# Patient Record
Sex: MALE | Race: BLACK OR AFRICAN AMERICAN | NOT HISPANIC OR LATINO | Employment: OTHER | URBAN - METROPOLITAN AREA
[De-identification: names, ages, dates, MRNs, and addresses within clinical notes are randomized per-mention and may not be internally consistent; named-entity substitution may affect disease eponyms.]

---

## 2024-11-25 ENCOUNTER — HOSPITAL ENCOUNTER (INPATIENT)
Facility: HOSPITAL | Age: 74
End: 2024-11-25
Attending: EMERGENCY MEDICINE | Admitting: INTERNAL MEDICINE
Payer: MEDICARE

## 2024-11-25 ENCOUNTER — CLINICAL SUPPORT (OUTPATIENT)
Dept: EMERGENCY MEDICINE | Facility: HOSPITAL | Age: 74
End: 2024-11-25
Payer: MEDICARE

## 2024-11-25 ENCOUNTER — APPOINTMENT (OUTPATIENT)
Dept: RADIOLOGY | Facility: HOSPITAL | Age: 74
End: 2024-11-25
Payer: MEDICARE

## 2024-11-25 DIAGNOSIS — M62.838 MUSCLE SPASM: ICD-10-CM

## 2024-11-25 DIAGNOSIS — L03.115 CELLULITIS OF RIGHT LOWER EXTREMITY: ICD-10-CM

## 2024-11-25 DIAGNOSIS — Z86.73 HISTORY OF CVA (CEREBROVASCULAR ACCIDENT): ICD-10-CM

## 2024-11-25 DIAGNOSIS — M86.9 OSTEOMYELITIS OF RIGHT FOOT, UNSPECIFIED TYPE (MULTI): Primary | ICD-10-CM

## 2024-11-25 DIAGNOSIS — I73.9 PERIPHERAL VASCULAR DISEASE, UNSPECIFIED (CMS-HCC): ICD-10-CM

## 2024-11-25 DIAGNOSIS — E11.69 TYPE 2 DIABETES MELLITUS WITH OTHER SPECIFIED COMPLICATION, UNSPECIFIED WHETHER LONG TERM INSULIN USE (MULTI): ICD-10-CM

## 2024-11-25 DIAGNOSIS — F41.9 ANXIETY: ICD-10-CM

## 2024-11-25 DIAGNOSIS — Z01.818 ENCOUNTER FOR OTHER PREPROCEDURAL EXAMINATION: ICD-10-CM

## 2024-11-25 DIAGNOSIS — R07.0 THROAT PAIN: ICD-10-CM

## 2024-11-25 LAB
ALBUMIN SERPL BCP-MCNC: 3.2 G/DL (ref 3.4–5)
ALP SERPL-CCNC: 74 U/L (ref 33–136)
ALT SERPL W P-5'-P-CCNC: 59 U/L (ref 10–52)
ANION GAP SERPL CALC-SCNC: 16 MMOL/L (ref 10–20)
APPEARANCE UR: CLEAR
APTT PPP: 33 SECONDS (ref 27–38)
AST SERPL W P-5'-P-CCNC: 37 U/L (ref 9–39)
ATRIAL RATE: 293 BPM
BASOPHILS # BLD AUTO: 0.03 X10*3/UL (ref 0–0.1)
BASOPHILS NFR BLD AUTO: 0.2 %
BILIRUB DIRECT SERPL-MCNC: 0.5 MG/DL (ref 0–0.3)
BILIRUB SERPL-MCNC: 1.3 MG/DL (ref 0–1.2)
BILIRUB UR STRIP.AUTO-MCNC: NEGATIVE MG/DL
BUN SERPL-MCNC: 17 MG/DL (ref 6–23)
CALCIUM SERPL-MCNC: 8.9 MG/DL (ref 8.6–10.6)
CHLORIDE SERPL-SCNC: 102 MMOL/L (ref 98–107)
CO2 SERPL-SCNC: 21 MMOL/L (ref 21–32)
COLOR UR: YELLOW
CREAT SERPL-MCNC: 0.8 MG/DL (ref 0.5–1.3)
CRP SERPL-MCNC: 16.25 MG/DL
EGFRCR SERPLBLD CKD-EPI 2021: >90 ML/MIN/1.73M*2
EOSINOPHIL # BLD AUTO: 0.06 X10*3/UL (ref 0–0.4)
EOSINOPHIL NFR BLD AUTO: 0.5 %
ERYTHROCYTE [DISTWIDTH] IN BLOOD BY AUTOMATED COUNT: 15.8 % (ref 11.5–14.5)
ERYTHROCYTE [SEDIMENTATION RATE] IN BLOOD BY WESTERGREN METHOD: 96 MM/H (ref 0–20)
FERRITIN SERPL-MCNC: 1087 NG/ML (ref 20–300)
GLUCOSE BLD MANUAL STRIP-MCNC: 224 MG/DL (ref 74–99)
GLUCOSE SERPL-MCNC: 210 MG/DL (ref 74–99)
GLUCOSE UR STRIP.AUTO-MCNC: NORMAL MG/DL
HCT VFR BLD AUTO: 27 % (ref 41–52)
HGB BLD-MCNC: 9 G/DL (ref 13.5–17.5)
HGB RETIC QN: 29 PG (ref 28–38)
HOLD SPECIMEN: NORMAL
HOLD SPECIMEN: NORMAL
IMM GRANULOCYTES # BLD AUTO: 0.08 X10*3/UL (ref 0–0.5)
IMM GRANULOCYTES NFR BLD AUTO: 0.6 % (ref 0–0.9)
IMMATURE RETIC FRACTION: 24.3 %
INR PPP: 2.1 (ref 0.9–1.1)
IRON SATN MFR SERPL: 13 % (ref 25–45)
IRON SERPL-MCNC: 25 UG/DL (ref 35–150)
KETONES UR STRIP.AUTO-MCNC: NEGATIVE MG/DL
LACTATE SERPL-SCNC: 1.2 MMOL/L (ref 0.4–2)
LACTATE SERPL-SCNC: 2.1 MMOL/L (ref 0.4–2)
LEUKOCYTE ESTERASE UR QL STRIP.AUTO: NEGATIVE
LYMPHOCYTES # BLD AUTO: 1.57 X10*3/UL (ref 0.8–3)
LYMPHOCYTES NFR BLD AUTO: 12.7 %
MCH RBC QN AUTO: 27.4 PG (ref 26–34)
MCHC RBC AUTO-ENTMCNC: 33.3 G/DL (ref 32–36)
MCV RBC AUTO: 82 FL (ref 80–100)
MONOCYTES # BLD AUTO: 1 X10*3/UL (ref 0.05–0.8)
MONOCYTES NFR BLD AUTO: 8.1 %
NEUTROPHILS # BLD AUTO: 9.65 X10*3/UL (ref 1.6–5.5)
NEUTROPHILS NFR BLD AUTO: 77.9 %
NITRITE UR QL STRIP.AUTO: NEGATIVE
NRBC BLD-RTO: 0 /100 WBCS (ref 0–0)
P AXIS: 84 DEGREES
P OFFSET: 169 MS
P ONSET: 130 MS
PH UR STRIP.AUTO: 7.5 [PH]
PHOSPHATE SERPL-MCNC: 2.5 MG/DL (ref 2.5–4.9)
PLATELET # BLD AUTO: 377 X10*3/UL (ref 150–450)
POTASSIUM SERPL-SCNC: 4.2 MMOL/L (ref 3.5–5.3)
PROT SERPL-MCNC: 7.5 G/DL (ref 6.4–8.2)
PROT UR STRIP.AUTO-MCNC: ABNORMAL MG/DL
PROTHROMBIN TIME: 24.2 SECONDS (ref 9.8–12.8)
Q ONSET: 219 MS
QRS COUNT: 14 BEATS
QRS DURATION: 78 MS
QT INTERVAL: 368 MS
QTC CALCULATION(BAZETT): 445 MS
QTC FREDERICIA: 418 MS
R AXIS: 89 DEGREES
RBC # BLD AUTO: 3.28 X10*6/UL (ref 4.5–5.9)
RBC # UR STRIP.AUTO: NEGATIVE /UL
RBC #/AREA URNS AUTO: ABNORMAL /HPF
RETICS #: 0.09 X10*6/UL (ref 0.02–0.11)
RETICS/RBC NFR AUTO: 2.7 % (ref 0.5–2)
SODIUM SERPL-SCNC: 135 MMOL/L (ref 136–145)
SP GR UR STRIP.AUTO: 1.02
T AXIS: 35 DEGREES
T OFFSET: 403 MS
TIBC SERPL-MCNC: 190 UG/DL (ref 240–445)
UIBC SERPL-MCNC: 165 UG/DL (ref 110–370)
UROBILINOGEN UR STRIP.AUTO-MCNC: ABNORMAL MG/DL
VENTRICULAR RATE: 88 BPM
WBC # BLD AUTO: 12.4 X10*3/UL (ref 4.4–11.3)
WBC #/AREA URNS AUTO: ABNORMAL /HPF

## 2024-11-25 PROCEDURE — 93010 ELECTROCARDIOGRAM REPORT: CPT | Performed by: EMERGENCY MEDICINE

## 2024-11-25 PROCEDURE — 81001 URINALYSIS AUTO W/SCOPE: CPT

## 2024-11-25 PROCEDURE — 2500000004 HC RX 250 GENERAL PHARMACY W/ HCPCS (ALT 636 FOR OP/ED)

## 2024-11-25 PROCEDURE — 82248 BILIRUBIN DIRECT: CPT

## 2024-11-25 PROCEDURE — 2500000001 HC RX 250 WO HCPCS SELF ADMINISTERED DRUGS (ALT 637 FOR MEDICARE OP)

## 2024-11-25 PROCEDURE — 2550000001 HC RX 255 CONTRASTS: Performed by: EMERGENCY MEDICINE

## 2024-11-25 PROCEDURE — 85045 AUTOMATED RETICULOCYTE COUNT: CPT

## 2024-11-25 PROCEDURE — 85730 THROMBOPLASTIN TIME PARTIAL: CPT

## 2024-11-25 PROCEDURE — 96367 TX/PROPH/DG ADDL SEQ IV INF: CPT

## 2024-11-25 PROCEDURE — 99291 CRITICAL CARE FIRST HOUR: CPT | Performed by: EMERGENCY MEDICINE

## 2024-11-25 PROCEDURE — 99285 EMERGENCY DEPT VISIT HI MDM: CPT | Mod: 25

## 2024-11-25 PROCEDURE — 83036 HEMOGLOBIN GLYCOSYLATED A1C: CPT

## 2024-11-25 PROCEDURE — 82947 ASSAY GLUCOSE BLOOD QUANT: CPT

## 2024-11-25 PROCEDURE — 73630 X-RAY EXAM OF FOOT: CPT | Mod: RIGHT SIDE | Performed by: RADIOLOGY

## 2024-11-25 PROCEDURE — 82728 ASSAY OF FERRITIN: CPT

## 2024-11-25 PROCEDURE — 73720 MRI LWR EXTREMITY W/O&W/DYE: CPT | Mod: RT

## 2024-11-25 PROCEDURE — 2500000002 HC RX 250 W HCPCS SELF ADMINISTERED DRUGS (ALT 637 FOR MEDICARE OP, ALT 636 FOR OP/ED)

## 2024-11-25 PROCEDURE — 93005 ELECTROCARDIOGRAM TRACING: CPT

## 2024-11-25 PROCEDURE — 99291 CRITICAL CARE FIRST HOUR: CPT | Mod: 25 | Performed by: EMERGENCY MEDICINE

## 2024-11-25 PROCEDURE — 83550 IRON BINDING TEST: CPT

## 2024-11-25 PROCEDURE — 80053 COMPREHEN METABOLIC PANEL: CPT

## 2024-11-25 PROCEDURE — A9575 INJ GADOTERATE MEGLUMI 0.1ML: HCPCS | Performed by: EMERGENCY MEDICINE

## 2024-11-25 PROCEDURE — 83605 ASSAY OF LACTIC ACID: CPT

## 2024-11-25 PROCEDURE — 73630 X-RAY EXAM OF FOOT: CPT | Mod: LT

## 2024-11-25 PROCEDURE — 84100 ASSAY OF PHOSPHORUS: CPT

## 2024-11-25 PROCEDURE — 96366 THER/PROPH/DIAG IV INF ADDON: CPT

## 2024-11-25 PROCEDURE — 73720 MRI LWR EXTREMITY W/O&W/DYE: CPT | Mod: RIGHT SIDE | Performed by: RADIOLOGY

## 2024-11-25 PROCEDURE — 96365 THER/PROPH/DIAG IV INF INIT: CPT

## 2024-11-25 PROCEDURE — 99223 1ST HOSP IP/OBS HIGH 75: CPT

## 2024-11-25 PROCEDURE — 85610 PROTHROMBIN TIME: CPT

## 2024-11-25 PROCEDURE — 73630 X-RAY EXAM OF FOOT: CPT | Mod: RT

## 2024-11-25 PROCEDURE — 87040 BLOOD CULTURE FOR BACTERIA: CPT

## 2024-11-25 PROCEDURE — 36415 COLL VENOUS BLD VENIPUNCTURE: CPT

## 2024-11-25 PROCEDURE — 85652 RBC SED RATE AUTOMATED: CPT

## 2024-11-25 PROCEDURE — 1210000001 HC SEMI-PRIVATE ROOM DAILY

## 2024-11-25 PROCEDURE — 86140 C-REACTIVE PROTEIN: CPT

## 2024-11-25 PROCEDURE — 85025 COMPLETE CBC W/AUTO DIFF WBC: CPT

## 2024-11-25 RX ORDER — POLYETHYLENE GLYCOL 3350 17 G/17G
17 POWDER, FOR SOLUTION ORAL DAILY PRN
Status: DISCONTINUED | OUTPATIENT
Start: 2024-11-25 | End: 2024-11-26

## 2024-11-25 RX ORDER — GABAPENTIN 300 MG/1
300 CAPSULE ORAL 2 TIMES DAILY
Status: DISCONTINUED | OUTPATIENT
Start: 2024-11-25 | End: 2024-11-25

## 2024-11-25 RX ORDER — VANCOMYCIN HYDROCHLORIDE 1 G/200ML
1000 INJECTION, SOLUTION INTRAVENOUS EVERY 12 HOURS
Status: DISCONTINUED | OUTPATIENT
Start: 2024-11-26 | End: 2024-11-29

## 2024-11-25 RX ORDER — GABAPENTIN 300 MG/1
300 CAPSULE ORAL 3 TIMES DAILY
Status: DISCONTINUED | OUTPATIENT
Start: 2024-11-25 | End: 2024-12-07 | Stop reason: HOSPADM

## 2024-11-25 RX ORDER — ACETAMINOPHEN 325 MG/1
975 TABLET ORAL EVERY 8 HOURS PRN
Status: DISCONTINUED | OUTPATIENT
Start: 2024-11-25 | End: 2024-11-26

## 2024-11-25 RX ORDER — CYANOCOBALAMIN (VITAMIN B-12) 500 MCG
400 TABLET ORAL DAILY
Status: DISCONTINUED | OUTPATIENT
Start: 2024-11-26 | End: 2024-12-07 | Stop reason: HOSPADM

## 2024-11-25 RX ORDER — ALUMINUM HYDROXIDE, MAGNESIUM HYDROXIDE, AND SIMETHICONE 1200; 120; 1200 MG/30ML; MG/30ML; MG/30ML
30 SUSPENSION ORAL EVERY 6 HOURS PRN
Status: DISCONTINUED | OUTPATIENT
Start: 2024-11-25 | End: 2024-12-07 | Stop reason: HOSPADM

## 2024-11-25 RX ORDER — VANCOMYCIN HYDROCHLORIDE 1 G/20ML
INJECTION, POWDER, LYOPHILIZED, FOR SOLUTION INTRAVENOUS DAILY PRN
Status: DISCONTINUED | OUTPATIENT
Start: 2024-11-25 | End: 2024-11-30

## 2024-11-25 RX ORDER — ACETAMINOPHEN 325 MG/1
650 TABLET ORAL ONCE
Status: COMPLETED | OUTPATIENT
Start: 2024-11-25 | End: 2024-11-25

## 2024-11-25 RX ORDER — TAMSULOSIN HYDROCHLORIDE 0.4 MG/1
0.4 CAPSULE ORAL NIGHTLY
Status: DISCONTINUED | OUTPATIENT
Start: 2024-11-25 | End: 2024-12-07 | Stop reason: HOSPADM

## 2024-11-25 RX ORDER — OXYCODONE HYDROCHLORIDE 5 MG/1
5 TABLET ORAL ONCE
Status: COMPLETED | OUTPATIENT
Start: 2024-11-25 | End: 2024-11-25

## 2024-11-25 RX ORDER — B-COMPLEX WITH VITAMIN C
1 TABLET ORAL DAILY
Status: DISCONTINUED | OUTPATIENT
Start: 2024-11-26 | End: 2024-12-07 | Stop reason: HOSPADM

## 2024-11-25 RX ORDER — GADOTERATE MEGLUMINE 376.9 MG/ML
18 INJECTION INTRAVENOUS
Status: COMPLETED | OUTPATIENT
Start: 2024-11-25 | End: 2024-11-25

## 2024-11-25 RX ORDER — VANCOMYCIN HYDROCHLORIDE 1 G/200ML
2 INJECTION, SOLUTION INTRAVENOUS ONCE
Status: COMPLETED | OUTPATIENT
Start: 2024-11-25 | End: 2024-11-25

## 2024-11-25 ASSESSMENT — ENCOUNTER SYMPTOMS
RESPIRATORY NEGATIVE: 1
ALLERGIC/IMMUNOLOGIC NEGATIVE: 1
NECK PAIN: 1
EYES NEGATIVE: 1
GASTROINTESTINAL NEGATIVE: 1
FATIGUE: 1
CHILLS: 1
CARDIOVASCULAR NEGATIVE: 1
WOUND: 1
NEUROLOGICAL NEGATIVE: 1
HEMATOLOGIC/LYMPHATIC NEGATIVE: 1
PSYCHIATRIC NEGATIVE: 1
RHINORRHEA: 1

## 2024-11-25 ASSESSMENT — COLUMBIA-SUICIDE SEVERITY RATING SCALE - C-SSRS
2. HAVE YOU ACTUALLY HAD ANY THOUGHTS OF KILLING YOURSELF?: NO
1. IN THE PAST MONTH, HAVE YOU WISHED YOU WERE DEAD OR WISHED YOU COULD GO TO SLEEP AND NOT WAKE UP?: NO
6. HAVE YOU EVER DONE ANYTHING, STARTED TO DO ANYTHING, OR PREPARED TO DO ANYTHING TO END YOUR LIFE?: NO

## 2024-11-25 ASSESSMENT — PAIN - FUNCTIONAL ASSESSMENT
PAIN_FUNCTIONAL_ASSESSMENT: 0-10
PAIN_FUNCTIONAL_ASSESSMENT: 0-10

## 2024-11-25 ASSESSMENT — PAIN SCALES - GENERAL
PAINLEVEL_OUTOF10: 8
PAINLEVEL_OUTOF10: 2

## 2024-11-25 ASSESSMENT — PAIN DESCRIPTION - DESCRIPTORS: DESCRIPTORS: SHOOTING;THROBBING

## 2024-11-25 ASSESSMENT — ACTIVITIES OF DAILY LIVING (ADL): LACK_OF_TRANSPORTATION: NO

## 2024-11-25 ASSESSMENT — PAIN DESCRIPTION - PAIN TYPE: TYPE: ACUTE PAIN

## 2024-11-25 ASSESSMENT — PAIN DESCRIPTION - LOCATION: LOCATION: FOOT

## 2024-11-25 ASSESSMENT — PAIN DESCRIPTION - PROGRESSION: CLINICAL_PROGRESSION: GRADUALLY IMPROVING

## 2024-11-25 ASSESSMENT — PAIN DESCRIPTION - ORIENTATION: ORIENTATION: RIGHT

## 2024-11-25 NOTE — SIGNIFICANT EVENT
We went to evaluate the patient with attending Dr. Rodriguez; however, the patient was not available in the room as he had gone for an MRI.  We reviewed the available vitals, labs, imaging, and notes.  The plan is to fully evaluate the patient tomorrow morning.  Of note, we spoke with Dr. Posadas's CNP at the [facility], who recommended a BKA for the patient.

## 2024-11-25 NOTE — ED PROVIDER NOTES
History of Present Illness     History provided by: Patient  Limitations to History: None  External Records Reviewed with Brief Summary: Discharge Summary from 7/22/24 which showed patient was admitted for a repeat incision and drainage of his right foot due to worsening of his chronic osteomyelitis. He was initially supposed to be nonweightbearing, but was unable to comply. He had additionally had a CT that noted soft tissue extending to the bone concern for acute osteomyelitis. Infectious disease was consulted and patient was given a PICC line in order to receive six weeks of IV vancomycin. He was also taken to the OR on 7/18/24 four left foot second digit proximal interphalangeal joint arthroplasty with debridement.    HPI:  Dustin Ace is a 74 y.o. male presenting with right foot wound and concern for infection. Patient states that he has had a chronic ulcer on the bottom of his right foot. He states that at one point the wound had seem to be healing, but then continued to get worse and he noted pus draining from the wound prompting him to come to the ED today. He has been doing wound care at home but states he has had difficulty maintaining the dressings and is not sure if he is doing it correctly. He ambulates at home with a walker and a walking boot. He also notes that he is felt feverish, chills for about a week. When discussing with patient his previous care at an outside hospital, he notes that he was concerned that he has not been able to adequately care for his wounds at home. He came here today for further assessment as he is concerned that the infection is getting worse. He states that he needs more assistance with wound care than he had previously gotten at outside hospital.    PMH: emphysema, BPH, CVA hx, PAD, Afib (on Eliquis), HTN, DM, chronic osteomyelitis    Physical Exam   Triage vitals:  T (!) 38 °C (100.4 °F)  HR (!) 101  /71  RR 18  O2 98 % None (Room air)    General: Awake, alert,  in no acute distress  Eyes: Gaze conjugate. EOMI. No scleral icterus or injection.   HENT: Normo-cephalic, atraumatic. No stridor.   CV: Regular rate, regular rhythm. L DP pulse 2+, R DP pulse dopplerable.   Resp: Breathing non-labored, speaking in full sentences.  Clear to auscultation bilaterally. No wheezing, rales, rhonchi.   GI: Soft, non-distended, non-tender. No rebound or guarding.   MSK/Extremities:   R foot:   R Lateral ankle:   L foot:   Skin: Warm. Appropriate color.   Neuro: Alert and oriented x3. Face symmetric. Speech is fluent.  Gross strength and sensation intact in b/l UE and Les.  Psych: Appropriate mood and affect      Medical Decision Making & ED Course   Medical Decision Makin y.o. male on physical exam, patient is noted to be febrile and tachycardic. His left foot has a ulcer on the dorsal aspect of his foot and amputation of the left first toe was noted. This wound appears dry, no purulent drainage. The right foot has a ulcer extending across the entire width of the endorsing of the foot. The ulcer extends deep to the foot with visible tissue, purulent drainage and surrounding skin surrounding the ulcer has green color. Concern for acute on chronic osteomyelitis and cellulitis possibly leading to sepsis. Patient is SIRS positive. Will start on fluids and empiric antibiotics. Will obtain labs and consult podiatry for further recommendations. Patient is currently denying need for pain medication. Also obtain x-rays to assess initial degree of known chronic osteomyelitis.  Discussed imaging with podiatry, and they recommended obtaining an MRI and will see the patient.      ED Course:  ED Course as of 24 2332   Mon 2024   1323 XR foot right 3+ views  IMPRESSION:  Findings highly concerning for osteomyelitis involving the right  midfoot at its plantar aspect. Associated severe neuropathic arthropathy present.  [AC]   1323 XR foot left 3+ views  IMPRESSION:  Postsurgical changes  in the left without  evidence of osteomyelitis radiographically   [AC]   1347 Lactate(!): 2.1 [AC]   1352 CBC and Auto Differential(!)  Patient's most recent hemoglobin in 8/2024 was 13.1. Today patients hemoglobin is 9.   WBC is 12.4, concerning for infectious vs inflammatory response. [AC]      ED Course User Index  [AC] Reid Luke DO         Diagnoses as of 11/26/24 2332   Osteomyelitis of right foot, unspecified type (Multi)   Cellulitis of right lower extremity       EKG Independent Interpretation:  EKG obtained at 11:36 independently interpreted by me. It demonstrates rate of 88. Irregularly irregular rhythm. No axis deviation. QRS 78, . No ST elevations noted. T-wave inversion in AVR. No previous EKG to compare. Patient does have a history of atrial fibrillation, anticoagulated on Eliquis.    Chronic conditions affecting the patient's care: As documented above in MDM      Care Considerations: As documented above in MDM      Disposition   Patient was signed out to Dr. Robledo at 15:00 pending completion of their work-up.  Please see the next provider's transition of care note for the remainder of the patient's care.     Procedures   Procedures        Reid Luke DO  Emergency Medicine PGY1     Reid Luke DO  Resident  11/26/24 8052       Reid Luke DO  Resident  11/27/24 [id number]

## 2024-11-25 NOTE — PROGRESS NOTES
Emergency Department Transition of Care Note       Signout   I received Dustin Ace in signout from Dr. Marly DO.  Please see the ED Provider Note for all HPI, PE and MDM up to the time of signout at 1500.  This is in addition to the primary record.    In brief Dustin Ace is an 74 y.o. male presenting for assessment of chronic wounds to his right foot which he feels hs is unable to care for on his own at home. Patient has been seen at [facility] regarding his chronic osteomyelitis for virtually all of his care in the past, but became frustrated with the lack of provided wound care as of recently. Patient evaluated and was noted to meet SIRS criteria with a leukocytosis, X-rays concerning for possible osteomyelitis. Patient received Vanc/Zosyn and MRI ordered for further evaluation. Podiatry was consulted and will come down to evaluate the patient while in the ED.     At the time of signout we were awaiting:  MRI right foot   Final disposition     ED Course & Medical Decision Making   Medical Decision Making:  Under my care, I evaluted the pateient at bedside.  Patient denied any need for pain control at this time.    Patient went to MRI and tolerated this without difficulty.  MRI was read as concern for stimulators of the right lower extremity as well as cellulitis.  I did speak with the admission coordinator who accepted the patient for admission.  Patient updated on the plan for admission and was agreeable.    ED Course:  ED Course as of 11/25/24 1954 Mon Nov 25, 2024   1323 XR foot right 3+ views  IMPRESSION:  Findings highly concerning for osteomyelitis involving the right  midfoot at its plantar aspect. Associated severe neuropathic arthropathy present.  [AC]   1323 XR foot left 3+ views  IMPRESSION:  Postsurgical changes in the left without  evidence of osteomyelitis radiographically   [AC]   1347 Lactate(!): 2.1 [AC]   1352 CBC and Auto Differential(!)  Patient's most recent hemoglobin in  8/2024 was 13.1. Today patients hemoglobin is 9.   WBC is 12.4, concerning for infectious vs inflammatory response. [AC]      ED Course User Index  [AC] Reid Luke DO         Diagnoses as of 11/25/24 1954   Osteomyelitis of right foot, unspecified type (Multi)   Cellulitis of right lower extremity       Disposition   As a result of their workup, the patient will require admission to the hospital.  The patient was informed of his diagnosis.  The patient was given the opportunity to ask questions and I answered them. The patient agreed to be admitted to the hospital.    Procedures   Procedures    Patient seen and discussed with ED attending physician.    Mitali Bojorquez DO  Emergency Medicine

## 2024-11-25 NOTE — ED TRIAGE NOTES
Right foot pain shooting 8/10. Patient has diabetic wound on bottom of right foot. Reports 2 weeks ago wound has reopened after being healed. Wound encompassing bottom of the foot. Patient reports pus draining from wound with redness. Patient having difficulty and pain with ambulating back to triage. Patient has neuropathy to bilateral feet. Patient complaining of feeling very weak and fatigued. Patient is A&Ox4.

## 2024-11-25 NOTE — PROGRESS NOTES
Dustin Ace is a 74 y.o. male on day 0 of admission.       11/25/24 3788   Discharge Planning   Living Arrangements Alone   Support Systems Children   Assistance Needed walker, cane, boot   Type of Residence Private residence   Number of Stairs to Enter Residence   (3rd floor, uses elevator)   Number of Stairs Within Residence 0   Do you have animals or pets at home? No   Does the patient need discharge transport arranged? Yes   RoundTrip coordination needed? Yes   Has discharge transport been arranged? No   Financial Resource Strain   How hard is it for you to pay for the very basics like food, housing, medical care, and heating? Not very   Housing Stability   In the last 12 months, was there a time when you were not able to pay the mortgage or rent on time? N   At any time in the past 12 months, were you homeless or living in a shelter (including now)? N   Transportation Needs   In the past 12 months, has lack of transportation kept you from medical appointments or from getting medications? yes   In the past 12 months, has lack of transportation kept you from meetings, work, or from getting things needed for daily living? No   Stroke Family Assessment   Stroke Family Assessment Needed No   Intensity of Service   Intensity of Service 0-30 min     TCC met with patient. Patient states he lives alone. His children help him but they can't always help due to work. Patient uses a walker and cane, there is a boot at the bedside, has a shower chair at home. Patient states he has a home health aide through [facility], but they don't always show up. He states he also needs an aide with a car as he sometimes needs his medications picked up. He states when the aide cannot get them, he is usually able to call someone else to pick them up. The aide usually helps him with ADLs and tidying the home. He states he can do this by himself too. Patient states he previously had home health services with [facility]. PT/OT and RN  visits. The nurse was handling his wounds. He states the wounds got better and he was told by his care team they were healed enough to manage on his own. He was told to use an antibiotic soap on them. Patient had trouble caring for the wounds as he cannot see them and he doesn't understand fully what he is supposed to do, so the ulcers got worse without proper care.   Patient states he is open to having home services again.     TIFFANIE/NOMI to follow for discharge planning pending MD workup and dispo.

## 2024-11-26 ENCOUNTER — APPOINTMENT (OUTPATIENT)
Dept: VASCULAR MEDICINE | Facility: HOSPITAL | Age: 74
End: 2024-11-26
Payer: MEDICARE

## 2024-11-26 LAB
ALBUMIN SERPL BCP-MCNC: 2.7 G/DL (ref 3.4–5)
ANION GAP SERPL CALC-SCNC: 12 MMOL/L (ref 10–20)
APTT PPP: 33 SECONDS (ref 27–38)
BASOPHILS # BLD AUTO: 0.05 X10*3/UL (ref 0–0.1)
BASOPHILS NFR BLD AUTO: 0.6 %
BUN SERPL-MCNC: 10 MG/DL (ref 6–23)
CALCIUM SERPL-MCNC: 7.8 MG/DL (ref 8.6–10.6)
CHLORIDE SERPL-SCNC: 102 MMOL/L (ref 98–107)
CHOLEST SERPL-MCNC: 62 MG/DL (ref 0–199)
CHOLESTEROL/HDL RATIO: 4.5
CO2 SERPL-SCNC: 24 MMOL/L (ref 21–32)
CREAT SERPL-MCNC: 0.67 MG/DL (ref 0.5–1.3)
EGFRCR SERPLBLD CKD-EPI 2021: >90 ML/MIN/1.73M*2
EOSINOPHIL # BLD AUTO: 0.17 X10*3/UL (ref 0–0.4)
EOSINOPHIL NFR BLD AUTO: 2 %
ERYTHROCYTE [DISTWIDTH] IN BLOOD BY AUTOMATED COUNT: 15.4 % (ref 11.5–14.5)
EST. AVERAGE GLUCOSE BLD GHB EST-MCNC: 114 MG/DL
GLUCOSE BLD MANUAL STRIP-MCNC: 89 MG/DL (ref 74–99)
GLUCOSE BLD MANUAL STRIP-MCNC: 95 MG/DL (ref 74–99)
GLUCOSE SERPL-MCNC: 106 MG/DL (ref 74–99)
HBA1C MFR BLD: 5.6 %
HCT VFR BLD AUTO: 23.3 % (ref 41–52)
HDLC SERPL-MCNC: 13.9 MG/DL
HGB BLD-MCNC: 8 G/DL (ref 13.5–17.5)
HOLD SPECIMEN: NORMAL
IMM GRANULOCYTES # BLD AUTO: 0.12 X10*3/UL (ref 0–0.5)
IMM GRANULOCYTES NFR BLD AUTO: 1.4 % (ref 0–0.9)
LDLC SERPL CALC-MCNC: 34 MG/DL
LYMPHOCYTES # BLD AUTO: 2.22 X10*3/UL (ref 0.8–3)
LYMPHOCYTES NFR BLD AUTO: 25.8 %
MAGNESIUM SERPL-MCNC: 1.92 MG/DL (ref 1.6–2.4)
MCH RBC QN AUTO: 27.6 PG (ref 26–34)
MCHC RBC AUTO-ENTMCNC: 34.3 G/DL (ref 32–36)
MCV RBC AUTO: 80 FL (ref 80–100)
MONOCYTES # BLD AUTO: 0.79 X10*3/UL (ref 0.05–0.8)
MONOCYTES NFR BLD AUTO: 9.2 %
NEUTROPHILS # BLD AUTO: 5.24 X10*3/UL (ref 1.6–5.5)
NEUTROPHILS NFR BLD AUTO: 61 %
NON HDL CHOLESTEROL: 48 MG/DL (ref 0–149)
NRBC BLD-RTO: 0 /100 WBCS (ref 0–0)
PHOSPHATE SERPL-MCNC: 3.2 MG/DL (ref 2.5–4.9)
PLATELET # BLD AUTO: 341 X10*3/UL (ref 150–450)
POTASSIUM SERPL-SCNC: 3.8 MMOL/L (ref 3.5–5.3)
RBC # BLD AUTO: 2.9 X10*6/UL (ref 4.5–5.9)
SODIUM SERPL-SCNC: 134 MMOL/L (ref 136–145)
TRIGL SERPL-MCNC: 69 MG/DL (ref 0–149)
VLDL: 14 MG/DL (ref 0–40)
WBC # BLD AUTO: 8.6 X10*3/UL (ref 4.4–11.3)

## 2024-11-26 PROCEDURE — 93925 LOWER EXTREMITY STUDY: CPT

## 2024-11-26 PROCEDURE — 99233 SBSQ HOSP IP/OBS HIGH 50: CPT

## 2024-11-26 PROCEDURE — 2500000002 HC RX 250 W HCPCS SELF ADMINISTERED DRUGS (ALT 637 FOR MEDICARE OP, ALT 636 FOR OP/ED)

## 2024-11-26 PROCEDURE — 99221 1ST HOSP IP/OBS SF/LOW 40: CPT | Performed by: STUDENT IN AN ORGANIZED HEALTH CARE EDUCATION/TRAINING PROGRAM

## 2024-11-26 PROCEDURE — 2500000004 HC RX 250 GENERAL PHARMACY W/ HCPCS (ALT 636 FOR OP/ED)

## 2024-11-26 PROCEDURE — 93922 UPR/L XTREMITY ART 2 LEVELS: CPT | Performed by: SURGERY

## 2024-11-26 PROCEDURE — 82947 ASSAY GLUCOSE BLOOD QUANT: CPT

## 2024-11-26 PROCEDURE — 2500000001 HC RX 250 WO HCPCS SELF ADMINISTERED DRUGS (ALT 637 FOR MEDICARE OP)

## 2024-11-26 PROCEDURE — 80069 RENAL FUNCTION PANEL: CPT

## 2024-11-26 PROCEDURE — 93922 UPR/L XTREMITY ART 2 LEVELS: CPT

## 2024-11-26 PROCEDURE — 1210000001 HC SEMI-PRIVATE ROOM DAILY

## 2024-11-26 PROCEDURE — 85025 COMPLETE CBC W/AUTO DIFF WBC: CPT

## 2024-11-26 PROCEDURE — 93925 LOWER EXTREMITY STUDY: CPT | Performed by: SURGERY

## 2024-11-26 PROCEDURE — 80061 LIPID PANEL: CPT

## 2024-11-26 PROCEDURE — 36415 COLL VENOUS BLD VENIPUNCTURE: CPT

## 2024-11-26 PROCEDURE — 85730 THROMBOPLASTIN TIME PARTIAL: CPT

## 2024-11-26 PROCEDURE — 83735 ASSAY OF MAGNESIUM: CPT

## 2024-11-26 PROCEDURE — 87077 CULTURE AEROBIC IDENTIFY: CPT

## 2024-11-26 RX ORDER — ACETAMINOPHEN 325 MG/1
975 TABLET ORAL EVERY 8 HOURS
Status: DISCONTINUED | OUTPATIENT
Start: 2024-11-26 | End: 2024-12-07 | Stop reason: HOSPADM

## 2024-11-26 RX ORDER — POLYETHYLENE GLYCOL 3350 17 G/17G
17 POWDER, FOR SOLUTION ORAL DAILY
Status: DISCONTINUED | OUTPATIENT
Start: 2024-11-26 | End: 2024-12-07 | Stop reason: HOSPADM

## 2024-11-26 RX ORDER — OXYCODONE HYDROCHLORIDE 5 MG/1
5 TABLET ORAL EVERY 4 HOURS PRN
Status: DISCONTINUED | OUTPATIENT
Start: 2024-11-26 | End: 2024-12-07 | Stop reason: HOSPADM

## 2024-11-26 RX ORDER — ATORVASTATIN CALCIUM 80 MG/1
80 TABLET, FILM COATED ORAL NIGHTLY
Status: DISCONTINUED | OUTPATIENT
Start: 2024-11-26 | End: 2024-12-07 | Stop reason: HOSPADM

## 2024-11-26 RX ORDER — MAGNESIUM SULFATE HEPTAHYDRATE 40 MG/ML
2 INJECTION, SOLUTION INTRAVENOUS ONCE
Status: COMPLETED | OUTPATIENT
Start: 2024-11-26 | End: 2024-11-26

## 2024-11-26 RX ORDER — HEPARIN SODIUM 10000 [USP'U]/100ML
0-4000 INJECTION, SOLUTION INTRAVENOUS CONTINUOUS
Status: DISPENSED | OUTPATIENT
Start: 2024-11-26 | End: 2024-11-29

## 2024-11-26 RX ORDER — MAGNESIUM SULFATE HEPTAHYDRATE 40 MG/ML
INJECTION, SOLUTION INTRAVENOUS
Status: COMPLETED
Start: 2024-11-26 | End: 2024-11-26

## 2024-11-26 ASSESSMENT — PAIN SCALES - GENERAL
PAINLEVEL_OUTOF10: 0 - NO PAIN
PAINLEVEL_OUTOF10: 0 - NO PAIN

## 2024-11-26 ASSESSMENT — PAIN - FUNCTIONAL ASSESSMENT: PAIN_FUNCTIONAL_ASSESSMENT: 0-10

## 2024-11-26 NOTE — H&P (VIEW-ONLY)
PODIATRY CONSULT NOTE    SERVICE DATE: 11/26/2024   SERVICE TIME:  08:43 AM    REASON FOR CONSULT: Chronic R foot osteo, with worsening of wound   REQUESTING PHYSICIAN: Reid Luke DO   PRIMARY CARE PHYSICIAN: No Assigned PCP Generic Provider, MD    Subjective   HPI:  Mr. Ace is a 74 y.o. male with past medical history of emphysema, BPH, chronic hepatitis C (treated), CVA, PAD, Afib, HTN, DM, Charcot deformity, and chronic right foot osteomyelitis presenting a with right foot wound. The patient reports that his issue began over two years ago, with recurrent hospitals visits for wound management. His left great toe was amputated in July. On 11/11/24, he noticed a sore on the right side of his foot, which was treated, and he was discharged with wound care instruction. He presented to the ED yesterday due to an increase in pus and drainage from the wound, which started approximately one week ago. He reports limited mobility primarily walking short distances within the home, and uses cane for ambulation. Patient also reports fevers and chills over the past week but denies headache, nausea, vomiting, chest pain and SOB.     Podiatry was consulted for chronic R foot osteomyelitis, and worsening of wound.    ROS: 10-point review of systems was performed and is otherwise negative except as noted in HPI.  PMH: Reviewed/documented below.  PSH:  Noncontributory except per HPI   FH: Reviewed and noncontributory   SOCIAL:  Reviewed/documented below.  ALLERGIES: Reviewed/documented below.  MEDS: Reviewed/documented below.  VS: Reviewed/documented below.    No past medical history on file.  No past surgical history on file.  No family history on file.      (Not in a hospital admission)       Medications:  Scheduled Meds: acetaminophen, 975 mg, oral, q8h  atorvastatin, 80 mg, oral, Nightly  B complex-vitamin C, 1 tablet, oral, Daily  cholecalciferol, 400 Units, oral, Daily  gabapentin, 300 mg, oral,  TID  piperacillin-tazobactam, 4.5 g, intravenous, q6h  polyethylene glycol, 17 g, oral, Daily  tamsulosin, 0.4 mg, oral, Nightly  vancomycin, 1,000 mg, intravenous, q12h      Continuous Infusions:    PRN Meds: PRN medications: alum-mag hydroxide-simeth, oxyCODONE, vancomycin    Allergies as of 11/25/2024    (No Known Allergies)            Objective   PHYSICAL EXAM:  Physical Exam Performed:  Vitals:    11/26/24 0800   BP: 100/62   Pulse: 69   Resp: 16   Temp:    SpO2: 96%     Body mass index is 25 kg/m².    Patient is AOx3 and in no acute distress. Patient is alert and cooperative.       Vascular: Non-palpable DP/PT pulses B/L. Mild pitting edema noted B/L. Hair growth absent B/L. CFT<5 to B/L hallux. Temperature is warm to cool from tibial tuberosity to distal digits B/L.     Musculoskeletal: Gross active and passive ROM intact to age and activity level. Moves all extremities spontaneously. Charcot deformity noted to right foot. Left hallux amputation noted.     Neurological: Absent light touch sensation B/L. Pain stimuli absent B/L.     Dermatologic: Nails 1-5 are within normal limits for thickness and length B/L. Skin appears diffusely xerotic B/L. Web spaces 1-4 B/L are clean, dry and intact.     Wound: right plantar foot       Mixed wound base of fibro-granular tissue.   Mild serosanguinous drainage   Minimal jona-wound maceration.   Mild jona-wound erythema.   No evidence of ascending cellulitis or lymphangitis.  No palpable fluctuance. No malodor. No increased warmth.   Significant probe to bone or deep structures within the wound base.   Minimal undermining.     Wound: right lateral leg       Wound base mostly granular  No serosanguinous drainage with fibrotic slough.   No jona-wound maceration.   Minimal jona-wound erythema.   No evidence of ascending cellulitis or lymphangitis.  No palpable fluctuance. No malodor. No increased warmth.    No tunneling or tracking.   No undermining. Skin edges irregular but  intact.    Wound: Left foot      Wound base fibrotic tissue.   No serosanguinous drainage with fibrotic slough.   No jona-wound maceration.   No jona-wound erythema.   No evidence of ascending cellulitis or lymphangitis.  No palpable fluctuance. No malodor. No increased warmth.   No tunneling or tracking.   No undermining.     LABS:   Results for orders placed or performed during the hospital encounter of 11/25/24 (from the past 24 hours)   POCT GLUCOSE   Result Value Ref Range    POCT Glucose 224 (H) 74 - 99 mg/dL   ECG 12 lead   Result Value Ref Range    Ventricular Rate 88 BPM    Atrial Rate 293 BPM    QRS Duration 78 ms    QT Interval 368 ms    QTC Calculation(Bazett) 445 ms    P Axis 84 degrees    R Axis 89 degrees    T Axis 35 degrees    QRS Count 14 beats    Q Onset 219 ms    P Onset 130 ms    P Offset 169 ms    T Offset 403 ms    QTC Fredericia 418 ms   CBC and Auto Differential   Result Value Ref Range    WBC 12.4 (H) 4.4 - 11.3 x10*3/uL    nRBC 0.0 0.0 - 0.0 /100 WBCs    RBC 3.28 (L) 4.50 - 5.90 x10*6/uL    Hemoglobin 9.0 (L) 13.5 - 17.5 g/dL    Hematocrit 27.0 (L) 41.0 - 52.0 %    MCV 82 80 - 100 fL    MCH 27.4 26.0 - 34.0 pg    MCHC 33.3 32.0 - 36.0 g/dL    RDW 15.8 (H) 11.5 - 14.5 %    Platelets 377 150 - 450 x10*3/uL    Neutrophils % 77.9 40.0 - 80.0 %    Immature Granulocytes %, Automated 0.6 0.0 - 0.9 %    Lymphocytes % 12.7 13.0 - 44.0 %    Monocytes % 8.1 2.0 - 10.0 %    Eosinophils % 0.5 0.0 - 6.0 %    Basophils % 0.2 0.0 - 2.0 %    Neutrophils Absolute 9.65 (H) 1.60 - 5.50 x10*3/uL    Immature Granulocytes Absolute, Automated 0.08 0.00 - 0.50 x10*3/uL    Lymphocytes Absolute 1.57 0.80 - 3.00 x10*3/uL    Monocytes Absolute 1.00 (H) 0.05 - 0.80 x10*3/uL    Eosinophils Absolute 0.06 0.00 - 0.40 x10*3/uL    Basophils Absolute 0.03 0.00 - 0.10 x10*3/uL   Comprehensive metabolic panel   Result Value Ref Range    Glucose 210 (H) 74 - 99 mg/dL    Sodium 135 (L) 136 - 145 mmol/L    Potassium 4.2 3.5 - 5.3  mmol/L    Chloride 102 98 - 107 mmol/L    Bicarbonate 21 21 - 32 mmol/L    Anion Gap 16 10 - 20 mmol/L    Urea Nitrogen 17 6 - 23 mg/dL    Creatinine 0.80 0.50 - 1.30 mg/dL    eGFR >90 >60 mL/min/1.73m*2    Calcium 8.9 8.6 - 10.6 mg/dL    Albumin 3.2 (L) 3.4 - 5.0 g/dL    Alkaline Phosphatase 74 33 - 136 U/L    Total Protein 7.5 6.4 - 8.2 g/dL    AST 37 9 - 39 U/L    Bilirubin, Total 1.3 (H) 0.0 - 1.2 mg/dL    ALT 59 (H) 10 - 52 U/L   Lactate   Result Value Ref Range    Lactate 2.1 (H) 0.4 - 2.0 mmol/L   Protime-INR   Result Value Ref Range    Protime 24.2 (H) 9.8 - 12.8 seconds    INR 2.1 (H) 0.9 - 1.1   aPTT   Result Value Ref Range    aPTT 33 27 - 38 seconds   C-reactive protein   Result Value Ref Range    C-Reactive Protein 16.25 (H) <1.00 mg/dL   Bilirubin, Direct   Result Value Ref Range    Bilirubin, Direct 0.5 (H) 0.0 - 0.3 mg/dL   Iron and TIBC   Result Value Ref Range    Iron 25 (L) 35 - 150 ug/dL    UIBC 165 110 - 370 ug/dL    TIBC 190 (L) 240 - 445 ug/dL    % Saturation 13 (L) 25 - 45 %   Ferritin   Result Value Ref Range    Ferritin 1,087 (H) 20 - 300 ng/mL   Phosphorus   Result Value Ref Range    Phosphorus 2.5 2.5 - 4.9 mg/dL   Hemoglobin A1c   Result Value Ref Range    Hemoglobin A1C 5.6 See comment %    Estimated Average Glucose 114 Not Established mg/dL   Blood Culture    Specimen: Peripheral Venipuncture; Blood culture   Result Value Ref Range    Blood Culture Loaded on Instrument - Culture in progress    Lavender Top   Result Value Ref Range    Extra Tube Hold for add-ons.    PST Top   Result Value Ref Range    Extra Tube Hold for add-ons.    Sedimentation rate, automated   Result Value Ref Range    Sedimentation Rate 96 (H) 0 - 20 mm/h   Reticulocytes   Result Value Ref Range    Retic % 2.7 (H) 0.5 - 2.0 %    Retic Absolute 0.086 0.017 - 0.110 x10*6/uL    Reticulocyte Hemoglobin 29 28 - 38 pg    Immature Retic fraction 24.3 (H) <=16.0 %   Blood Culture    Specimen: Peripheral Venipuncture; Blood  culture   Result Value Ref Range    Blood Culture Loaded on Instrument - Culture in progress    Lactate   Result Value Ref Range    Lactate 1.2 0.4 - 2.0 mmol/L   Urinalysis with Reflex Culture and Microscopic   Result Value Ref Range    Color, Urine Yellow Light-Yellow, Yellow, Dark-Yellow    Appearance, Urine Clear Clear    Specific Gravity, Urine 1.022 1.005 - 1.035    pH, Urine 7.5 5.0, 5.5, 6.0, 6.5, 7.0, 7.5, 8.0    Protein, Urine 10 (TRACE) NEGATIVE, 10 (TRACE), 20 (TRACE) mg/dL    Glucose, Urine Normal Normal mg/dL    Blood, Urine NEGATIVE NEGATIVE    Ketones, Urine NEGATIVE NEGATIVE mg/dL    Bilirubin, Urine NEGATIVE NEGATIVE    Urobilinogen, Urine 2 (1+) (A) Normal mg/dL    Nitrite, Urine NEGATIVE NEGATIVE    Leukocyte Esterase, Urine NEGATIVE NEGATIVE   Extra Urine Gray Tube   Result Value Ref Range    Extra Tube Hold for add-ons.    Urinalysis Microscopic   Result Value Ref Range    WBC, Urine NONE 1-5, NONE /HPF    RBC, Urine 6-10 (A) NONE, 1-2, 3-5 /HPF   CBC and Auto Differential   Result Value Ref Range    WBC 8.6 4.4 - 11.3 x10*3/uL    nRBC 0.0 0.0 - 0.0 /100 WBCs    RBC 2.90 (L) 4.50 - 5.90 x10*6/uL    Hemoglobin 8.0 (L) 13.5 - 17.5 g/dL    Hematocrit 23.3 (L) 41.0 - 52.0 %    MCV 80 80 - 100 fL    MCH 27.6 26.0 - 34.0 pg    MCHC 34.3 32.0 - 36.0 g/dL    RDW 15.4 (H) 11.5 - 14.5 %    Platelets 341 150 - 450 x10*3/uL    Neutrophils % 61.0 40.0 - 80.0 %    Immature Granulocytes %, Automated 1.4 (H) 0.0 - 0.9 %    Lymphocytes % 25.8 13.0 - 44.0 %    Monocytes % 9.2 2.0 - 10.0 %    Eosinophils % 2.0 0.0 - 6.0 %    Basophils % 0.6 0.0 - 2.0 %    Neutrophils Absolute 5.24 1.60 - 5.50 x10*3/uL    Immature Granulocytes Absolute, Automated 0.12 0.00 - 0.50 x10*3/uL    Lymphocytes Absolute 2.22 0.80 - 3.00 x10*3/uL    Monocytes Absolute 0.79 0.05 - 0.80 x10*3/uL    Eosinophils Absolute 0.17 0.00 - 0.40 x10*3/uL    Basophils Absolute 0.05 0.00 - 0.10 x10*3/uL   Renal function panel   Result Value Ref Range     Glucose 106 (H) 74 - 99 mg/dL    Sodium 134 (L) 136 - 145 mmol/L    Potassium 3.8 3.5 - 5.3 mmol/L    Chloride 102 98 - 107 mmol/L    Bicarbonate 24 21 - 32 mmol/L    Anion Gap 12 10 - 20 mmol/L    Urea Nitrogen 10 6 - 23 mg/dL    Creatinine 0.67 0.50 - 1.30 mg/dL    eGFR >90 >60 mL/min/1.73m*2    Calcium 7.8 (L) 8.6 - 10.6 mg/dL    Phosphorus 3.2 2.5 - 4.9 mg/dL    Albumin 2.7 (L) 3.4 - 5.0 g/dL   Magnesium   Result Value Ref Range    Magnesium 1.92 1.60 - 2.40 mg/dL   Lipid panel   Result Value Ref Range    Cholesterol 62 0 - 199 mg/dL    HDL-Cholesterol 13.9 mg/dL    Cholesterol/HDL Ratio 4.5     LDL Calculated 34 <=99 mg/dL    VLDL 14 0 - 40 mg/dL    Triglycerides 69 0 - 149 mg/dL    Non HDL Cholesterol 48 0 - 149 mg/dL   POCT GLUCOSE   Result Value Ref Range    POCT Glucose 95 74 - 99 mg/dL      Lab Results   Component Value Date    HGBA1C 5.6 11/25/2024      Lab Results   Component Value Date    CRP 16.25 (H) 11/25/2024      Lab Results   Component Value Date    SEDRATE 96 (H) 11/25/2024        Results from last 7 days   Lab Units 11/26/24  0457   WBC AUTO x10*3/uL 8.6   RBC AUTO x10*6/uL 2.90*   HEMOGLOBIN g/dL 8.0*   HEMATOCRIT % 23.3*     Results from last 7 days   Lab Units 11/26/24  0457 11/25/24  1229   SODIUM mmol/L 134* 135*   POTASSIUM mmol/L 3.8 4.2   CHLORIDE mmol/L 102 102   CO2 mmol/L 24 21   BUN mg/dL 10 17   CREATININE mg/dL 0.67 0.80   CALCIUM mg/dL 7.8* 8.9   PHOSPHORUS mg/dL 3.2 2.5   MAGNESIUM mg/dL 1.92  --    BILIRUBIN TOTAL mg/dL  --  1.3*   ALT U/L  --  59*   AST U/L  --  37     Results from last 7 days   Lab Units 11/25/24  1911   COLOR U  Yellow   APPEARANCE U  Clear   PH U  7.5   SPEC GRAV UR  1.022   PROTEIN U mg/dL 10 (TRACE)   BLOOD UR  NEGATIVE   NITRITE U  NEGATIVE   WBC UR /HPF NONE       IMAGING REVIEW:  ECG 12 lead    Result Date: 11/25/2024  Atrial flutter with variable AV block Abnormal ECG No previous ECGs available See ED provider note for full interpretation and clinical  correlation Confirmed by Lo Leong ([id number]) on 11/25/2024 11:23:37 PM    MR foot right w and wo IV contrast    Result Date: 11/25/2024  Interpreted By:  Amaury Rodriguez, STUDY: MRI of the right foot without and with IV contrast;   INDICATION: Signs/Symptoms:History of chronic osteomyelitis with concern of new or worsening infection     COMPARISON: Radiographs from earlier the same day   ACCESSION NUMBER(S): [id number]   ORDERING CLINICIAN: ALEXANDRA MCDERMOTT   TECHNIQUE: Multiplanar multisequence MRI of the right foot was performed without and with IV contrast. 18 mL of Dotarem were administered intravenously   FINDINGS: Soft tissues: There is a large plantar skin and soft tissue defect at the midfoot underlying the cuboid. There is associated suggestive of moderate severe cellulitis in the subcutaneous soft tissues of the midfoot. No discrete fluid collection seen suggest presence of an abscess.   Bones:  There is severe marrow edema with loss of the T1 signal involving the cuboid. There is severe marrow edema with loss of the T1 signal involving the base of the 4th and 5th metatarsals as well. There is osseous fragmentation with mild marrow edema involving the remainder of the midfoot osseous structures articulations to include the base of the 1st, 2nd and 3rd metatarsals and the cuneiform seen as well as the navicular. There is destructive arthropathy in the midfoot with subluxation of the tarsometatarsal joints. There is increased amount of fluid in the talonavicular joint predominantly.   Muscles:  There is severe are muscle edema in the plantar musculature with atrophy. Underlying myositis in the setting of the ulcer is highly differential along with diabetic ischemic myopathy.   Miscellaneous:  There is mild tenosynovitis of the flexor and peroneal tendons, nonspecific.       1. Findings consistent with osteomyelitis involving the cuboid and the base of the 4th and 5th metatarsal underlying a large skin  and soft tissue defect. Associated osteomyelitis in the base of the 1st through 3rd metatarsals and the cuneiforms may also be present although changes of neuropathic arthropathy is considered most likely at these locations 2. Large plantar skin and soft tissue defect the midfoot with associated cellulitis. No evidence of a soft tissue abscess. 3. 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 4. Severe plantar muscular edema with myositis and diabetic ischemic myopathy in the differential.   MACRO: None   Signed by: Amaury Rodriguez 11/25/2024 7:07 PM Dictation workstation:   [id number]    XR foot right 3+ views    Result Date: 11/25/2024  Interpreted By:  Amaury Rodriguez, STUDY: XR FOOT RIGHT 3+ VIEWS; XR FOOT LEFT 3+ VIEWS; ;  11/25/2024 12:51 pm   INDICATION: Signs/Symptoms:foot ulcer.     COMPARISON: None.   ACCESSION NUMBER(S): [id number]; [id number]   ORDERING CLINICIAN: ALEXANDRA MCDERMOTT   FINDINGS: Bilateral feet, three views of each   On the right there is destructive arthropathy in the midfoot articulations with fragmentation and subluxation. There is a large skin and soft tissue defect at the plantar aspect of the midfoot extending to the level of the bone. Underlying osteomyelitis in the midfoot is highly suspected. There is marked soft tissue edema as well.   On the left there is partial amputation of the 1st toe. In addition there is arthroplasty of the 2nd PIP joint. There is no osseous destruction or erosions seen. There is mild degenerative changes in the midfoot as well   No fracture or dislocation seen       Findings highly concerning for osteomyelitis involving the right midfoot at its plantar aspect. Associated severe neuropathic arthropathy present. Postsurgical changes in the left without evidence of osteomyelitis radiographically     MACRO: None    Signed by: Amaury Rodriguez 11/25/2024 1:03 PM Dictation workstation:   [id number]    XR foot left 3+ views    Result Date: 11/25/2024  Interpreted By:  Amaury Rodriguez, STUDY: XR FOOT RIGHT 3+ VIEWS; XR FOOT LEFT 3+ VIEWS; ;  11/25/2024 12:51 pm   INDICATION: Signs/Symptoms:foot ulcer.     COMPARISON: None.   ACCESSION NUMBER(S): [id number]; [id number]   ORDERING CLINICIAN: ALEXANDRA MCDERMOTT   FINDINGS: Bilateral feet, three views of each   On the right there is destructive arthropathy in the midfoot articulations with fragmentation and subluxation. There is a large skin and soft tissue defect at the plantar aspect of the midfoot extending to the level of the bone. Underlying osteomyelitis in the midfoot is highly suspected. There is marked soft tissue edema as well.   On the left there is partial amputation of the 1st toe. In addition there is arthroplasty of the 2nd PIP joint. There is no osseous destruction or erosions seen. There is mild degenerative changes in the midfoot as well   No fracture or dislocation seen       Findings highly concerning for osteomyelitis involving the right midfoot at its plantar aspect. Associated severe neuropathic arthropathy present. Postsurgical changes in the left without evidence of osteomyelitis radiographically     MACRO: None   Signed by: Amaury Rodriguez 11/25/2024 1:03 PM Dictation workstation:   [id number]            Assessment/Plan   ASSESSMENT & PLAN:    # Osteomyelitis right foot  # Non pressure ulcer with fat layer exposed B/L LE  # Charcot deformity right foot  # DM with peripheral neuropathy  # PVD    - Patient was seen and evaluated; all findings were discussed and all questions were answered to patient's satisfaction.    - Charts, labs, vitals and imaging all reviewed.     - Imaging:   X-ray right foot, IMPRESSION:  Findings highly concerning for osteomyelitis involving the right  midfoot at its plantar aspect. Associated severe neuropathic  arthropathy  present. Postsurgical changes in the left without  evidence of osteomyelitis radiographically    MRI   IMPRESSION:  1. Findings consistent with osteomyelitis involving the cuboid and  the base of the 4th and 5th metatarsal underlying a large skin and  soft tissue defect. Associated osteomyelitis in the base of the 1st  through 3rd metatarsals and the cuneiforms may also be present  although changes of neuropathic arthropathy is considered most likely  at these locations  2. Large plantar skin and soft tissue defect the midfoot with  associated cellulitis. No evidence of a soft tissue abscess.  3. 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  4. Severe plantar muscular edema with myositis and diabetic ischemic  myopathy in the differential.    - Labs: WBC 8.6, Glu 106, Na 134    - PVRs: PRELIMINARY CONCLUSIONS:  Right Lower PVR: Evidence of mild arterial occlusive disease in the right lower extremity at rest. Decreased digital perfusion noted. Monophasic flow is noted in the right posterior tibial artery. Multiphasic flow is noted in the right common femoral artery.  Left Lower PVR: No evidence of arterial occlusive disease in the left lower extremity at rest. Multiphasic flow is noted in the left common femoral artery, left posterior tibial artery and left dorsalis pedis artery.     RIGHT Lower PVR                Pressures Ratios  Right Posterior Tibial (Ankle) 96 mmHg   0.89  Right Dorsalis Pedis (Ankle)   91 mmHg   0.84  Right Digit (Great Toe)        0 mmHg    0.00      LEFT Lower PVR                Pressures Ratios  Left Posterior Tibial (Ankle) 117 mmHg  1.08  Left Dorsalis Pedis (Ankle)   107 mmHg  0.99    - Wound culture: Pending  - Blood culture: Pending    Plan:  - Abx: Continue antibiotic per primary/ID  - Patient has osteomyelitis and charcot deformity of the right  foot. His foot is deemed unsalvageable. BKA was discussed with the patient, and he appears to be agreeable to the procedure.   - Recommend vascular consult for BKA  - Recommend betadine dressing WTD for right plantar foot, Mepilex dressing to right lateral LE and Left foot.   - Weight bearing to the heel of the right foot as telerated.   - Podiatry to sign off.   - Case discussed with attending Dr. Michael DPM.        Terrell Blum DPM PGY-1  Podiatric Medicine and Surgery   Epic Secure Chat          SIGNATURE: Terrell Blum DPM PATIENT NAME: Dustin Ace   DATE: November 26, 2024 MRN: [id number]   TIME: 10:43 AM CONTACT: Haiku message

## 2024-11-26 NOTE — CONSULTS
VASCULAR SURGERY CONSULT NOTE    Assessment/Plan   Dustin Ace is 74 y.o. male with history of emphysema, BPH, CVA hx, PAD, Afib (on Eliquis), HTN, DM who presented to the ED 11/25 with nonhealing wound of his right foot, fevers, chronic osteomyelitis. Right foot has been deemed unsalvageable. He is amenable to a R BKA.   Although febrile on arrival, vitals have stabilized, WBC count normalized on vanc/zosyn.     Plan:  Will plan on BKA on Friday  Continue antibiotics  Okay for diet  Consider bridging Eliquis with heparin gtt given stroke risk    D/w attending, Dr. Kody Brown MD  General Surgery PGY-3  Vascular Surgery [id number]    Subjective   HPI:  Dustin Ace is 74 y.o. male with history of BPH, CVA hx, PAD, Afib (on Eliquis), HTN, DM who presented to the ED 11/25 with nonhealing wound of his right foot, fevers, chronic osteomyelitis. He has had the wounds for about 2-3 years and has been following with wound care / had multiple hospitalizations for this. Podiatry has seen him here this visit and deemed his right foot unsalvageable and recommended a BKA which he is amenable to.   He has had fevers and chills for a little over the past day. He denies any cough or cold symptoms, chest pain, shortness of breath, N/V, abdominal pain.     Vascular History:  Denies any history of stents, angiograms, balloon angioplasty, bypasses in extremities or heart    PMH: BPH, CVA hx, PAD, Afib (on Eliquis), HTN, DM     PSH: Left toe amputation, wound debridements, denies any other surgical history    Home Meds:  No current facility-administered medications on file prior to encounter.     No current outpatient medications on file prior to encounter.    Tamsulosin, Gabapentin, Amlodipine, Eliquis    Allergies:  No Known Allergies    ROS: 12 system negative except HPI    Objective   Vitals:  Heart Rate:  [57-69]   Temperature:  [36.7 °C (98 °F)]   Respirations:  [14-17]   BP: (100-125)/(59-79)   Pulse Ox:  [96  %-99 %]     Exam:  Constitutional: No acute distress, resting comfortably  Neuro:  AOx3, grossly intact, some left eye ptosis at baseline  ENMT: moist mucous membranes  Head/neck: atraumatic  CV: no tachycardia  Pulm: non-labored on room air  GI: soft, non-tender, non-distended  Skin: warm and dry  Musculoskeletal: moving all extremities  Extremities: dry skin of both lower extremities with shallow wound on lateral right shin and deep wound with exposed bone of R plantar area  Palpable R femoral and popliteal, monophasic PT, multiphasic AT, L palpable femoral, PT    Labs:  Results from last 7 days   Lab Units 11/26/24  0457 11/25/24  1229   WBC AUTO x10*3/uL 8.6 12.4*   HEMOGLOBIN g/dL 8.0* 9.0*   PLATELETS AUTO x10*3/uL 341 377      Results from last 7 days   Lab Units 11/26/24  0457 11/25/24  1229   SODIUM mmol/L 134* 135*   POTASSIUM mmol/L 3.8 4.2   CHLORIDE mmol/L 102 102   CO2 mmol/L 24 21   BUN mg/dL 10 17   CREATININE mg/dL 0.67 0.80   GLUCOSE mg/dL 106* 210*   MAGNESIUM mg/dL 1.92  --    PHOSPHORUS mg/dL 3.2 2.5      Results from last 7 days   Lab Units 11/25/24  1229   INR  2.1*   PROTIME seconds 24.2*   APTT seconds 33           Imaging:  Reviewed independently by vascular team:  ARMANDO / Pvr  R DP 0.84, PT 0.89, Toe 0 - mild arterial occlusive disease, monophasic R PT flow  L DP 0.99, PT 1.08, multiphasic flow with no evidence of arterial disease

## 2024-11-26 NOTE — CONSULTS
PODIATRY CONSULT NOTE    SERVICE DATE: 11/26/2024   SERVICE TIME:  08:43 AM    REASON FOR CONSULT: Chronic R foot osteo, with worsening of wound   REQUESTING PHYSICIAN: Reid Luke DO   PRIMARY CARE PHYSICIAN: No Assigned PCP Generic Provider, MD    Subjective   HPI:  Mr. Ace is a 74 y.o. male with past medical history of emphysema, BPH, chronic hepatitis C (treated), CVA, PAD, Afib, HTN, DM, Charcot deformity, and chronic right foot osteomyelitis presenting a with right foot wound. The patient reports that his issue began over two years ago, with recurrent hospitals visits for wound management. His left great toe was amputated in July. On 11/11/24, he noticed a sore on the right side of his foot, which was treated, and he was discharged with wound care instruction. He presented to the ED yesterday due to an increase in pus and drainage from the wound, which started approximately one week ago. He reports limited mobility primarily walking short distances within the home, and uses cane for ambulation. Patient also reports fevers and chills over the past week but denies headache, nausea, vomiting, chest pain and SOB.     Podiatry was consulted for chronic R foot osteomyelitis, and worsening of wound.    ROS: 10-point review of systems was performed and is otherwise negative except as noted in HPI.  PMH: Reviewed/documented below.  PSH:  Noncontributory except per HPI   FH: Reviewed and noncontributory   SOCIAL:  Reviewed/documented below.  ALLERGIES: Reviewed/documented below.  MEDS: Reviewed/documented below.  VS: Reviewed/documented below.    No past medical history on file.  No past surgical history on file.  No family history on file.      (Not in a hospital admission)       Medications:  Scheduled Meds: acetaminophen, 975 mg, oral, q8h  atorvastatin, 80 mg, oral, Nightly  B complex-vitamin C, 1 tablet, oral, Daily  cholecalciferol, 400 Units, oral, Daily  gabapentin, 300 mg, oral,  You may take 25-50 mg of Benadryl every 4-6 hours.  If Benadryl is making you too drowsy, you may take Zyrtec or Claritin instead. One tablet in the morning or 1 tablet at morning and night if needed.  Please go to ER immediately for shortness or breath, chest pain, difficulty swallowing, angioedema, or worsening rash.       TID  piperacillin-tazobactam, 4.5 g, intravenous, q6h  polyethylene glycol, 17 g, oral, Daily  tamsulosin, 0.4 mg, oral, Nightly  vancomycin, 1,000 mg, intravenous, q12h      Continuous Infusions:    PRN Meds: PRN medications: alum-mag hydroxide-simeth, oxyCODONE, vancomycin    Allergies as of 11/25/2024    (No Known Allergies)            Objective   PHYSICAL EXAM:  Physical Exam Performed:  Vitals:    11/26/24 0800   BP: 100/62   Pulse: 69   Resp: 16   Temp:    SpO2: 96%     Body mass index is 25 kg/m².    Patient is AOx3 and in no acute distress. Patient is alert and cooperative.       Vascular: Non-palpable DP/PT pulses B/L. Mild pitting edema noted B/L. Hair growth absent B/L. CFT<5 to B/L hallux. Temperature is warm to cool from tibial tuberosity to distal digits B/L.     Musculoskeletal: Gross active and passive ROM intact to age and activity level. Moves all extremities spontaneously. Charcot deformity noted to right foot. Left hallux amputation noted.     Neurological: Absent light touch sensation B/L. Pain stimuli absent B/L.     Dermatologic: Nails 1-5 are within normal limits for thickness and length B/L. Skin appears diffusely xerotic B/L. Web spaces 1-4 B/L are clean, dry and intact.     Wound: right plantar foot       Mixed wound base of fibro-granular tissue.   Mild serosanguinous drainage   Minimal jona-wound maceration.   Mild jona-wound erythema.   No evidence of ascending cellulitis or lymphangitis.  No palpable fluctuance. No malodor. No increased warmth.   Significant probe to bone or deep structures within the wound base.   Minimal undermining.     Wound: right lateral leg       Wound base mostly granular  No serosanguinous drainage with fibrotic slough.   No jona-wound maceration.   Minimal jona-wound erythema.   No evidence of ascending cellulitis or lymphangitis.  No palpable fluctuance. No malodor. No increased warmth.    No tunneling or tracking.   No undermining. Skin edges irregular but  intact.    Wound: Left foot      Wound base fibrotic tissue.   No serosanguinous drainage with fibrotic slough.   No jona-wound maceration.   No jona-wound erythema.   No evidence of ascending cellulitis or lymphangitis.  No palpable fluctuance. No malodor. No increased warmth.   No tunneling or tracking.   No undermining.     LABS:   Results for orders placed or performed during the hospital encounter of 11/25/24 (from the past 24 hours)   POCT GLUCOSE   Result Value Ref Range    POCT Glucose 224 (H) 74 - 99 mg/dL   ECG 12 lead   Result Value Ref Range    Ventricular Rate 88 BPM    Atrial Rate 293 BPM    QRS Duration 78 ms    QT Interval 368 ms    QTC Calculation(Bazett) 445 ms    P Axis 84 degrees    R Axis 89 degrees    T Axis 35 degrees    QRS Count 14 beats    Q Onset 219 ms    P Onset 130 ms    P Offset 169 ms    T Offset 403 ms    QTC Fredericia 418 ms   CBC and Auto Differential   Result Value Ref Range    WBC 12.4 (H) 4.4 - 11.3 x10*3/uL    nRBC 0.0 0.0 - 0.0 /100 WBCs    RBC 3.28 (L) 4.50 - 5.90 x10*6/uL    Hemoglobin 9.0 (L) 13.5 - 17.5 g/dL    Hematocrit 27.0 (L) 41.0 - 52.0 %    MCV 82 80 - 100 fL    MCH 27.4 26.0 - 34.0 pg    MCHC 33.3 32.0 - 36.0 g/dL    RDW 15.8 (H) 11.5 - 14.5 %    Platelets 377 150 - 450 x10*3/uL    Neutrophils % 77.9 40.0 - 80.0 %    Immature Granulocytes %, Automated 0.6 0.0 - 0.9 %    Lymphocytes % 12.7 13.0 - 44.0 %    Monocytes % 8.1 2.0 - 10.0 %    Eosinophils % 0.5 0.0 - 6.0 %    Basophils % 0.2 0.0 - 2.0 %    Neutrophils Absolute 9.65 (H) 1.60 - 5.50 x10*3/uL    Immature Granulocytes Absolute, Automated 0.08 0.00 - 0.50 x10*3/uL    Lymphocytes Absolute 1.57 0.80 - 3.00 x10*3/uL    Monocytes Absolute 1.00 (H) 0.05 - 0.80 x10*3/uL    Eosinophils Absolute 0.06 0.00 - 0.40 x10*3/uL    Basophils Absolute 0.03 0.00 - 0.10 x10*3/uL   Comprehensive metabolic panel   Result Value Ref Range    Glucose 210 (H) 74 - 99 mg/dL    Sodium 135 (L) 136 - 145 mmol/L    Potassium 4.2 3.5 - 5.3  mmol/L    Chloride 102 98 - 107 mmol/L    Bicarbonate 21 21 - 32 mmol/L    Anion Gap 16 10 - 20 mmol/L    Urea Nitrogen 17 6 - 23 mg/dL    Creatinine 0.80 0.50 - 1.30 mg/dL    eGFR >90 >60 mL/min/1.73m*2    Calcium 8.9 8.6 - 10.6 mg/dL    Albumin 3.2 (L) 3.4 - 5.0 g/dL    Alkaline Phosphatase 74 33 - 136 U/L    Total Protein 7.5 6.4 - 8.2 g/dL    AST 37 9 - 39 U/L    Bilirubin, Total 1.3 (H) 0.0 - 1.2 mg/dL    ALT 59 (H) 10 - 52 U/L   Lactate   Result Value Ref Range    Lactate 2.1 (H) 0.4 - 2.0 mmol/L   Protime-INR   Result Value Ref Range    Protime 24.2 (H) 9.8 - 12.8 seconds    INR 2.1 (H) 0.9 - 1.1   aPTT   Result Value Ref Range    aPTT 33 27 - 38 seconds   C-reactive protein   Result Value Ref Range    C-Reactive Protein 16.25 (H) <1.00 mg/dL   Bilirubin, Direct   Result Value Ref Range    Bilirubin, Direct 0.5 (H) 0.0 - 0.3 mg/dL   Iron and TIBC   Result Value Ref Range    Iron 25 (L) 35 - 150 ug/dL    UIBC 165 110 - 370 ug/dL    TIBC 190 (L) 240 - 445 ug/dL    % Saturation 13 (L) 25 - 45 %   Ferritin   Result Value Ref Range    Ferritin 1,087 (H) 20 - 300 ng/mL   Phosphorus   Result Value Ref Range    Phosphorus 2.5 2.5 - 4.9 mg/dL   Hemoglobin A1c   Result Value Ref Range    Hemoglobin A1C 5.6 See comment %    Estimated Average Glucose 114 Not Established mg/dL   Blood Culture    Specimen: Peripheral Venipuncture; Blood culture   Result Value Ref Range    Blood Culture Loaded on Instrument - Culture in progress    Lavender Top   Result Value Ref Range    Extra Tube Hold for add-ons.    PST Top   Result Value Ref Range    Extra Tube Hold for add-ons.    Sedimentation rate, automated   Result Value Ref Range    Sedimentation Rate 96 (H) 0 - 20 mm/h   Reticulocytes   Result Value Ref Range    Retic % 2.7 (H) 0.5 - 2.0 %    Retic Absolute 0.086 0.017 - 0.110 x10*6/uL    Reticulocyte Hemoglobin 29 28 - 38 pg    Immature Retic fraction 24.3 (H) <=16.0 %   Blood Culture    Specimen: Peripheral Venipuncture; Blood  culture   Result Value Ref Range    Blood Culture Loaded on Instrument - Culture in progress    Lactate   Result Value Ref Range    Lactate 1.2 0.4 - 2.0 mmol/L   Urinalysis with Reflex Culture and Microscopic   Result Value Ref Range    Color, Urine Yellow Light-Yellow, Yellow, Dark-Yellow    Appearance, Urine Clear Clear    Specific Gravity, Urine 1.022 1.005 - 1.035    pH, Urine 7.5 5.0, 5.5, 6.0, 6.5, 7.0, 7.5, 8.0    Protein, Urine 10 (TRACE) NEGATIVE, 10 (TRACE), 20 (TRACE) mg/dL    Glucose, Urine Normal Normal mg/dL    Blood, Urine NEGATIVE NEGATIVE    Ketones, Urine NEGATIVE NEGATIVE mg/dL    Bilirubin, Urine NEGATIVE NEGATIVE    Urobilinogen, Urine 2 (1+) (A) Normal mg/dL    Nitrite, Urine NEGATIVE NEGATIVE    Leukocyte Esterase, Urine NEGATIVE NEGATIVE   Extra Urine Gray Tube   Result Value Ref Range    Extra Tube Hold for add-ons.    Urinalysis Microscopic   Result Value Ref Range    WBC, Urine NONE 1-5, NONE /HPF    RBC, Urine 6-10 (A) NONE, 1-2, 3-5 /HPF   CBC and Auto Differential   Result Value Ref Range    WBC 8.6 4.4 - 11.3 x10*3/uL    nRBC 0.0 0.0 - 0.0 /100 WBCs    RBC 2.90 (L) 4.50 - 5.90 x10*6/uL    Hemoglobin 8.0 (L) 13.5 - 17.5 g/dL    Hematocrit 23.3 (L) 41.0 - 52.0 %    MCV 80 80 - 100 fL    MCH 27.6 26.0 - 34.0 pg    MCHC 34.3 32.0 - 36.0 g/dL    RDW 15.4 (H) 11.5 - 14.5 %    Platelets 341 150 - 450 x10*3/uL    Neutrophils % 61.0 40.0 - 80.0 %    Immature Granulocytes %, Automated 1.4 (H) 0.0 - 0.9 %    Lymphocytes % 25.8 13.0 - 44.0 %    Monocytes % 9.2 2.0 - 10.0 %    Eosinophils % 2.0 0.0 - 6.0 %    Basophils % 0.6 0.0 - 2.0 %    Neutrophils Absolute 5.24 1.60 - 5.50 x10*3/uL    Immature Granulocytes Absolute, Automated 0.12 0.00 - 0.50 x10*3/uL    Lymphocytes Absolute 2.22 0.80 - 3.00 x10*3/uL    Monocytes Absolute 0.79 0.05 - 0.80 x10*3/uL    Eosinophils Absolute 0.17 0.00 - 0.40 x10*3/uL    Basophils Absolute 0.05 0.00 - 0.10 x10*3/uL   Renal function panel   Result Value Ref Range     Glucose 106 (H) 74 - 99 mg/dL    Sodium 134 (L) 136 - 145 mmol/L    Potassium 3.8 3.5 - 5.3 mmol/L    Chloride 102 98 - 107 mmol/L    Bicarbonate 24 21 - 32 mmol/L    Anion Gap 12 10 - 20 mmol/L    Urea Nitrogen 10 6 - 23 mg/dL    Creatinine 0.67 0.50 - 1.30 mg/dL    eGFR >90 >60 mL/min/1.73m*2    Calcium 7.8 (L) 8.6 - 10.6 mg/dL    Phosphorus 3.2 2.5 - 4.9 mg/dL    Albumin 2.7 (L) 3.4 - 5.0 g/dL   Magnesium   Result Value Ref Range    Magnesium 1.92 1.60 - 2.40 mg/dL   Lipid panel   Result Value Ref Range    Cholesterol 62 0 - 199 mg/dL    HDL-Cholesterol 13.9 mg/dL    Cholesterol/HDL Ratio 4.5     LDL Calculated 34 <=99 mg/dL    VLDL 14 0 - 40 mg/dL    Triglycerides 69 0 - 149 mg/dL    Non HDL Cholesterol 48 0 - 149 mg/dL   POCT GLUCOSE   Result Value Ref Range    POCT Glucose 95 74 - 99 mg/dL      Lab Results   Component Value Date    HGBA1C 5.6 11/25/2024      Lab Results   Component Value Date    CRP 16.25 (H) 11/25/2024      Lab Results   Component Value Date    SEDRATE 96 (H) 11/25/2024        Results from last 7 days   Lab Units 11/26/24  0457   WBC AUTO x10*3/uL 8.6   RBC AUTO x10*6/uL 2.90*   HEMOGLOBIN g/dL 8.0*   HEMATOCRIT % 23.3*     Results from last 7 days   Lab Units 11/26/24  0457 11/25/24  1229   SODIUM mmol/L 134* 135*   POTASSIUM mmol/L 3.8 4.2   CHLORIDE mmol/L 102 102   CO2 mmol/L 24 21   BUN mg/dL 10 17   CREATININE mg/dL 0.67 0.80   CALCIUM mg/dL 7.8* 8.9   PHOSPHORUS mg/dL 3.2 2.5   MAGNESIUM mg/dL 1.92  --    BILIRUBIN TOTAL mg/dL  --  1.3*   ALT U/L  --  59*   AST U/L  --  37     Results from last 7 days   Lab Units 11/25/24  1911   COLOR U  Yellow   APPEARANCE U  Clear   PH U  7.5   SPEC GRAV UR  1.022   PROTEIN U mg/dL 10 (TRACE)   BLOOD UR  NEGATIVE   NITRITE U  NEGATIVE   WBC UR /HPF NONE       IMAGING REVIEW:  ECG 12 lead    Result Date: 11/25/2024  Atrial flutter with variable AV block Abnormal ECG No previous ECGs available See ED provider note for full interpretation and clinical  correlation Confirmed by Lo Leong ([id number]) on 11/25/2024 11:23:37 PM    MR foot right w and wo IV contrast    Result Date: 11/25/2024  Interpreted By:  Amaury Rodriguez, STUDY: MRI of the right foot without and with IV contrast;   INDICATION: Signs/Symptoms:History of chronic osteomyelitis with concern of new or worsening infection     COMPARISON: Radiographs from earlier the same day   ACCESSION NUMBER(S): [id number]   ORDERING CLINICIAN: ALEXANDRA MCDERMOTT   TECHNIQUE: Multiplanar multisequence MRI of the right foot was performed without and with IV contrast. 18 mL of Dotarem were administered intravenously   FINDINGS: Soft tissues: There is a large plantar skin and soft tissue defect at the midfoot underlying the cuboid. There is associated suggestive of moderate severe cellulitis in the subcutaneous soft tissues of the midfoot. No discrete fluid collection seen suggest presence of an abscess.   Bones:  There is severe marrow edema with loss of the T1 signal involving the cuboid. There is severe marrow edema with loss of the T1 signal involving the base of the 4th and 5th metatarsals as well. There is osseous fragmentation with mild marrow edema involving the remainder of the midfoot osseous structures articulations to include the base of the 1st, 2nd and 3rd metatarsals and the cuneiform seen as well as the navicular. There is destructive arthropathy in the midfoot with subluxation of the tarsometatarsal joints. There is increased amount of fluid in the talonavicular joint predominantly.   Muscles:  There is severe are muscle edema in the plantar musculature with atrophy. Underlying myositis in the setting of the ulcer is highly differential along with diabetic ischemic myopathy.   Miscellaneous:  There is mild tenosynovitis of the flexor and peroneal tendons, nonspecific.       1. Findings consistent with osteomyelitis involving the cuboid and the base of the 4th and 5th metatarsal underlying a large skin  and soft tissue defect. Associated osteomyelitis in the base of the 1st through 3rd metatarsals and the cuneiforms may also be present although changes of neuropathic arthropathy is considered most likely at these locations 2. Large plantar skin and soft tissue defect the midfoot with associated cellulitis. No evidence of a soft tissue abscess. 3. 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 4. Severe plantar muscular edema with myositis and diabetic ischemic myopathy in the differential.   MACRO: None   Signed by: Amaury Rodriguez 11/25/2024 7:07 PM Dictation workstation:   [id number]    XR foot right 3+ views    Result Date: 11/25/2024  Interpreted By:  Amaury Rodriguez, STUDY: XR FOOT RIGHT 3+ VIEWS; XR FOOT LEFT 3+ VIEWS; ;  11/25/2024 12:51 pm   INDICATION: Signs/Symptoms:foot ulcer.     COMPARISON: None.   ACCESSION NUMBER(S): [id number]; [id number]   ORDERING CLINICIAN: ALEXANDRA MCDERMOTT   FINDINGS: Bilateral feet, three views of each   On the right there is destructive arthropathy in the midfoot articulations with fragmentation and subluxation. There is a large skin and soft tissue defect at the plantar aspect of the midfoot extending to the level of the bone. Underlying osteomyelitis in the midfoot is highly suspected. There is marked soft tissue edema as well.   On the left there is partial amputation of the 1st toe. In addition there is arthroplasty of the 2nd PIP joint. There is no osseous destruction or erosions seen. There is mild degenerative changes in the midfoot as well   No fracture or dislocation seen       Findings highly concerning for osteomyelitis involving the right midfoot at its plantar aspect. Associated severe neuropathic arthropathy present. Postsurgical changes in the left without evidence of osteomyelitis radiographically     MACRO: None    Signed by: Amaury Rodriguez 11/25/2024 1:03 PM Dictation workstation:   [id number]    XR foot left 3+ views    Result Date: 11/25/2024  Interpreted By:  Amaury Rodriguez, STUDY: XR FOOT RIGHT 3+ VIEWS; XR FOOT LEFT 3+ VIEWS; ;  11/25/2024 12:51 pm   INDICATION: Signs/Symptoms:foot ulcer.     COMPARISON: None.   ACCESSION NUMBER(S): [id number]; [id number]   ORDERING CLINICIAN: ALEXANDRA MCDERMOTT   FINDINGS: Bilateral feet, three views of each   On the right there is destructive arthropathy in the midfoot articulations with fragmentation and subluxation. There is a large skin and soft tissue defect at the plantar aspect of the midfoot extending to the level of the bone. Underlying osteomyelitis in the midfoot is highly suspected. There is marked soft tissue edema as well.   On the left there is partial amputation of the 1st toe. In addition there is arthroplasty of the 2nd PIP joint. There is no osseous destruction or erosions seen. There is mild degenerative changes in the midfoot as well   No fracture or dislocation seen       Findings highly concerning for osteomyelitis involving the right midfoot at its plantar aspect. Associated severe neuropathic arthropathy present. Postsurgical changes in the left without evidence of osteomyelitis radiographically     MACRO: None   Signed by: Amaury Rodriguez 11/25/2024 1:03 PM Dictation workstation:   [id number]            Assessment/Plan   ASSESSMENT & PLAN:    # Osteomyelitis right foot  # Non pressure ulcer with fat layer exposed B/L LE  # Charcot deformity right foot  # DM with peripheral neuropathy  # PVD    - Patient was seen and evaluated; all findings were discussed and all questions were answered to patient's satisfaction.    - Charts, labs, vitals and imaging all reviewed.     - Imaging:   X-ray right foot, IMPRESSION:  Findings highly concerning for osteomyelitis involving the right  midfoot at its plantar aspect. Associated severe neuropathic  arthropathy  present. Postsurgical changes in the left without  evidence of osteomyelitis radiographically    MRI   IMPRESSION:  1. Findings consistent with osteomyelitis involving the cuboid and  the base of the 4th and 5th metatarsal underlying a large skin and  soft tissue defect. Associated osteomyelitis in the base of the 1st  through 3rd metatarsals and the cuneiforms may also be present  although changes of neuropathic arthropathy is considered most likely  at these locations  2. Large plantar skin and soft tissue defect the midfoot with  associated cellulitis. No evidence of a soft tissue abscess.  3. 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  4. Severe plantar muscular edema with myositis and diabetic ischemic  myopathy in the differential.    - Labs: WBC 8.6, Glu 106, Na 134    - PVRs: PRELIMINARY CONCLUSIONS:  Right Lower PVR: Evidence of mild arterial occlusive disease in the right lower extremity at rest. Decreased digital perfusion noted. Monophasic flow is noted in the right posterior tibial artery. Multiphasic flow is noted in the right common femoral artery.  Left Lower PVR: No evidence of arterial occlusive disease in the left lower extremity at rest. Multiphasic flow is noted in the left common femoral artery, left posterior tibial artery and left dorsalis pedis artery.     RIGHT Lower PVR                Pressures Ratios  Right Posterior Tibial (Ankle) 96 mmHg   0.89  Right Dorsalis Pedis (Ankle)   91 mmHg   0.84  Right Digit (Great Toe)        0 mmHg    0.00      LEFT Lower PVR                Pressures Ratios  Left Posterior Tibial (Ankle) 117 mmHg  1.08  Left Dorsalis Pedis (Ankle)   107 mmHg  0.99    - Wound culture: Pending  - Blood culture: Pending    Plan:  - Abx: Continue antibiotic per primary/ID  - Patient has osteomyelitis and charcot deformity of the right  foot. His foot is deemed unsalvageable. BKA was discussed with the patient, and he appears to be agreeable to the procedure.   - Recommend vascular consult for BKA  - Recommend betadine dressing WTD for right plantar foot, Mepilex dressing to right lateral LE and Left foot.   - Weight bearing to the heel of the right foot as telerated.   - Podiatry to sign off.   - Case discussed with attending Dr. Michael DPM.        Terrell Blum DPM PGY-1  Podiatric Medicine and Surgery   Epic Secure Chat          SIGNATURE: Terrell Blum DPM PATIENT NAME: Dustin Ace   DATE: November 26, 2024 MRN: [id number]   TIME: 10:43 AM CONTACT: Haiku message

## 2024-11-26 NOTE — H&P
History Of Present Illness  Dustin Ace is a 74 y.o. male presenting with osteomyelitis of right foot, right ankle wound, and left foot wound. PMHx is significant for diet controlled T2DM, PAD, HLD, HTN. Patient has previously been followed by podiatry at [facility], where he received multiple procedures: right foot wound I/D and removal of nonviable soft tissue and bone (5/24), right foot wound I/D (7/5), and left PIPJ second digit arthroplasty (7/18). Post-procedure courses were uncomplicated and patient was discharged to SNF for wound care, followed by [facility] nurse for dressing change. Patient has been followed by podiatry and has been using walker, cane, and diabetic walking boots for ambulation.    Patient reports he has been dressing his wounds since 11/11 and about a week ago noticed pus being discharged from his right foot. Patient also reports he has been feeling chills and fevers since then. Patient presented himself to the ED for wound care. Vitals showed patient was febrile and tachycardic, and patient was started on empiric vancomycin and zosyn.    In the Emergency Department/Upon arrival to the Floor/Unit,   - Vitals:  Vitals:    11/25/24 2012   BP: 123/62   Pulse: 69   Resp: 16   Temp:    SpO2: 97%     - Labs:  Results for orders placed or performed during the hospital encounter of 11/25/24 (from the past 24 hours)   POCT GLUCOSE   Result Value Ref Range    POCT Glucose 224 (H) 74 - 99 mg/dL   ECG 12 lead   Result Value Ref Range    Ventricular Rate 88 BPM    Atrial Rate 293 BPM    QRS Duration 78 ms    QT Interval 368 ms    QTC Calculation(Bazett) 445 ms    P Axis 84 degrees    R Axis 89 degrees    T Axis 35 degrees    QRS Count 14 beats    Q Onset 219 ms    P Onset 130 ms    P Offset 169 ms    T Offset 403 ms    QTC Fredericia 418 ms   CBC and Auto Differential   Result Value Ref Range    WBC 12.4 (H) 4.4 - 11.3 x10*3/uL    nRBC 0.0 0.0 - 0.0 /100 WBCs    RBC 3.28 (L) 4.50 - 5.90 x10*6/uL     Hemoglobin 9.0 (L) 13.5 - 17.5 g/dL    Hematocrit 27.0 (L) 41.0 - 52.0 %    MCV 82 80 - 100 fL    MCH 27.4 26.0 - 34.0 pg    MCHC 33.3 32.0 - 36.0 g/dL    RDW 15.8 (H) 11.5 - 14.5 %    Platelets 377 150 - 450 x10*3/uL    Neutrophils % 77.9 40.0 - 80.0 %    Immature Granulocytes %, Automated 0.6 0.0 - 0.9 %    Lymphocytes % 12.7 13.0 - 44.0 %    Monocytes % 8.1 2.0 - 10.0 %    Eosinophils % 0.5 0.0 - 6.0 %    Basophils % 0.2 0.0 - 2.0 %    Neutrophils Absolute 9.65 (H) 1.60 - 5.50 x10*3/uL    Immature Granulocytes Absolute, Automated 0.08 0.00 - 0.50 x10*3/uL    Lymphocytes Absolute 1.57 0.80 - 3.00 x10*3/uL    Monocytes Absolute 1.00 (H) 0.05 - 0.80 x10*3/uL    Eosinophils Absolute 0.06 0.00 - 0.40 x10*3/uL    Basophils Absolute 0.03 0.00 - 0.10 x10*3/uL   Comprehensive metabolic panel   Result Value Ref Range    Glucose 210 (H) 74 - 99 mg/dL    Sodium 135 (L) 136 - 145 mmol/L    Potassium 4.2 3.5 - 5.3 mmol/L    Chloride 102 98 - 107 mmol/L    Bicarbonate 21 21 - 32 mmol/L    Anion Gap 16 10 - 20 mmol/L    Urea Nitrogen 17 6 - 23 mg/dL    Creatinine 0.80 0.50 - 1.30 mg/dL    eGFR >90 >60 mL/min/1.73m*2    Calcium 8.9 8.6 - 10.6 mg/dL    Albumin 3.2 (L) 3.4 - 5.0 g/dL    Alkaline Phosphatase 74 33 - 136 U/L    Total Protein 7.5 6.4 - 8.2 g/dL    AST 37 9 - 39 U/L    Bilirubin, Total 1.3 (H) 0.0 - 1.2 mg/dL    ALT 59 (H) 10 - 52 U/L   Lactate   Result Value Ref Range    Lactate 2.1 (H) 0.4 - 2.0 mmol/L   Protime-INR   Result Value Ref Range    Protime 24.2 (H) 9.8 - 12.8 seconds    INR 2.1 (H) 0.9 - 1.1   aPTT   Result Value Ref Range    aPTT 33 27 - 38 seconds   C-reactive protein   Result Value Ref Range    C-Reactive Protein 16.25 (H) <1.00 mg/dL   Bilirubin, Direct   Result Value Ref Range    Bilirubin, Direct 0.5 (H) 0.0 - 0.3 mg/dL   Iron and TIBC   Result Value Ref Range    Iron 25 (L) 35 - 150 ug/dL    UIBC 165 110 - 370 ug/dL    TIBC 190 (L) 240 - 445 ug/dL    % Saturation 13 (L) 25 - 45 %   Ferritin    Result Value Ref Range    Ferritin 1,087 (H) 20 - 300 ng/mL   Phosphorus   Result Value Ref Range    Phosphorus 2.5 2.5 - 4.9 mg/dL   Blood Culture    Specimen: Peripheral Venipuncture; Blood culture   Result Value Ref Range    Blood Culture Loaded on Instrument - Culture in progress    Lavender Top   Result Value Ref Range    Extra Tube Hold for add-ons.    PST Top   Result Value Ref Range    Extra Tube Hold for add-ons.    Sedimentation rate, automated   Result Value Ref Range    Sedimentation Rate 96 (H) 0 - 20 mm/h   Reticulocytes   Result Value Ref Range    Retic % 2.7 (H) 0.5 - 2.0 %    Retic Absolute 0.086 0.017 - 0.110 x10*6/uL    Reticulocyte Hemoglobin 29 28 - 38 pg    Immature Retic fraction 24.3 (H) <=16.0 %   Blood Culture    Specimen: Peripheral Venipuncture; Blood culture   Result Value Ref Range    Blood Culture Loaded on Instrument - Culture in progress    Lactate   Result Value Ref Range    Lactate 1.2 0.4 - 2.0 mmol/L   Urinalysis with Reflex Culture and Microscopic   Result Value Ref Range    Color, Urine Yellow Light-Yellow, Yellow, Dark-Yellow    Appearance, Urine Clear Clear    Specific Gravity, Urine 1.022 1.005 - 1.035    pH, Urine 7.5 5.0, 5.5, 6.0, 6.5, 7.0, 7.5, 8.0    Protein, Urine 10 (TRACE) NEGATIVE, 10 (TRACE), 20 (TRACE) mg/dL    Glucose, Urine Normal Normal mg/dL    Blood, Urine NEGATIVE NEGATIVE    Ketones, Urine NEGATIVE NEGATIVE mg/dL    Bilirubin, Urine NEGATIVE NEGATIVE    Urobilinogen, Urine 2 (1+) (A) Normal mg/dL    Nitrite, Urine NEGATIVE NEGATIVE    Leukocyte Esterase, Urine NEGATIVE NEGATIVE   Urinalysis Microscopic   Result Value Ref Range    WBC, Urine NONE 1-5, NONE /HPF    RBC, Urine 6-10 (A) NONE, 1-2, 3-5 /HPF     - Imaging:  MR foot right w and wo IV contrast (11/25/24):  IMPRESSION:  1. Findings consistent with osteomyelitis involving the cuboid and  the base of the 4th and 5th metatarsal underlying a large skin and  soft tissue defect. Associated osteomyelitis  in the base of the 1st  through 3rd metatarsals and the cuneiforms may also be present  although changes of neuropathic arthropathy is considered most likely  at these locations  2. Large plantar skin and soft tissue defect the midfoot with  associated cellulitis. No evidence of a soft tissue abscess.  3. 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  4. Severe plantar muscular edema with myositis and diabetic ischemic  myopathy in the differential.    XR foot right/left 3 + views (11/25/24):  IMPRESSION:  Findings highly concerning for osteomyelitis involving the right  midfoot at its plantar aspect. Associated severe neuropathic  arthropathy present. Postsurgical changes in the left without  evidence of osteomyelitis radiographically    Past Medical History  BPH, Diet controlled T2DM, HTN, HLD, PAD, Afib, Neuropathy, Osteomyelitis    Surgical History  - Left hallux amputation April 2023  - Right foot wound I/D and debridement 5/24/24  - Right foot wound I/D 7/5/24  - Left PIPJ second digit arthroplasty 7/18/24  - Prosthetic eye placement, left, date unspecified     Social History  - Denies current tobacco use, quit approximately 20 years ago.  - Denies current alcohol use, quit approximately 2-3 years ago.  - Denies any other substance use.    Family History  - Diabetes (mother), HTN (father)     Allergies  Patient has no known allergies.    Review of Systems   Constitutional:  Positive for chills and fatigue.   HENT:  Positive for rhinorrhea.    Eyes: Negative.    Respiratory: Negative.     Cardiovascular: Negative.    Gastrointestinal: Negative.    Endocrine: Positive for polyuria.   Genitourinary:  Positive for urgency.   Musculoskeletal:  Positive for neck pain.   Skin:  Positive for wound (right ankle, right foot, left foot).   Allergic/Immunologic: Negative.     Neurological: Negative.    Hematological: Negative.    Psychiatric/Behavioral: Negative.       Last Recorded Vitals  /62 (BP Location: Left arm, Patient Position: Lying)   Pulse 69   Temp 36.1 °C (97 °F)   Resp 16   Wt 90.7 kg (200 lb)   SpO2 97%     Physical Exam  Constitutional:       General: He is not in acute distress.     Appearance: Normal appearance. He is normal weight. He is not ill-appearing.   Cardiovascular:      Pulses:           Dorsalis pedis pulses are detected w/ Doppler on the right side and detected w/ Doppler on the left side.        Posterior tibial pulses are detected w/ Doppler on the right side and detected w/ Doppler on the left side.   Feet:      Right foot:      Skin integrity: Skin breakdown (pus present) present.      Left foot:      Skin integrity: Ulcer present.      Comments: Dorsalis pedis and post tibialis pulses weaker on right-side.  Neurological:      Mental Status: He is alert.      Sensory: Sensory deficit (below knee bilaterally, absent sensation on foot bilaterally, preserved proprioception) present.       Relevant Results        Imaging  MR foot right w and wo IV contrast    Result Date: 11/25/2024  Interpreted By:  Amaury Rodriguez, STUDY: MRI of the right foot without and with IV contrast;   INDICATION: Signs/Symptoms:History of chronic osteomyelitis with concern of new or worsening infection     COMPARISON: Radiographs from earlier the same day   ACCESSION NUMBER(S): [id number]   ORDERING CLINICIAN: ALEXANDRA MCDERMOTT   TECHNIQUE: Multiplanar multisequence MRI of the right foot was performed without and with IV contrast. 18 mL of Dotarem were administered intravenously   FINDINGS: Soft tissues: There is a large plantar skin and soft tissue defect at the midfoot underlying the cuboid. There is associated suggestive of moderate severe cellulitis in the subcutaneous soft tissues of the midfoot. No discrete fluid collection seen suggest presence of an abscess.    Bones:  There is severe marrow edema with loss of the T1 signal involving the cuboid. There is severe marrow edema with loss of the T1 signal involving the base of the 4th and 5th metatarsals as well. There is osseous fragmentation with mild marrow edema involving the remainder of the midfoot osseous structures articulations to include the base of the 1st, 2nd and 3rd metatarsals and the cuneiform seen as well as the navicular. There is destructive arthropathy in the midfoot with subluxation of the tarsometatarsal joints. There is increased amount of fluid in the talonavicular joint predominantly.   Muscles:  There is severe are muscle edema in the plantar musculature with atrophy. Underlying myositis in the setting of the ulcer is highly differential along with diabetic ischemic myopathy.   Miscellaneous:  There is mild tenosynovitis of the flexor and peroneal tendons, nonspecific.       1. Findings consistent with osteomyelitis involving the cuboid and the base of the 4th and 5th metatarsal underlying a large skin and soft tissue defect. Associated osteomyelitis in the base of the 1st through 3rd metatarsals and the cuneiforms may also be present although changes of neuropathic arthropathy is considered most likely at these locations 2. Large plantar skin and soft tissue defect the midfoot with associated cellulitis. No evidence of a soft tissue abscess. 3. 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 4. Severe plantar muscular edema with myositis and diabetic ischemic myopathy in the differential.   MACRO: None   Signed by: Amaury Rodriguez 11/25/2024 7:07 PM Dictation workstation:   [id number]    XR foot right 3+ views    Result Date: 11/25/2024  Interpreted By:  Amaury Rodriguez, STUDY: XR FOOT RIGHT 3+ VIEWS; XR FOOT LEFT 3+ VIEWS; ;  11/25/2024 12:51 pm   INDICATION:  Signs/Symptoms:foot ulcer.     COMPARISON: None.   ACCESSION NUMBER(S): [id number]; [id number]   ORDERING CLINICIAN: ALEXANDRA MCDERMOTT   FINDINGS: Bilateral feet, three views of each   On the right there is destructive arthropathy in the midfoot articulations with fragmentation and subluxation. There is a large skin and soft tissue defect at the plantar aspect of the midfoot extending to the level of the bone. Underlying osteomyelitis in the midfoot is highly suspected. There is marked soft tissue edema as well.   On the left there is partial amputation of the 1st toe. In addition there is arthroplasty of the 2nd PIP joint. There is no osseous destruction or erosions seen. There is mild degenerative changes in the midfoot as well   No fracture or dislocation seen       Findings highly concerning for osteomyelitis involving the right midfoot at its plantar aspect. Associated severe neuropathic arthropathy present. Postsurgical changes in the left without evidence of osteomyelitis radiographically     MACRO: None   Signed by: Amaury Rodriguez 11/25/2024 1:03 PM Dictation workstation:   [id number]    XR foot left 3+ views    Result Date: 11/25/2024  Interpreted By:  Amaury Rodriguez, STUDY: XR FOOT RIGHT 3+ VIEWS; XR FOOT LEFT 3+ VIEWS; ;  11/25/2024 12:51 pm   INDICATION: Signs/Symptoms:foot ulcer.     COMPARISON: None.   ACCESSION NUMBER(S): [id number]; [id number]   ORDERING CLINICIAN: ALEXANDRA MCDERMOTT   FINDINGS: Bilateral feet, three views of each   On the right there is destructive arthropathy in the midfoot articulations with fragmentation and subluxation. There is a large skin and soft tissue defect at the plantar aspect of the midfoot extending to the level of the bone. Underlying osteomyelitis in the midfoot is highly suspected. There is marked soft tissue edema as well.   On the left there is partial amputation of the 1st toe. In addition there is arthroplasty of the 2nd PIP joint. There is no osseous  destruction or erosions seen. There is mild degenerative changes in the midfoot as well   No fracture or dislocation seen       Findings highly concerning for osteomyelitis involving the right midfoot at its plantar aspect. Associated severe neuropathic arthropathy present. Postsurgical changes in the left without evidence of osteomyelitis radiographically     MACRO: None   Signed by: Amaury Rodriguez 11/25/2024 1:03 PM Dictation workstation:   [id number]      Assessment & Plan  Patient is 73yo male with pertinent PMHx of T2DM, PAD, HLD, HTN, and previous history of osteomyelitis of bilateral foot s/p multiple I/Ds and surgical amputations of left hallux, presenting to the ED with c/o care for his chronic infected wounds.    #Chronic osteomyelitis  #Concern for sepsis  #PAD  - Patient has history of osteomyelitis of bilateral foot and received multiple procedures on the foot.  - Patient meets SIRS criteria for sepsis (febrile, tachycardic, and source of infection i.e. right foot wound).  Plan:  - Continue with vancomycin and Zosyn.  - Follow-up with wound culture for adjustment of his antibiotics course.  - Ordered ABIs/TBIs  - Podiatry will follow-up in AM.    #Anemia of chronic disease  - Patient endorsed subjective fatigue for the past 4 months.  - Patient's RBC was at 9.0 with baseline between 12-13  - Low iron (25), low TIBC (190), and elevated ferritin (1,087) indicate anemia of chronic disease.  Plan:  - Follow up after underlying infection has been controlled.    #HTN  Plan:  - Discontinue amlodipine given possible sepsis.  - Reassess in the morning to restart amlodipine.    #T2DM  - Patient's blood glucose level has been at 200s.  - Possible inadequate control with current diet and stress hyperglycemia.  Plan:  - Continue monitoring with POC glucose.    #Afib  - Patient is taking apixaban for afib.  Plan:  - Discontinue apixaban for possible procedure.    #BPH  Plan:  - Continue with tamsulosin at current  dose.    Disposition: ###  Follow-ups: PCP, ###    F: s/p 500mL of fluid  E: PRN  N: Consistent carb  A: PIV  Bowel regimen: Miralax    DVT ppx: holding home apixaban  GI ppx: None    Code Status: Full code (confirmed on 11/25/2024)  Surrogate Medical Decision-maker: Daughter Neena Finley ([phone])    Notes written by FRANCISCO ADAMS MS3

## 2024-11-26 NOTE — H&P
History Of Present Illness  Dustin Ace is a 74 y.o. male with a history of BPH, centrilobular emphysema, chronic hepatitis C (treated), CVA, diabetes mellitus, hypertension, CVA, PAD, history of Charcot deformity, chronic right foot osteomyelitis, paroxysmal atrial fibrillation presenting for a chronic leg wound.     He states that this all started 2.5 years ago. He has been in and out of the hospital due to his leg wound. He states that at one time, his wound appeared to heal. However, 1-2 weeks after, it opened again and they had to operate. He also had his left great toe amputated in July. He states that on 11/11/24, he had a sore on the right side which was new, and he was sent home with instructions (no home health care nurse came). He did his own dressing changes. He came here for additional wound care. He also noticed green pus coming out of his wound starting approximately 1 week ago. He has only been walking to and from his kitchen, and he is mostly in his recliner or his bed (states that he does change positions). He uses his cane and endorses some weakness. He also uses his boot. Due to neuropathy, he does not feel trauma, ulcers, or pains in his foot.     He endorses swelling in the legs and keeps his legs elevated (mostly the right). States he has some skin breakdown at the ankle. He uses compression stockings or an ACE wrap to decrease the swelling.     He endorses fevers and chills for one week. He denies headache, chest pain, sore throat, cough, diarrhea, constipation, abdominal pain, nausea, or vomiting. Denies dysuria. He also endorses fatigue (for the past 4 months), runny nose (on and off for 3 months), urinary frequency (having to go more often), urinary incontinence (has the urge, but cannot make it to the bathroom on time because of limited movement). Denies urgency. Denies any changes in his appetite.     He has some neck pain which he states started on 11/11/24. He attributes this to being  in his recliner most of the time, though he has been going back to bed.     In the emergency department, he was febrile (100.4), tachycardic (101), and hypertensive (165/71). He was given 500 mL of LR, acetaminophen, piperacillin-tazobactam, and vancomycin.     Infectious Timeline:  Previous Micro History (Pertinent positive growths):    Wound culture (2/6/24): Many MRSA, Few Enterobacter cloacae complex. Many skin edith. Gram stain with many GPCs in pairs, chains, and clusters, moderate GNB, rare PMNs  MRSA: Resistant to erthyromycin, oxacillin, and clindamycin. Susceptible to gentamicin, TMP-SMX, Vancomycin, Daptomycin, linezolid, rifampin, tetracycline, doxycycline  E. cloacae complex: Resistant to ampicillin, ampicillin/sulbactam. Susceptible to cefepime, ertapenem, meropenem, piperacillin-tazobactam, gentamicin, tobramycin, TMP-SMC, ciprofloxacin    Wound culture (2/27/24): Few Enterobacter cloacae complex, Many skin edith; Many GPC, Few GPC, GNB, no PMNs on gram stain.   E. cloacae complex: Resistant to ampicillin, ampicillin/sulbactam. Susceptible to cefepime, ertapenem, meropenem, piperacillin-tazobactam, gentamicin, tobramycin, TMP-SMC, ciprofloxacin    Wound culture (3/2/24) (Foot joint synovial fluid): Few skin edith, rare GPC, rare PMNs  Wound culture (3/8/24) (Specimen from abscess): Rare skin edith, no organisms seen on gram stain, rare PMNs    Tissue culture (3/14/24) (Tissue culture and stain surgical): One colony Corynebacterium striatum   Resistant to penicillin G and erythromycin     Tissue culture (5/24/24): (Soft tissue specimen): Rare Eneterobacter cloacae complex, alcaligenes faecalis, Corynebacterium striatum  E. Cloacae complex: Resistant to ampicillin, ampicillin/sulbactam. Susceptible to cefepime, ertapenem, meropenem, pip-tazo, gentamicin, tobramycin, TMP-SMX, ciprofloxacin  Alcaligenes faecalis: Susceptible to cefepime, meropenem, pip-tazo, gentamicin, tobramycin, TMP-SMX, ciprofloxacin.      Tissue culture (7/5/24) (Bone and joint specimen): Few corynebacterium striatum, rare staph simulans   C. Striatum: Resistant to penicillin G, ceftriaxone, erythromycin. Non-susceptible to clindamycin. Susceptible to vancomycin.   S. Simulans: Resistant to oxacillin, levofloxacin. Susceptible to erythromycin, clindamycin, TMP-SMC, vancomycin, rifampin, tetracycline, doxycycline.   Recommended to have vancomycin for 6 weeks.     Wound culture (7/18/24) (amputated toe swab): Rare Staphylococcus epidermidis, no organisms seen on gram stain, no PMNs  Tissue culture (7/24/24) (Bone and joint specimen): One colony Staph hominis    Brief Proecedural/Treatment History:   Has had debridement of the right foot diabetic ulcer with wound vac placement in 1/2024. Had a staged incision and drainage to the bone cortex. Was treated with vancomycin and ciprofloxacin previously. Underwent repeat I and D to the bone cortex with antibiotic cement removal in May 2024. Was treated with levofloxacin for 4 weeks. Underwent right foot incision and drainage of the bone cortex in July 2024. Has resection of the lesser tarsal with delayed primary closure of the right foot in July 2024.     Left foot: Has had a left hallux amputation. Had a new dorsal 2nd digit PIPJ ulceration that probed deep to the capsule with minimal purulent drainage. Left foot had 2nd digit proximal interphalangeal joint arthoplasty with debridement of all non-viable and necrotic soft tissue and bone. Primarily closed. Had one colony of Staph hominis and rare staph epi.     Vascular history:   PVR (3/2024):   IMPRESSION   ----------   RIGHT SIDE     Resting right ankle brachial index: 1.00 Partially non-compressible arteries, ARMANDO not accurate.   Right toe brachial index: 0.17     Non-compressible vessels, results called by PVR tracings.   Abnormal toe brachial index at rest is evidence of peripheral artery disease.     Right ankle: Mild disease at rest.     LEFT SIDE      Resting left ankle brachial index: 1.10   Left toe brachial index: 0.46     Normal ankle brachial index at rest in the left leg.   Abnormal toe brachial index at rest is evidence of peripheral artery disease.     Left ankle: Normal at rest.     Past Medical History  As per [facility]    Surgical History  Surgical procedures of his right and loeft foot. 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 Hernia repair. S/p thrombosed vein excision for thrombophlebitis and pain.      Social History  No smoking (quit 20 years ago), no drinking (last 3 years ago), no other substances (last 25 years ago).     Family History  No family history on file.     Allergies  Patient has no known allergies.    Medications  Apixaban 5 mg BID (last taken this morning)  Diazepam 5 mg tablet as needed (takes for sleeping better, anxiety)  Gabapentin 300 mg: takes 1 in the AM, 1 in the afternoon, and 2 at night   Miralax  Atorvastatin 80 mg nightly  Amlodipine 5 mg  Vitamin D3- 400 units  Tamsulosin 0.4 mg at bedtime  B-complex with vitamin C  Maalox  States that he takes ginger root. He does not take any other OTC medications or supplements.     Review of Systems  As per [facility]    Last Recorded Vitals  /65   Pulse 68   Temp 36.1 °C (97 °F)   Resp 16   Wt 90.7 kg (200 lb)   SpO2 99%     Physical Exam  Gen: Awake, alert, no acute distress  EYES: Left prosthetic eye. Right eye with pupil reactive to light  ENT: Moist mucous membranes  CV: Normal S1 and S2, no murmurs, rubs, or gallops. Dopplerable DP and PT pulses in the bilateral lower extremities, stronger on the left than the right.   PULM: CTA bilaterally with no increased work of breathing  ABD: Soft, nondistended, nontender  EXTREM: Left foot: left hallux amputation with dry skin. Appears intact. Noted callus under the first MTP. Right side: lateral shin with a 3 cm wound with a pink dermis without purulence. Soft tissue defect in the middle foot. Appears to  have depth of approximately 1 cm. Yellow pus coming out. Edema in the R lower extremity greater than the L.   NEURO: Sensation absent in the bilateral feet and shins. Intact in the thighs.   SKIN: No skin breakdown noted between the toes. Chronic skin changes of the right lower extremity.     Relevant Results  - Labs:   CBC: WBC 12.4, Hgb 9.0, plt 377   BMP: Na 135, K 4.2, Cl 102, HCO3 21, BUN 17, Cr 0.80, glu 210   LFT: Ca 8.9, tprot 7.5, alb 3.2, alkphos 74, AST 37, ALT 59, tbili 1.3   lactate 2.1 -> 1.2   Coagulation: INR 2.1, PT 24.2, aPTT 33   UA: 1.022 SB, 1+ urobilinogen, 6-10 RBCs  BCx x2 drawn    Imaging  X-ray foot right 3+ views (11/25)  XR foot left 3+ views (11/25)  IMPRESSION:  Findings highly concerning for osteomyelitis involving the right midfoot at its plantar aspect. Associated severe neuropathic arthropathy present. Postsurgical changes in the left without evidence of osteomyelitis radiographically    MRI foot right with and without IV contrast (11/25)   IMPRESSION:  1. Findings consistent with osteomyelitis involving the cuboid and the base of the 4th and 5th metatarsal underlying a large skin and soft tissue defect. Associated osteomyelitis in the base of the 1st through 3rd metatarsals and the cuneiforms may also be present although changes of neuropathic arthropathy is considered most likely at these locations  2. Large plantar skin and soft tissue defect the midfoot with associated cellulitis. No evidence of a soft tissue abscess.  3. 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  4. Severe plantar muscular edema with myositis and diabetic ischemic myopathy in the differential.    Assessment & Plan  Mr. Ace is a 74 year old male with a pertinent history of PAD, history of Charcot deformity, chronic right foot osteomyelitis, paroxysmal Afib, CVA,  diabetes mellitus, hypertension who presented for an infected chronic wound. He meets SIRS criteria with a source of infection, though no signs of end-organ damage currently.     #Chronic infected right leg wound s/p multiple I and D's  #Sepsis secondary to infected wound?  #Osteomyelitis  #Peripheral vascular disease  #Charcot deformity and neuropathy  - Please see brief infectious timeline and procedural history above  - CRP: 16.25, ESR: 96  - SIRS criteria with source of infection, though no signs of end-organ damage currently    Cultures:  BCx (11/25): pending  Wound cultures (11/25): ordered    Antibiotics:  Vancomycin (11/25 - present)  Zosyn (11/25 - present)    Plan:  - Follow-up wound cultures and blood cultures  - Continue with vancomycin and Zosyn (adjust antibiotics based on sensitivities)  - Podiatry consulted: will see in the AM -> consider vascular consult in the AM  - May need wound care depending on podiatry recommendations  - Continue with gabapentin  - ABIs/TBIs ordered  - PT/OT  - Given one time dose of oxycodone for pain. Tylenol 975 mg q8h PRN.     #Hyperbilirubinemia  #Elevated ALT  - Noted to have a total bilirubin of 1.3. Direct bilirubin of 0.5.   - ALT elevated to 59  - Unclear etiology. Previously had a history of chronic hepatitis C that was treated.   - R factor of 2.1, suggesting mixed pattern.   - Elevated PT/INR, normal aPTT. On apixaban at home  Plan:  - Continue to monitor  - Can consider RUQ ultrasound and additional work-up if persistent  - Hold home atorvastatin given slight bump in LFTs    #Diabetes mellitus  - Blood sugar in the 200s on BMP, may be secondary to stress hyperglycemia  Plan:  - POC glucose checks, will add sliding scale insulin if needed    #HTN  - Hold amlodipine in the setting of possible sepsis. Reintroduce as appropriate    #Paroxysmal Afib  - Hold apixaban for possible procedure/surgical intervention. No bridging at this time    #Anemia of chronic disease  -  Baseline of approximately 12-13  - Presented at 9.0. Normocytic with elevated RDW  - Iron 25 (low), TIBC (low), UIBC 165, 13% sat, ferritin of 1,087, suggestive of anemia of chronic disease  - Retic absolute: 0.086, retic %: 2.7%. RPI <2, indicates inadequate response  Plan:   - Treat underlying infection    #Insomnia  #Anxiety  - Hold diazepam. Will give anxiolytic if needed    #BPH  - Continue tamsulosin    #MISC  - Continue B-complex with vitamin C  - Continue vitamin D  - Continue Maalox    F: s/p 500 mL of fluid  E: PRN  N: Consistent carb  A: PIV  Bowel regimen: Miralax    DVT ppx: Holding home apixaban  GI ppx: None    Code Status: Full code (confirmed on admission)  Surrogate Medical Decision-maker: Coco Finley (daughter): [phone]; Yanet Oropeza (son): [phone]    Maksim Young MD

## 2024-11-26 NOTE — CONSULTS
Vancomycin Dosing by Pharmacy- INITIAL    Dustin Ace is a 74 y.o. year old male who Pharmacy has been consulted for vancomycin dosing for osteomyelitis/septic arthritis. Based on the patient's indication and renal status this patient will be dosed based on a goal AUC of 400-600.     Renal function is currently stable.    Visit Vitals  /62 (BP Location: Left arm, Patient Position: Lying)   Pulse 69   Temp 36.1 °C (97 °F)   Resp 16        Lab Results   Component Value Date    CREATININE 0.80 2024    CREATININE 0.88 10/25/2021        Patient weight is as follows:   Vitals:    24 1123   Weight: 90.7 kg (200 lb)       Cultures:  No results found for the encounter in last 14 days.        No intake/output data recorded.  I/O during current shift:  No intake/output data recorded.    Temp (24hrs), Av.1 °C (98.7 °F), Min:36.1 °C (97 °F), Max:38 °C (100.4 °F)         Assessment/Plan     Patient will be given a loading dose of 2000 mg.  Will initiate vancomycin maintenance,  1000 mg every 12 hours.    This dosing regimen is predicted by [employer] to result in the following pharmacokinetic parameters:  Loading dose: N/A  Regimen: 1000 mg IV every 12 hours.  Start time: 15:27 on 2024  Exposure target: AUC24 (range)400-600 mg/L.hr   [id number]: 454 mg/L.hr  AUC24,ss: 487 mg/L.hr  Probability of AUC24 > 400: 70 %  Ctrough,ss: 15.6 mg/L  Probability of Ctrough,ss > 20: 29 %    Follow-up level will be ordered on 24 at 1000 unless clinically indicated sooner.  Will continue to monitor renal function daily while on vancomycin and order serum creatinine at least every 48 hours if not already ordered.  Follow for continued vancomycin needs, clinical response, and signs/symptoms of toxicity.       Tevin Russ, PharmD

## 2024-11-26 NOTE — H&P (VIEW-ONLY)
VASCULAR SURGERY CONSULT NOTE    Assessment/Plan   Dustin Ace is 74 y.o. male with history of emphysema, BPH, CVA hx, PAD, Afib (on Eliquis), HTN, DM who presented to the ED 11/25 with nonhealing wound of his right foot, fevers, chronic osteomyelitis. Right foot has been deemed unsalvageable. He is amenable to a R BKA.   Although febrile on arrival, vitals have stabilized, WBC count normalized on vanc/zosyn.     Plan:  Will plan on BKA on Friday  Continue antibiotics  Okay for diet  Consider bridging Eliquis with heparin gtt given stroke risk    D/w attending, Dr. Kody Brown MD  General Surgery PGY-3  Vascular Surgery [id number]    Subjective   HPI:  Dustin Ace is 74 y.o. male with history of BPH, CVA hx, PAD, Afib (on Eliquis), HTN, DM who presented to the ED 11/25 with nonhealing wound of his right foot, fevers, chronic osteomyelitis. He has had the wounds for about 2-3 years and has been following with wound care / had multiple hospitalizations for this. Podiatry has seen him here this visit and deemed his right foot unsalvageable and recommended a BKA which he is amenable to.   He has had fevers and chills for a little over the past day. He denies any cough or cold symptoms, chest pain, shortness of breath, N/V, abdominal pain.     Vascular History:  Denies any history of stents, angiograms, balloon angioplasty, bypasses in extremities or heart    PMH: BPH, CVA hx, PAD, Afib (on Eliquis), HTN, DM     PSH: Left toe amputation, wound debridements, denies any other surgical history    Home Meds:  No current facility-administered medications on file prior to encounter.     No current outpatient medications on file prior to encounter.    Tamsulosin, Gabapentin, Amlodipine, Eliquis    Allergies:  No Known Allergies    ROS: 12 system negative except HPI    Objective   Vitals:  Heart Rate:  [57-69]   Temperature:  [36.7 °C (98 °F)]   Respirations:  [14-17]   BP: (100-125)/(59-79)   Pulse Ox:  [96  %-99 %]     Exam:  Constitutional: No acute distress, resting comfortably  Neuro:  AOx3, grossly intact, some left eye ptosis at baseline  ENMT: moist mucous membranes  Head/neck: atraumatic  CV: no tachycardia  Pulm: non-labored on room air  GI: soft, non-tender, non-distended  Skin: warm and dry  Musculoskeletal: moving all extremities  Extremities: dry skin of both lower extremities with shallow wound on lateral right shin and deep wound with exposed bone of R plantar area  Palpable R femoral and popliteal, monophasic PT, multiphasic AT, L palpable femoral, PT    Labs:  Results from last 7 days   Lab Units 11/26/24  0457 11/25/24  1229   WBC AUTO x10*3/uL 8.6 12.4*   HEMOGLOBIN g/dL 8.0* 9.0*   PLATELETS AUTO x10*3/uL 341 377      Results from last 7 days   Lab Units 11/26/24  0457 11/25/24  1229   SODIUM mmol/L 134* 135*   POTASSIUM mmol/L 3.8 4.2   CHLORIDE mmol/L 102 102   CO2 mmol/L 24 21   BUN mg/dL 10 17   CREATININE mg/dL 0.67 0.80   GLUCOSE mg/dL 106* 210*   MAGNESIUM mg/dL 1.92  --    PHOSPHORUS mg/dL 3.2 2.5      Results from last 7 days   Lab Units 11/25/24  1229   INR  2.1*   PROTIME seconds 24.2*   APTT seconds 33           Imaging:  Reviewed independently by vascular team:  ARMANDO / Pvr  R DP 0.84, PT 0.89, Toe 0 - mild arterial occlusive disease, monophasic R PT flow  L DP 0.99, PT 1.08, multiphasic flow with no evidence of arterial disease

## 2024-11-26 NOTE — PROGRESS NOTES
Dustin Ace is a 74 y.o. male with a history of BPH, centrilobular emphysema, chronic hepatitis C (treated), CVA, diabetes mellitus, hypertension, CVA, PAD, history of Charcot deformity, chronic right foot osteomyelitis, paroxysmal atrial fibrillation presenting for a chronic R foot wound.       Subjective   Patient seen at bedside this morning. Said he had some initial pain but got a dose of pain meds that helped. He denies fevers/chills, shortness of breath, chest pain, n/v, abdominal pain, diarrhea, constipation, dysuria.       Objective     Last Recorded Vitals  /61   Pulse 57   Temp 36.1 °C (97 °F)   Resp 16   Wt 90.7 kg (200 lb)   SpO2 98%   Intake/Output last 3 Shifts:  No intake or output data in the 24 hours ending 11/26/24 0657    Admission Weight  Weight: 90.7 kg (200 lb) (11/25/24 1123)    Daily Weight  11/25/24 : 90.7 kg (200 lb)    Image Results  ECG 12 lead  Atrial flutter with variable AV block  Abnormal ECG  No previous ECGs available  See ED provider note for full interpretation and clinical correlation  Confirmed by Lo Leong ([id number]) on 11/25/2024 11:23:37 PM  MR foot right w and wo IV contrast  Narrative: Interpreted By:  Amaury Rodriguez,   STUDY:  MRI of the right foot without and with IV contrast;      INDICATION:  Signs/Symptoms:History of chronic osteomyelitis with concern of new  or worsening infection          COMPARISON:  Radiographs from earlier the same day      ACCESSION NUMBER(S):  [id number]      ORDERING CLINICIAN:  ALEXANDRA MCDERMOTT      TECHNIQUE:  Multiplanar multisequence MRI of the right foot was performed  without and with IV contrast. 18 mL of Dotarem were administered  intravenously      FINDINGS:  Soft tissues: There is a large plantar skin and soft tissue defect at  the midfoot underlying the cuboid. There is associated suggestive of  moderate severe cellulitis in the subcutaneous soft tissues of the  midfoot. No discrete fluid collection seen suggest  presence of an  abscess.      Bones:  There is severe marrow edema with loss of the T1 signal  involving the cuboid. There is severe marrow edema with loss of the  T1 signal involving the base of the 4th and 5th metatarsals as well.  There is osseous fragmentation with mild marrow edema involving the  remainder of the midfoot osseous structures articulations to include  the base of the 1st, 2nd and 3rd metatarsals and the cuneiform seen  as well as the navicular. There is destructive arthropathy in the  midfoot with subluxation of the tarsometatarsal joints. There is  increased amount of fluid in the talonavicular joint predominantly.      Muscles:  There is severe are muscle edema in the plantar musculature  with atrophy. Underlying myositis in the setting of the ulcer is  highly differential along with diabetic ischemic myopathy.      Miscellaneous:  There is mild tenosynovitis of the flexor and  peroneal tendons, nonspecific.      Impression: 1. Findings consistent with osteomyelitis involving the cuboid and  the base of the 4th and 5th metatarsal underlying a large skin and  soft tissue defect. Associated osteomyelitis in the base of the 1st  through 3rd metatarsals and the cuneiforms may also be present  although changes of neuropathic arthropathy is considered most likely  at these locations  2. Large plantar skin and soft tissue defect the midfoot with  associated cellulitis. No evidence of a soft tissue abscess.  3. 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  4. Severe plantar muscular edema with myositis and diabetic ischemic  myopathy in the differential.      MACRO:  None      Signed by: Amaury Rodriguez 11/25/2024 7:07 PM  Dictation workstation:   [id number]  XR foot right 3+ views, XR foot left 3+ views  Narrative: Interpreted By:  Amaury Rodriguez,    STUDY:  XR FOOT RIGHT 3+ VIEWS; XR FOOT LEFT 3+ VIEWS; ;  11/25/2024 12:51 pm      INDICATION:  Signs/Symptoms:foot ulcer.          COMPARISON:  None.      ACCESSION NUMBER(S):  [id number]; [id number]      ORDERING CLINICIAN:  ALEXANDRA MCDERMOTT      FINDINGS:  Bilateral feet, three views of each      On the right there is destructive arthropathy in the midfoot  articulations with fragmentation and subluxation. There is a large  skin and soft tissue defect at the plantar aspect of the midfoot  extending to the level of the bone. Underlying osteomyelitis in the  midfoot is highly suspected. There is marked soft tissue edema as  well.      On the left there is partial amputation of the 1st toe. In addition  there is arthroplasty of the 2nd PIP joint. There is no osseous  destruction or erosions seen. There is mild degenerative changes in  the midfoot as well      No fracture or dislocation seen      Impression: Findings highly concerning for osteomyelitis involving the right  midfoot at its plantar aspect. Associated severe neuropathic  arthropathy present. Postsurgical changes in the left without  evidence of osteomyelitis radiographically          MACRO:  None      Signed by: Amaury Rodriguez 11/25/2024 1:03 PM  Dictation workstation:   [id number]      Physical Exam  Constitutional:       General: He is not in acute distress.     Appearance: He is not ill-appearing.   HENT:      Head: Normocephalic and atraumatic.      Mouth/Throat:      Mouth: Mucous membranes are moist.   Eyes:      Extraocular Movements: Extraocular movements intact.      Pupils: Pupils are equal, round, and reactive to light.      Comments: Prosthetic left eye   Cardiovascular:      Rate and Rhythm: Normal rate and regular rhythm.      Heart sounds: Normal heart sounds.      Comments: Dopplerable DP and PT pulses in the bilateral lower extremities, stronger on the left than the right.  Pulmonary:      Effort: Pulmonary effort is normal.       Breath sounds: Normal breath sounds.   Abdominal:      General: Abdomen is flat.      Palpations: Abdomen is soft.   Musculoskeletal:      Right lower leg: Edema present.      Left lower leg: No edema.   Feet:      Comments: Left hallux amputation with dry skin. Appears intact. Noted callus under the first MTP. Right side: lateral shin with a 3 cm wound with a pink dermis without purulence. Soft tissue defect in the middle foot. Appears to have depth of approximately 1 cm. Yellow pus coming out. Edema in the R lower extremity greater than the L.     See media tab.  Neurological:      Mental Status: He is alert and oriented to person, place, and time.         Relevant Results               Assessment/Plan      Assessment & Plan  Osteomyelitis of right foot, unspecified type (Multi)    Dustin Ace is a 74 y.o. male with a history of BPH, centrilobular emphysema, chronic hepatitis C (treated), CVA, diabetes mellitus, hypertension, CVA, PAD, history of Charcot deformity, chronic right foot osteomyelitis, paroxysmal atrial fibrillation presenting for a chronic R foot wound. Patient met SIRS criteria on admission. Started on vanc and zosyn and got 500mL bolus, vitals now improved. MRI confirmed R foot OM.     Updates 11/26:  -Patient seen by podiatry, deemed R foot unsalvageable and recommended BKA. Vascular consulted for BKA  -Continue to hold eliquis pending vascular surgery evaluation  -NPO pending vascular surgery evaluation  -Home atorvastatin resumed    #Chronic infected right leg wound s/p multiple I&D's  #SIRS secondary to infected wound (resolved)  #Osteomyelitis  #Peripheral vascular disease  #Charcot deformity and neuropathy  - CRP: 16.25, ESR: 96  - SIRS criteria with source of infection, though no signs of end-organ damage currently, vitals improved with vanc, zosyn, and IVF bolus  - MRI showed OM in R foot  - Patient seen by podiatry, deemed R foot unsalvageable and recommended BKA  - ARMANDO showed mild  disease in RLE, none in LLE    Cultures:  BCx (11/25): pending  Wound cultures (11/25): ordered     Antibiotics:  Vancomycin (11/25 - present)  Zosyn (11/25 - present)     Plan:  - Follow-up wound cultures and blood cultures  - Continue with vancomycin and Zosyn   - Continue with gabapentin  - PT/OT  - Tylenol 975 mg q8h PRN.   - Vascular surgery consulted for possible BKA  - Hold Eliquis and NPO pending vascular surgery evaluation     #Hyperbilirubinemia  #Elevated ALT  - Noted to have a total bilirubin of 1.3. Direct bilirubin of 0.5.   - ALT elevated to 59  - Unclear etiology. Previously had a history of chronic hepatitis C that was treated.   - Elevated PT/INR, normal aPTT. On apixaban at home  Plan:  - Continue to monitor     #Diabetes mellitus  - Blood sugar in the 200s on BMP, may be secondary to stress hyperglycemia  Plan:  - POC glucose checks, will add sliding scale insulin if needed     #HTN  - Hold amlodipine in the setting of possible sepsis. Reintroduce as appropriate     #Paroxysmal Afib  - Hold apixaban for possible procedure/surgical intervention. No bridging at this time    #HLD  -continue home atorvastatin     #Anemia of chronic disease  - Baseline of approximately 12-13  - Presented at 9.0. Normocytic with elevated RDW  - Iron 25 (low), TIBC (low), UIBC 165, 13% sat, ferritin of 1,087, suggestive of anemia of chronic disease  - Retic absolute: 0.086, retic %: 2.7%. RPI <2, indicates inadequate response  Plan:   - Treat underlying infection     #Insomnia  #Anxiety  - Patient reports taking it approximately once per month  - Hold diazepam. Will give anxiolytic if needed     #BPH  - Continue tamsulosin     #MISC  - Continue B-complex with vitamin C  - Continue vitamin D  - Continue Maalox     F: s/p 500 mL of fluid  E: PRN  N: NPO pending vascular surgery eval  A: PIV  Bowel regimen: Miralax     DVT ppx: Holding home apixaban  GI ppx: None    Code Status: Full code (confirmed on  admission)  Surrogate Medical Decision-maker: Coco Finley (daughter): [phone]; Yanet Oropeza (son): [phone]            Chin Daniel MD  PGY-1

## 2024-11-27 ENCOUNTER — APPOINTMENT (OUTPATIENT)
Dept: RADIOLOGY | Facility: HOSPITAL | Age: 74
End: 2024-11-27
Payer: MEDICARE

## 2024-11-27 ENCOUNTER — ANESTHESIA EVENT (OUTPATIENT)
Dept: OPERATING ROOM | Facility: HOSPITAL | Age: 74
End: 2024-11-27
Payer: MEDICARE

## 2024-11-27 ENCOUNTER — APPOINTMENT (OUTPATIENT)
Dept: CARDIOLOGY | Facility: HOSPITAL | Age: 74
End: 2024-11-27
Payer: MEDICARE

## 2024-11-27 PROBLEM — I63.9 CVA (CEREBRAL VASCULAR ACCIDENT) (MULTI): Status: ACTIVE | Noted: 2024-11-27

## 2024-11-27 PROBLEM — I10 HTN (HYPERTENSION): Status: ACTIVE | Noted: 2024-11-27

## 2024-11-27 PROBLEM — I48.0 PAROXYSMAL ATRIAL FIBRILLATION (MULTI): Status: ACTIVE | Noted: 2024-11-27

## 2024-11-27 PROBLEM — G47.33 OSA (OBSTRUCTIVE SLEEP APNEA): Status: ACTIVE | Noted: 2024-11-27

## 2024-11-27 PROBLEM — J43.9 PULMONARY EMPHYSEMA (MULTI): Status: ACTIVE | Noted: 2024-11-27

## 2024-11-27 LAB
ABO GROUP (TYPE) IN BLOOD: NORMAL
ALBUMIN SERPL BCP-MCNC: 2.8 G/DL (ref 3.4–5)
ANION GAP SERPL CALC-SCNC: 14 MMOL/L (ref 10–20)
ANTIBODY SCREEN: NORMAL
AORTIC VALVE PEAK VELOCITY: 1.13 M/S
APTT PPP: >200 SECONDS (ref 27–38)
AV PEAK GRADIENT: 5 MMHG
AVA (PEAK VEL): 2.56 CM2
BASOPHILS # BLD AUTO: 0.04 X10*3/UL (ref 0–0.1)
BASOPHILS NFR BLD AUTO: 0.5 %
BUN SERPL-MCNC: 12 MG/DL (ref 6–23)
CALCIUM SERPL-MCNC: 8.1 MG/DL (ref 8.6–10.6)
CHLORIDE SERPL-SCNC: 104 MMOL/L (ref 98–107)
CO2 SERPL-SCNC: 24 MMOL/L (ref 21–32)
CREAT SERPL-MCNC: 0.9 MG/DL (ref 0.5–1.3)
EGFRCR SERPLBLD CKD-EPI 2021: 90 ML/MIN/1.73M*2
EJECTION FRACTION APICAL 4 CHAMBER: 59.2
EJECTION FRACTION: 57 %
EOSINOPHIL # BLD AUTO: 0.27 X10*3/UL (ref 0–0.4)
EOSINOPHIL NFR BLD AUTO: 3.1 %
ERYTHROCYTE [DISTWIDTH] IN BLOOD BY AUTOMATED COUNT: 15.9 % (ref 11.5–14.5)
GLUCOSE BLD MANUAL STRIP-MCNC: 127 MG/DL (ref 74–99)
GLUCOSE SERPL-MCNC: 120 MG/DL (ref 74–99)
HCT VFR BLD AUTO: 30 % (ref 41–52)
HGB BLD-MCNC: 9.7 G/DL (ref 13.5–17.5)
IMM GRANULOCYTES # BLD AUTO: 0.03 X10*3/UL (ref 0–0.5)
IMM GRANULOCYTES NFR BLD AUTO: 0.3 % (ref 0–0.9)
INR PPP: 5.8 (ref 0.9–1.1)
LEFT ATRIUM VOLUME AREA LENGTH INDEX BSA: 41.5 ML/M2
LEFT VENTRICLE INTERNAL DIMENSION DIASTOLE: 4.2 CM (ref 3.5–6)
LEFT VENTRICULAR OUTFLOW TRACT DIAMETER: 2 CM
LYMPHOCYTES # BLD AUTO: 2.87 X10*3/UL (ref 0.8–3)
LYMPHOCYTES NFR BLD AUTO: 33.1 %
MAGNESIUM SERPL-MCNC: 2.37 MG/DL (ref 1.6–2.4)
MCH RBC QN AUTO: 27.2 PG (ref 26–34)
MCHC RBC AUTO-ENTMCNC: 32.3 G/DL (ref 32–36)
MCV RBC AUTO: 84 FL (ref 80–100)
MITRAL VALVE E/A RATIO: 4.25
MONOCYTES # BLD AUTO: 0.75 X10*3/UL (ref 0.05–0.8)
MONOCYTES NFR BLD AUTO: 8.7 %
NEUTROPHILS # BLD AUTO: 4.7 X10*3/UL (ref 1.6–5.5)
NEUTROPHILS NFR BLD AUTO: 54.3 %
NRBC BLD-RTO: 0 /100 WBCS (ref 0–0)
PHOSPHATE SERPL-MCNC: 3.9 MG/DL (ref 2.5–4.9)
PLATELET # BLD AUTO: 324 X10*3/UL (ref 150–450)
POTASSIUM SERPL-SCNC: 4.4 MMOL/L (ref 3.5–5.3)
PROTHROMBIN TIME: 66.9 SECONDS (ref 9.8–12.8)
RBC # BLD AUTO: 3.57 X10*6/UL (ref 4.5–5.9)
RH FACTOR (ANTIGEN D): NORMAL
RIGHT VENTRICLE FREE WALL PEAK S': 12.7 CM/S
RIGHT VENTRICLE PEAK SYSTOLIC PRESSURE: 28.2 MMHG
SODIUM SERPL-SCNC: 138 MMOL/L (ref 136–145)
TRICUSPID ANNULAR PLANE SYSTOLIC EXCURSION: 2 CM
UFH PPP CHRO-ACNC: 0.1 IU/ML
UFH PPP CHRO-ACNC: 0.2 IU/ML
UFH PPP CHRO-ACNC: 0.3 IU/ML
UFH PPP CHRO-ACNC: >2 IU/ML
VANCOMYCIN SERPL-MCNC: 10.7 UG/ML (ref 5–20)
WBC # BLD AUTO: 8.7 X10*3/UL (ref 4.4–11.3)

## 2024-11-27 PROCEDURE — 93306 TTE W/DOPPLER COMPLETE: CPT

## 2024-11-27 PROCEDURE — 2500000001 HC RX 250 WO HCPCS SELF ADMINISTERED DRUGS (ALT 637 FOR MEDICARE OP)

## 2024-11-27 PROCEDURE — 99222 1ST HOSP IP/OBS MODERATE 55: CPT | Performed by: INTERNAL MEDICINE

## 2024-11-27 PROCEDURE — 2500000004 HC RX 250 GENERAL PHARMACY W/ HCPCS (ALT 636 FOR OP/ED)

## 2024-11-27 PROCEDURE — 85025 COMPLETE CBC W/AUTO DIFF WBC: CPT

## 2024-11-27 PROCEDURE — 83735 ASSAY OF MAGNESIUM: CPT

## 2024-11-27 PROCEDURE — 85610 PROTHROMBIN TIME: CPT | Performed by: INTERNAL MEDICINE

## 2024-11-27 PROCEDURE — 75574 CT ANGIO HRT W/3D IMAGE: CPT | Performed by: RADIOLOGY

## 2024-11-27 PROCEDURE — 86901 BLOOD TYPING SEROLOGIC RH(D): CPT

## 2024-11-27 PROCEDURE — 99233 SBSQ HOSP IP/OBS HIGH 50: CPT

## 2024-11-27 PROCEDURE — 93306 TTE W/DOPPLER COMPLETE: CPT | Performed by: INTERNAL MEDICINE

## 2024-11-27 PROCEDURE — 1200000002 HC GENERAL ROOM WITH TELEMETRY DAILY

## 2024-11-27 PROCEDURE — 75574 CT ANGIO HRT W/3D IMAGE: CPT

## 2024-11-27 PROCEDURE — 2500000002 HC RX 250 W HCPCS SELF ADMINISTERED DRUGS (ALT 637 FOR MEDICARE OP, ALT 636 FOR OP/ED)

## 2024-11-27 PROCEDURE — 36415 COLL VENOUS BLD VENIPUNCTURE: CPT

## 2024-11-27 PROCEDURE — 99221 1ST HOSP IP/OBS SF/LOW 40: CPT | Performed by: STUDENT IN AN ORGANIZED HEALTH CARE EDUCATION/TRAINING PROGRAM

## 2024-11-27 PROCEDURE — 82947 ASSAY GLUCOSE BLOOD QUANT: CPT

## 2024-11-27 PROCEDURE — 85520 HEPARIN ASSAY: CPT

## 2024-11-27 PROCEDURE — 80202 ASSAY OF VANCOMYCIN: CPT

## 2024-11-27 PROCEDURE — 80069 RENAL FUNCTION PANEL: CPT

## 2024-11-27 PROCEDURE — 86900 BLOOD TYPING SEROLOGIC ABO: CPT

## 2024-11-27 PROCEDURE — 2550000001 HC RX 255 CONTRASTS: Performed by: INTERNAL MEDICINE

## 2024-11-27 RX ORDER — MULTIVITAMIN/IRON/FOLIC ACID 18MG-0.4MG
1 TABLET ORAL DAILY
COMMUNITY
End: 2024-12-07 | Stop reason: HOSPADM

## 2024-11-27 RX ORDER — METOPROLOL TARTRATE 1 MG/ML
5 INJECTION, SOLUTION INTRAVENOUS ONCE AS NEEDED
Status: DISCONTINUED | OUTPATIENT
Start: 2024-11-27 | End: 2024-11-28

## 2024-11-27 RX ORDER — METOPROLOL TARTRATE 50 MG/1
100 TABLET ORAL ONCE
Status: COMPLETED | OUTPATIENT
Start: 2024-11-27 | End: 2024-11-27

## 2024-11-27 RX ORDER — AMLODIPINE BESYLATE 5 MG/1
5 TABLET ORAL
COMMUNITY
Start: 2024-10-17 | End: 2025-04-15

## 2024-11-27 RX ORDER — ATORVASTATIN CALCIUM 80 MG/1
1 TABLET, FILM COATED ORAL DAILY
COMMUNITY

## 2024-11-27 RX ORDER — ASPIRIN 81 MG/1
1 TABLET ORAL DAILY
COMMUNITY
End: 2024-11-27 | Stop reason: WASHOUT

## 2024-11-27 RX ORDER — METOPROLOL TARTRATE 1 MG/ML
5 INJECTION, SOLUTION INTRAVENOUS ONCE
Status: COMPLETED | OUTPATIENT
Start: 2024-11-27 | End: 2024-11-27

## 2024-11-27 RX ORDER — LANOLIN ALCOHOL/MO/W.PET/CERES
1000 CREAM (GRAM) TOPICAL DAILY
COMMUNITY

## 2024-11-27 RX ORDER — CALCIUM CARB/VITAMIN D3/VIT K1 500-500-40
400 TABLET,CHEWABLE ORAL
COMMUNITY
Start: 2024-06-12

## 2024-11-27 RX ORDER — METOPROLOL TARTRATE 50 MG/1
100 TABLET ORAL ONCE AS NEEDED
Status: DISCONTINUED | OUTPATIENT
Start: 2024-11-27 | End: 2024-11-28

## 2024-11-27 RX ORDER — TAMSULOSIN HYDROCHLORIDE 0.4 MG/1
0.4 CAPSULE ORAL DAILY
COMMUNITY
Start: 2024-10-17 | End: 2025-11-05

## 2024-11-27 RX ORDER — DIAZEPAM 5 MG/1
1 TABLET ORAL
Status: ON HOLD | COMMUNITY
Start: 2024-09-12 | End: 2024-12-04

## 2024-11-27 RX ORDER — GABAPENTIN 300 MG/1
CAPSULE ORAL
COMMUNITY
Start: 2024-11-12 | End: 2024-12-07 | Stop reason: HOSPADM

## 2024-11-27 SDOH — SOCIAL STABILITY: SOCIAL INSECURITY
WITHIN THE LAST YEAR, HAVE YOU BEEN RAPED OR FORCED TO HAVE ANY KIND OF SEXUAL ACTIVITY BY YOUR PARTNER OR EX-PARTNER?: NO

## 2024-11-27 SDOH — SOCIAL STABILITY: SOCIAL INSECURITY: WITHIN THE LAST YEAR, HAVE YOU BEEN AFRAID OF YOUR PARTNER OR EX-PARTNER?: NO

## 2024-11-27 SDOH — HEALTH STABILITY: MENTAL HEALTH: HOW OFTEN DO YOU HAVE SIX OR MORE DRINKS ON ONE OCCASION?: NEVER

## 2024-11-27 SDOH — SOCIAL STABILITY: SOCIAL INSECURITY
WITHIN THE LAST YEAR, HAVE YOU BEEN KICKED, HIT, SLAPPED, OR OTHERWISE PHYSICALLY HURT BY YOUR PARTNER OR EX-PARTNER?: NO

## 2024-11-27 SDOH — SOCIAL STABILITY: SOCIAL INSECURITY: WITHIN THE LAST YEAR, HAVE YOU BEEN HUMILIATED OR EMOTIONALLY ABUSED IN OTHER WAYS BY YOUR PARTNER OR EX-PARTNER?: NO

## 2024-11-27 SDOH — HEALTH STABILITY: MENTAL HEALTH: HOW OFTEN DO YOU HAVE A DRINK CONTAINING ALCOHOL?: NEVER

## 2024-11-27 SDOH — HEALTH STABILITY: MENTAL HEALTH: HOW MANY DRINKS CONTAINING ALCOHOL DO YOU HAVE ON A TYPICAL DAY WHEN YOU ARE DRINKING?: PATIENT DOES NOT DRINK

## 2024-11-27 SDOH — ECONOMIC STABILITY: FOOD INSECURITY
WITHIN THE PAST 12 MONTHS, YOU WORRIED THAT YOUR FOOD WOULD RUN OUT BEFORE YOU GOT THE MONEY TO BUY MORE.: SOMETIMES TRUE

## 2024-11-27 SDOH — ECONOMIC STABILITY: TRANSPORTATION INSECURITY: IN THE PAST 12 MONTHS, HAS LACK OF TRANSPORTATION KEPT YOU FROM MEDICAL APPOINTMENTS OR FROM GETTING MEDICATIONS?: YES

## 2024-11-27 SDOH — SOCIAL STABILITY: SOCIAL INSECURITY: ARE THERE ANY APPARENT SIGNS OF INJURIES/BEHAVIORS THAT COULD BE RELATED TO ABUSE/NEGLECT?: NO

## 2024-11-27 SDOH — SOCIAL STABILITY: SOCIAL INSECURITY: ARE YOU OR HAVE YOU BEEN THREATENED OR ABUSED PHYSICALLY, EMOTIONALLY, OR SEXUALLY BY ANYONE?: NO

## 2024-11-27 SDOH — ECONOMIC STABILITY: FOOD INSECURITY: WITHIN THE PAST 12 MONTHS, THE FOOD YOU BOUGHT JUST DIDN'T LAST AND YOU DIDN'T HAVE MONEY TO GET MORE.: SOMETIMES TRUE

## 2024-11-27 SDOH — SOCIAL STABILITY: SOCIAL INSECURITY: DO YOU FEEL UNSAFE GOING BACK TO THE PLACE WHERE YOU ARE LIVING?: NO

## 2024-11-27 SDOH — ECONOMIC STABILITY: INCOME INSECURITY: IN THE PAST 12 MONTHS HAS THE ELECTRIC, GAS, OIL, OR WATER COMPANY THREATENED TO SHUT OFF SERVICES IN YOUR HOME?: NO

## 2024-11-27 SDOH — SOCIAL STABILITY: SOCIAL INSECURITY: HAVE YOU HAD THOUGHTS OF HARMING ANYONE ELSE?: NO

## 2024-11-27 SDOH — SOCIAL STABILITY: SOCIAL INSECURITY: DOES ANYONE TRY TO KEEP YOU FROM HAVING/CONTACTING OTHER FRIENDS OR DOING THINGS OUTSIDE YOUR HOME?: NO

## 2024-11-27 SDOH — ECONOMIC STABILITY: HOUSING INSECURITY: IN THE LAST 12 MONTHS, WAS THERE A TIME WHEN YOU WERE NOT ABLE TO PAY THE MORTGAGE OR RENT ON TIME?: NO

## 2024-11-27 SDOH — ECONOMIC STABILITY: HOUSING INSECURITY: IN THE PAST 12 MONTHS, HOW MANY TIMES HAVE YOU MOVED WHERE YOU WERE LIVING?: 0

## 2024-11-27 SDOH — SOCIAL STABILITY: SOCIAL INSECURITY: DO YOU FEEL ANYONE HAS EXPLOITED OR TAKEN ADVANTAGE OF YOU FINANCIALLY OR OF YOUR PERSONAL PROPERTY?: NO

## 2024-11-27 SDOH — SOCIAL STABILITY: SOCIAL INSECURITY: HAS ANYONE EVER THREATENED TO HURT YOUR FAMILY OR YOUR PETS?: NO

## 2024-11-27 SDOH — SOCIAL STABILITY: SOCIAL INSECURITY: WERE YOU ABLE TO COMPLETE ALL THE BEHAVIORAL HEALTH SCREENINGS?: YES

## 2024-11-27 ASSESSMENT — ACTIVITIES OF DAILY LIVING (ADL)
HEARING - RIGHT EAR: FUNCTIONAL
TOILETING: INDEPENDENT
WALKS IN HOME: NEEDS ASSISTANCE
GROOMING: INDEPENDENT
BATHING: INDEPENDENT
JUDGMENT_ADEQUATE_SAFELY_COMPLETE_DAILY_ACTIVITIES: YES
PATIENT'S MEMORY ADEQUATE TO SAFELY COMPLETE DAILY ACTIVITIES?: YES
FEEDING YOURSELF: INDEPENDENT
DRESSING YOURSELF: INDEPENDENT
LACK_OF_TRANSPORTATION: YES
HEARING - LEFT EAR: FUNCTIONAL
ADEQUATE_TO_COMPLETE_ADL: YES

## 2024-11-27 ASSESSMENT — COGNITIVE AND FUNCTIONAL STATUS - GENERAL
PATIENT BASELINE BEDBOUND: NO
PERSONAL GROOMING: A LITTLE
MOVING TO AND FROM BED TO CHAIR: A LITTLE
TOILETING: A LITTLE
MOBILITY SCORE: 20
CLIMB 3 TO 5 STEPS WITH RAILING: A LITTLE
WALKING IN HOSPITAL ROOM: A LITTLE
HELP NEEDED FOR BATHING: A LITTLE
STANDING UP FROM CHAIR USING ARMS: A LITTLE
DAILY ACTIVITIY SCORE: 21

## 2024-11-27 ASSESSMENT — PATIENT HEALTH QUESTIONNAIRE - PHQ9
SUM OF ALL RESPONSES TO PHQ9 QUESTIONS 1 & 2: 2
2. FEELING DOWN, DEPRESSED OR HOPELESS: SEVERAL DAYS
1. LITTLE INTEREST OR PLEASURE IN DOING THINGS: SEVERAL DAYS

## 2024-11-27 ASSESSMENT — PAIN SCALES - GENERAL
PAINLEVEL_OUTOF10: 0 - NO PAIN
PAINLEVEL_OUTOF10: 0 - NO PAIN

## 2024-11-27 ASSESSMENT — LIFESTYLE VARIABLES
SKIP TO QUESTIONS 9-10: 1
AUDIT-C TOTAL SCORE: 0

## 2024-11-27 NOTE — PROGRESS NOTES
Physical Therapy                 Therapy Communication Note    Patient Name: Dustin Ace  MRN: [id number]  Department:   Room: [facility]  Today's Date: 11/27/2024     Discipline: Physical Therapy    Missed Visit Reason: Missed Visit Reason:  (Per chart review, pt to have BKA on Fri; per secure chat with team, ok to defer PT until post op; will reattempt post op as medically appropriate.)    Missed Time: Attempt 0800        11/27/24 at 11:16 AM - Myriam Conn, PT

## 2024-11-27 NOTE — ANESTHESIA PREPROCEDURE EVALUATION
Patient: Dustin Ace    Procedure Information       Date/Time: 11/29/24 0925    Procedure: Leg Amputation Below Knee (Right)    Location: [facility] OR 16 / [facility] OR    Surgeons: Sis Gates MD          Vitals:    11/27/24 1241   BP: 120/70   Pulse: 70   Resp: 18   Temp:    SpO2: 97%             Current Facility-Administered Medications:     acetaminophen (Tylenol) tablet 975 mg, 975 mg, oral, q8h, Vinh Roy MD, 975 mg at 11/27/24 0658    alum-mag hydroxide-simeth (Mylanta) 200-200-20 mg/5 mL oral suspension 30 mL, 30 mL, oral, q6h PRN, Maksim Young MD    atorvastatin (Lipitor) tablet 80 mg, 80 mg, oral, Nightly, Vinh Roy MD, 80 mg at 11/26/24 2251    B complex-vitamin C tablet 1 tablet, 1 tablet, oral, Daily, Maksim Young MD, 1 tablet at 11/27/24 0843    cholecalciferol (Vitamin D-3) tablet 10 mcg, 400 Units, oral, Daily, Maksim Young MD, 10 mcg at 11/27/24 0843    gabapentin (Neurontin) capsule 300 mg, 300 mg, oral, TID, Maksim Young MD, 300 mg at 11/27/24 0843    heparin 25,000 Units in dextrose 5% 250 mL (100 Units/mL) infusion (premix), 0-4,000 Units/hr, intravenous, Continuous, Laura Espinal MD MPH, Last Rate: 10 mL/hr at 11/26/24 2243, 1,000 Units/hr at 11/26/24 2243    metoprolol tartrate (Lopressor) injection 5 mg, 5 mg, intravenous, Once, Chin Daniel MD    metoprolol tartrate (Lopressor) injection 5 mg, 5 mg, intravenous, Once PRN, Chin Daniel MD    metoprolol tartrate (Lopressor) injection 5 mg, 5 mg, intravenous, Once PRN, Chin Daniel MD    metoprolol tartrate (Lopressor) injection 5 mg, 5 mg, intravenous, Once PRN, Chin Daniel MD    metoprolol tartrate (Lopressor) injection 5 mg, 5 mg, intravenous, Once PRN, Chin Daniel MD    metoprolol tartrate (Lopressor) tablet 100 mg, 100 mg, oral, Once PRN, Chin Daniel MD    oxyCODONE (Roxicodone) immediate release tablet 5 mg, 5 mg, oral, q4h PRN, Vinh Roy MD    piperacillin-tazobactam (Zosyn) 4.5  g in dextrose (iso)  mL, 4.5 g, intravenous, q6h, Maksim Young MD, Last Rate: 0 mL/hr at 11/27/24 0614, 4.5 g at 11/27/24 1248    polyethylene glycol (Glycolax, Miralax) packet 17 g, 17 g, oral, Daily, Vinh Roy MD    tamsulosin (Flomax) 24 hr capsule 0.4 mg, 0.4 mg, oral, Nightly, Maksim Young MD, 0.4 mg at 11/26/24 2308    vancomycin (Vancocin) 1,000 mg in dextrose 5%  mL, 1,000 mg, intravenous, q12h, Maksim Young MD, Stopped at 11/27/24 0500    vancomycin (Vancocin) pharmacy to dose - pharmacy monitoring, , miscellaneous, Daily PRN, Maksim Young MD  No current outpatient medications on file.  Prior to Admission medications    Not on File     No Known Allergies  Social History     Tobacco Use    Smoking status: Not on file    Smokeless tobacco: Not on file   Substance Use Topics    Alcohol use: Not on file         Chemistry    Lab Results   Component Value Date/Time     11/27/2024 0656    K 4.4 11/27/2024 0656     11/27/2024 0656    CO2 24 11/27/2024 0656    BUN 12 11/27/2024 0656    CREATININE 0.90 11/27/2024 0656    Lab Results   Component Value Date/Time    CALCIUM 8.1 (L) 11/27/2024 0656    ALKPHOS 74 11/25/2024 1229    AST 37 11/25/2024 1229    ALT 59 (H) 11/25/2024 1229    BILITOT 1.3 (H) 11/25/2024 1229          Lab Results   Component Value Date/Time    WBC 8.7 11/27/2024 0656    HGB 9.7 (L) 11/27/2024 0656    HCT 30.0 (L) 11/27/2024 0656     11/27/2024 0656     Lab Results   Component Value Date/Time    PROTIME 24.2 (H) 11/25/2024 1229    INR 2.1 (H) 11/25/2024 1229     Encounter Date: 11/25/24   ECG 12 lead   Result Value    Ventricular Rate 88    Atrial Rate 293    QRS Duration 78    QT Interval 368    QTC Calculation(Bazett) 445    P Axis 84    R Axis 89    T Axis 35    QRS Count 14    Q Onset 219    P Onset 130    P Offset 169    T Offset 403    QTC Fredericia 418    Narrative    Atrial flutter with variable AV block  Abnormal ECG  No previous ECGs  available  See ED provider note for full interpretation and clinical correlation  Confirmed by Lo Leong ([id number]) on 11/25/2024 11:23:37 PM     No results found for this or any previous visit from the past 1095 days.      Relevant Problems   Cardiac   (+) HTN (hypertension)   (+) Paroxysmal atrial fibrillation (Multi)      Pulmonary   (+) JOSE (obstructive sleep apnea)   (+) Pulmonary emphysema (Multi)      Neuro   (+) CVA (cerebral vascular accident) (Multi)      ID   (+) Osteomyelitis of right foot, unspecified type (Multi)       Clinical information reviewed:    Allergies                NPO Detail:  No data recorded     Physical Exam    Airway  Mallampati: II  TM distance: >3 FB  Neck ROM: full     Cardiovascular - normal exam     Dental - normal exam     Pulmonary - normal exam     Abdominal - normal exam             Anesthesia Plan    History of general anesthesia?: yes  History of complications of general anesthesia?: no    ASA 3     general   (Postop LE blocks for pain control consented.)  intravenous induction   Anesthetic plan and risks discussed with patient.  Use of blood products discussed with patient who consented to blood products.    Plan discussed with resident.

## 2024-11-27 NOTE — PROGRESS NOTES
Pharmacy Medication History Review    Dustin Ace is a 74 y.o. male admitted for Osteomyelitis of right foot, unspecified type (Multi). Pharmacy reviewed the patient's [id number] medications and allergies for accuracy.    The list below reflects the updated PTA list.   Prior to Admission Medications   Prescriptions Last Dose Informant   amLODIPine (Norvasc) 5 mg tablet Past Week Self   Sig: Take 1 tablet (5 mg) by mouth once daily.   apixaban (Eliquis) 5 mg tablet Past Week Self   Sig: Take 1 tablet (5 mg) by mouth twice a day.   atorvastatin (Lipitor) 80 mg tablet Past Week Self   Sig: Take 1 tablet (80 mg) by mouth once daily.   b complex 0.4 mg tablet Past Week Self   Sig: Take 1 tablet by mouth once daily.   cholecalciferol (Vitamin D-3) 400 unit capsule Past Week Self   Sig: Take 1 capsule (10 mcg) by mouth once daily.   cyanocobalamin (Vitamin B-12) 1,000 mcg tablet Past Week Self   Sig: Take 1 tablet (1,000 mcg) by mouth once daily.   diazePAM (Valium) 5 mg tablet Past Week Self   Sig: Take 1 tablet (5 mg) by mouth early in the morning..   gabapentin (Neurontin) 300 mg capsule Past Week Self   Sig: Take once capsule in the morning, one capsule in the afternoon, and two capsules at night.   tamsulosin (Flomax) 0.4 mg 24 hr capsule Past Week Self   Sig: Take 1 capsule (0.4 mg) by mouth once daily.      Facility-Administered Medications: None        The list below reflects the updated allergy list. Please review each documented allergy for additional clarification and justification.  Allergies  Reviewed by Jo Collazo RN on 11/25/2024   No Known Allergies         Patient accepts M2B at discharge. Pharmacy has been updated to [facility].    Sources used to complete the med history include:    [facility]  Pharmacy dispense history  Patient interview Moderate historian  Chart Review  Care Everywhere     Below are additional concerns with the patient's PTA list.  Patient is a moderate  historian, he recalls some medications taking from memory. When prompted with drug names/ indication for remainder pt is able to identify if taking or not.  Pt reports that he is no longer taking Aspirin.         Medications ADDED:  All PTA list  Medications CHANGED:  none  Medications REMOVED:   none    Daniel Cheng PharmD  Transitions of Care Pharmacist  [facility]  Please reach out via Secure Chat for questions, or if no response call [employer] or [employer]

## 2024-11-27 NOTE — PROGRESS NOTES
Dustin Johnson is a 74 y.o. male with a history of BPH, centrilobular emphysema, chronic hepatitis C (treated), CVA, diabetes mellitus, hypertension, PAD, history of Charcot deformity, chronic right foot osteomyelitis, paroxysmal atrial fibrillation presenting for a chronic R foot wound.       Subjective   NAEO. Patient seen at bedside this morning. Not having any pain. Did not sleep well but no other complaints. He denies fevers/chills, shortness of breath, chest pain, n/v, abdominal pain, diarrhea, constipation, dysuria.       Objective     Last Recorded Vitals  /77 (BP Location: Right arm, Patient Position: Lying)   Pulse 69   Temp 37 °C (98.6 °F) (Oral)   Resp 17   Wt 90.7 kg (200 lb)   SpO2 100%   Intake/Output last 3 Shifts:    Intake/Output Summary (Last 24 hours) at 11/27/2024 0653  Last data filed at 11/26/2024 0954  Gross per 24 hour   Intake --   Output 300 ml   Net -300 ml       Admission Weight  Weight: 90.7 kg (200 lb) (11/25/24 1123)    Daily Weight  11/25/24 : 90.7 kg (200 lb)    Image Results  Vascular US lower extremity arterial duplex bilateral with ARMANDO              [place] [address]    Tel [phone] and Fax [phone]       Vascular Lab Report  [facility] US LOWER EXTREMITY ARTERIAL DUPLEX BILATERAL WITH ARMANDO       Patient Name:      DUSTIN JOHNSON         Reading Physician:  [id number] Brinda Villalobos MD  Study Date:        11/26/2024           Ordering Physician: [id number] RODRIGUES  MRN/PID:           [id number]             Technologist:       Rita Berry JUDY  Accession#:        [id number]         Technologist 2:  Date of Birth/Age: 1950 / 74 years Encounter#:         [id number]  Gender:            M  Admission Status:  Emergency            Location Performed: [facility]       Diagnosis/ICD:  Peripheral vascular disease, unspecified-I73.9  CPT Codes:     63861 Peripheral artery Lower arterial Duplex complete       Patient History Left 1st toe amputation . and PVD.       CONCLUSIONS:  Right Lower PVR: Evidence of mild arterial occlusive disease in the right lower extremity at rest. Decreased digital perfusion noted. Monophasic flow is noted in the right posterior tibial artery. Multiphasic flow is noted in the right common femoral artery.  Left Lower PVR: No evidence of arterial occlusive disease in the left lower extremity at rest. Multiphasic flow is noted in the left common femoral artery, left posterior tibial artery and left dorsalis pedis artery.     Additional Findings:  No BP right arm.       Imaging & Doppler Findings:     RIGHT Lower PVR                Pressures Ratios  Right Posterior Tibial (Ankle) 96 mmHg   0.89  Right Dorsalis Pedis (Ankle)   91 mmHg   0.84  Right Digit (Great Toe)        0 mmHg    0.00          LEFT Lower PVR                Pressures Ratios  Left Posterior Tibial (Ankle) 117 mmHg  1.08  Left Dorsalis Pedis (Ankle)   107 mmHg  0.99                           Left  Brachial Pressure 108 mmHg          [id number] Brinda Villalobos MD  Electronically signed by [id number] Brinda Villalobos MD on 11/26/2024 at 6:36:08 PM       ** Final **      Physical Exam  Constitutional:       General: He is not in acute distress.     Appearance: He is not ill-appearing.   HENT:      Head: Normocephalic and atraumatic.      Mouth/Throat:      Mouth: Mucous membranes are moist.   Eyes:      Extraocular Movements: Extraocular movements intact.      Pupils: Pupils are equal, round, and reactive to light.      Comments: Prosthetic left eye   Cardiovascular:      Rate and Rhythm: Normal rate and regular rhythm.      Heart sounds: Normal heart sounds.      Comments: Dopplerable DP and PT pulses in the bilateral lower extremities, stronger on the left than the right.  Pulmonary:      Effort: Pulmonary effort is  normal.      Breath sounds: Normal breath sounds.   Abdominal:      General: Abdomen is flat.      Palpations: Abdomen is soft.   Musculoskeletal:      Right lower leg: Edema present.      Left lower leg: No edema.   Feet:      Comments: Left hallux amputation with dry skin. Appears intact. Noted callus under the first MTP. Right side: lateral shin with a 3 cm wound with a pink dermis without purulence. Soft tissue defect in the middle foot. Appears to have depth of approximately 1 cm. Yellow pus coming out. Edema in the R lower extremity greater than the L.     See media tab.  Neurological:      Mental Status: He is alert and oriented to person, place, and time.         Relevant Results               Assessment/Plan      Assessment & Plan  Osteomyelitis of right foot, unspecified type (Multi)    Dustin Ace is a 74 y.o. male with a history of BPH, centrilobular emphysema, chronic hepatitis C (treated), CVA, diabetes mellitus, hypertension, PAD, history of Charcot deformity, chronic right foot osteomyelitis, paroxysmal atrial fibrillation presenting for a chronic R foot wound. Patient met SIRS criteria on admission. Started on vanc and zosyn and got 500mL bolus, vitals now improved. MRI confirmed R foot OM.     Updates 11/27:  -Vascular surgery planning right BKA on Friday 11/29  -Patient started on heparin drip to bridge to surgery while his home eliquis is being held  -Cardiology consulted for risk stratification per vascular surgery request, TTE and EKG ordered    Pre-Op Risk Assessment:  Labs :   - CBC, RFP, EKG, CXR, Coags, T&S  - Mild leukocytosis upon presentation, now resolved. Anemia of chronic disease with hgb stable between 8 to 10. ALT mildly elevated at 59 and t.bili 1.3 on 11/25. No renal dysfunction. On apixaban at home with coags on 11/25 showing INR 2.1 and PT 24.2.   - Met SIRS criteria upon admission but started on broad spectrum antibiotics and he quickly improved. Vital signs stable while  inpatient. On amlodipine at home, currently held given he is normotensive.  General Risk Assessment:   - History of stroke, PAD, paroxysmal afib, centrilobular emphysema, JOSE  - No history of CAD, CHF  - Patient has lifestyle controlled T2DM, not on Insulin at home, currently not requiring any while inpatient  - Apixaban currently held with low intensity heparin drip pending surgery  Cardiac Risk Assessment:   -Cardiology consulted for risk stratification per vascular surgery    #Chronic infected right leg wound s/p multiple I&D's  #SIRS secondary to infected wound (resolved)  #Osteomyelitis  #Peripheral vascular disease  #Charcot deformity and neuropathy  - CRP: 16.25, ESR: 96  - SIRS criteria with source of infection, though no signs of end-organ damage currently, vitals improved with vanc, zosyn, and IVF bolus  - MRI showed OM in R foot  - Patient seen by podiatry, deemed R foot unsalvageable and recommended BKA  - ARMANDO showed mild disease in RLE, none in LLE    Cultures:  BCx (11/25): NGTD  Wound cultures (11/25): NGTD     Antibiotics:  Vancomycin (11/25 - present)  Zosyn (11/25 - present)     Plan:  - Follow-up wound cultures and blood cultures  - Continue with vancomycin and Zosyn   - Continue with gabapentin  - PT/OT  - Tylenol 975 mg q8h PRN.   - Vascular surgery planning right BKA on Friday 11/29  - Patient started on heparin drip to bridge to surgery while his home eliquis is being held  - Cardiology consulted for risk stratification per vascular surgery request, TTE and EKG ordered     #Hyperbilirubinemia  #Elevated ALT  - Noted to have a total bilirubin of 1.3. Direct bilirubin of 0.5.   - ALT elevated to 59  - Unclear etiology. Previously had a history of chronic hepatitis C that was treated.   - Elevated PT/INR, normal aPTT. On apixaban at home  Plan:  - Continue to monitor     #Diabetes mellitus  - Blood sugar in the 200s on BMP, may be secondary to stress hyperglycemia  Plan:  - POC glucose checks,  will add sliding scale insulin if needed     #HTN  - Hold amlodipine in the setting of possible sepsis. Reintroduce as appropriate     #Paroxysmal Afib  - Hold apixaban for possible procedure/surgical intervention.   -Low intensity therapeutic heparin drip    #HLD  -continue home atorvastatin     #Anemia of chronic disease  - Baseline of approximately 12-13  - Presented at 9.0. Normocytic with elevated RDW  - Iron 25 (low), TIBC (low), UIBC 165, 13% sat, ferritin of 1,087, suggestive of anemia of chronic disease  - Retic absolute: 0.086, retic %: 2.7%. RPI <2, indicates inadequate response  Plan:   - Treat underlying infection     #Insomnia  #Anxiety  - Patient reports taking it approximately once per month  - Hold diazepam. Will give anxiolytic if needed     #BPH  - Continue tamsulosin     #MISC  - Continue B-complex with vitamin C  - Continue vitamin D  - Continue Maalox     F: PRN  E: PRN  N: regular diet  A: PIV  Bowel regimen: Miralax     DVT ppx: Holding home apixaban, heparin drip  GI ppx: None    Code Status: Full code (confirmed on admission)  Surrogate Medical Decision-maker: Coco Finley (daughter): [phone]; Yanet Oropeza (son): [phone]            Chin Daniel MD  PGY-1

## 2024-11-27 NOTE — PROGRESS NOTES
Communication Note    Patient Name: Dustin Ace  MRN: [id number]  Today's Date: 11/27/2024   [facility]    Discipline: Occupational Therapy      Missed Visit Reason:  (Per chart review, no pre-op OT needs with plan for OR for BKA on Friday. Defer OT.)      11/27/24 at 9:16 AM   Hanny Patel OT   Rehab Office: [phone]

## 2024-11-27 NOTE — CONSULTS
Inpatient consult to Cardiology  Consult performed by: Neelima Izaguirre MD  Consult ordered by: Sulaiman Waddell MD        History Of Present Illness:    Dustin Ace is a 74 y.o. male with history of emphysema, BPH, CVA hx, PAD, Afib (on Eliquis), HTN, DM who presented to the ED 11/25 with nonhealing wound of his right foot, fevers, chronic osteomyelitis. Right foot has been deemed unsalvageable. He is amenable to a R BKA. Cardiology was consulted for pre-op risk stratification.    Cardiac hx:  The patient has hx of Afib on eliquis along with HTN, PAD and DM. EKG on this admission shows Atypical Aflutter. The patient denies chest pain, palpitations or lightheadedness but reports shortness of breath with walking for more than 3 mins.     Cardiac investigations:  TTE:   1. The patient is in atrial flutter which may influence the estimate of left ventricular function and transvalvular flows.   2. Left ventricular ejection fraction is normal, calculated by Appiah's biplane at 57%.   3. Left ventricular diastolic filling cannot be determined, due to atrial fibrillation/flutter.   4. There is normal right ventricular global systolic function.   5. The left atrium is mildly dilated.   6. There is moderate aortic valve cusp calcification with reduced excursion (non-coronary cusp).   7. There is no evidence of a patent foramen ovale.     EKG: Atypical flutter with variable block with HR 80s.         Last Recorded Vitals:  Vitals:    11/27/24 0200 11/27/24 0400 11/27/24 0600 11/27/24 0758   BP: 107/66 118/61 124/77 116/71   BP Location: Right arm Right arm Right arm    Patient Position: Lying Lying Lying    Pulse: 69 60 69 67   Resp: 16 16 17 18   Temp:   37 °C (98.6 °F) 36.2 °C (97.2 °F)   TempSrc:   Oral Temporal   SpO2: 99% 99% 100% 100%   Weight:       Height:           Last Labs:  CBC - 11/27/2024:  6:56 AM  8.7 9.7 324    30.0      CMP - 11/27/2024:  6:56 AM  8.1 7.5 37 --- 1.3   3.9 2.8 59 74      PTT - 11/26/2024:   "7:36 PM  2.1   24.2 33     Hemoglobin A1C   Date/Time Value Ref Range Status   11/25/2024 12:29 PM 5.6 See comment % Final   04/19/2024 12:00 AM 5.2 4.0 - 6.0 % Final   03/06/2024 11:12 AM 5.1 4.3 - 5.6 % Final     Comment:     American Diabetes Association guidelines indicate that patients with HgbA1c in the range 5.7-6.4% are at increased risk for development of diabetes, and intervention by lifestyle modification may be beneficial. HgbA1c greater or equal to 6.5% is considered diagnostic of diabetes.     LDL Calculated   Date/Time Value Ref Range Status   11/26/2024 04:57 AM 34 <=99 mg/dL Final     Comment:                                 Near   Borderline      AGE      Desirable  Optimal    High     High     Very High     0-19 Y     0 - 109     ---    110-129   >/= 130     ----    20-24 Y     0 - 119     ---    120-159   >/= 160     ----      >24 Y     0 -  99   100-129  130-159   160-189     >/=190       VLDL   Date/Time Value Ref Range Status   11/26/2024 04:57 AM 14 0 - 40 mg/dL Final      Last I/O:  I/O last 3 completed shifts:  In: - (0 mL/kg)   Out: 300 (3.3 mL/kg) [Urine:300 (0.1 mL/kg/hr)]  Weight: 90.7 kg     Past Cardiology Tests (Last 3 Years):  EKG:  ECG 12 lead 11/25/2024    Echo:  No results found for this or any previous visit from the past 1095 days.    Ejection Fractions:  No results found for: \"EF\"  Cath:  No results found for this or any previous visit from the past 1095 days.    Stress Test:  No results found for this or any previous visit from the past 1095 days.    Cardiac Imaging:  No results found for this or any previous visit from the past 1095 days.      Past Medical History:  He has no past medical history on file.    Past Surgical History:  He has no past surgical history on file.      Social History:  He has no history on file for tobacco use, alcohol use, and drug use.    Family History:  No family history on file.     Allergies:  Patient has no known allergies.    Inpatient " Medications:  Scheduled medications   Medication Dose Route Frequency    acetaminophen  975 mg oral q8h    atorvastatin  80 mg oral Nightly    B complex-vitamin C  1 tablet oral Daily    cholecalciferol  400 Units oral Daily    gabapentin  300 mg oral TID    piperacillin-tazobactam  4.5 g intravenous q6h    polyethylene glycol  17 g oral Daily    tamsulosin  0.4 mg oral Nightly    vancomycin  1,000 mg intravenous q12h     PRN medications   Medication    alum-mag hydroxide-simeth    oxyCODONE    vancomycin     Continuous Medications   Medication Dose Last Rate    heparin  0-4,000 Units/hr 1,000 Units/hr (11/26/24 6513)     Outpatient Medications:  No current outpatient medications    Physical Exam:  General Appearance:    Alert, cooperative, no distress, appears stated age  Lungs:   Respirations unlabored  Heart:    Regular rate and rhythm, S1 and S2 normal, no murmur, rub  or gallop.         Assessment/Plan   Dustin Ace is a 74 y.o. male with history of emphysema, BPH, CVA hx, PAD, Afib (on Eliquis), HTN, DM who presented to the ED 11/25 with nonhealing wound of his right foot, fevers, chronic osteomyelitis. Right foot has been deemed unsalvageable. He is amenable to a R BKA. Cardiology was consulted for pre-op risk stratification.    The revised cardiovascular risk index is =2 and is associated with a >10.1 percent risk of major cardiovascular events. He does not have the ability to perform > 4 MET levels of activity and has shortness of breath on exertion.   -CTA coronary showed 50-70% in multiple coronary arteries with CACS 2100, which placed him as a moderate risk for surgery, no further cardiac optimization is currently indicated.    Peripheral IV 11/26/24 20 G Right Antecubital (Active)   Site Assessment Clean;Dry;Intact 11/26/24 0326   Dressing Type Transparent 11/26/24 0326   Line Status Flushed 11/26/24 0326   Dressing Status Clean;Dry;Occlusive 11/26/24 0326   Number of days: 1       Peripheral IV  11/26/24 20 G Left;Dorsal Hand (Active)   Site Assessment Clean;Dry;Intact 11/26/24 2200   Dressing Type Transparent 11/26/24 2200   Line Status Flushed;Infusing 11/26/24 2200   Dressing Status Clean;Dry;Occlusive 11/26/24 2200   Number of days: 1       Peripheral IV 11/26/24 20 G Left;Proximal;Ventral Forearm (Active)   Site Assessment Dry;Intact;Clean 11/26/24 2200   Dressing Type Transparent 11/26/24 2200   Line Status Flushed 11/26/24 2200   Dressing Status Clean;Dry;Occlusive 11/26/24 2200   Number of days: 1       Code Status:  Full Code    I spent 30 minutes in the professional and overall care of this patient.        Neelima Izaguirre MD  Cardiology Fellow PGY5    Thank you for involving us in this patient care. Recommendations not final until signed by attending.     General Cardiology Consult Pager: [phone] (weekday 7AM-6PM and weekend 7AM-2PM) and other: [phone]  EP Consult Pager: [phone] (weekday 7AM-6PM and weekend 7AM-2PM) and other: [phone]  CICU Fellow Pager: [phone] anytime  EP Device Nurse Pager: [phone] (weekday 7AM-4PM)  Advanced Heart Failure Consult Pager: [phone] anytime

## 2024-11-27 NOTE — NURSING NOTE
22:43-  Heparin drip started @10ml/hr. Pt received zosyn 4.5 ivpb as ordered. Pt denies any c/o pain.  In bed resting no distress noted. Vss.

## 2024-11-27 NOTE — CARE PLAN
The patient's goals for the shift include      The clinical goals for the shift include        Problem: Fall/Injury  Goal: Not fall by end of shift  Outcome: Progressing     Problem: Pain  Goal: Takes deep breaths with improved pain control throughout the shift  Outcome: Progressing

## 2024-11-27 NOTE — PROGRESS NOTES
VASCULAR SURGERY PROGRESS NOTE  Assessment/Plan   Dustin Ace is 74 y.o. male with history of emphysema, BPH, CVA hx, PAD, Afib (on Eliquis), HTN, DM who presented to the ED 11/25 with nonhealing wound of his right foot, fevers, chronic osteomyelitis. Right foot has been deemed unsalvageable. He is amenable to a R BKA.   Although febrile on arrival, vitals have stabilized, WBC count normalized on vanc/zosyn.     Plan:  R BKA on Friday  Continue antibiotics  Please make NPO at midnight the night before surgery  Ensure up to date Type and Screen available  Okay to bridge Eliquis with heparin gtt  Please consult cardiology for risk stratification    D/w attending, Dr. Kody Brown MD  General Surgery PGY-3  Vascular Surgery [id number]    Subjective   No issues. Pain has not worsened in his foot. No fevers or chills overnight. He is happy he ate dinner.     Objective   Vitals:  Heart Rate:  [60-72]   Temperature:  [36.2 °C (97.2 °F)-37.1 °C (98.8 °F)]   Respirations:  [16-18]   BP: (107-129)/(61-79)   Pulse Ox:  [97 %-100 %]     Exam:  Constitutional: No acute distress, resting comfortably  Neuro:  AOx3, grossly intact, some left eye ptosis at baseline  ENMT: moist mucous membranes  Head/neck: atraumatic  CV: no tachycardia  Pulm: non-labored on room air  GI: soft, non-tender, non-distended  Skin: warm and dry  Musculoskeletal: moving all extremities  Extremities: dry skin of both lower extremities with shallow wound on lateral right shin, foot dressing not taken down today, Palpable R femoral and popliteal    Labs:  Results from last 7 days   Lab Units 11/27/24  0656 11/26/24  0457 11/25/24  1229   WBC AUTO x10*3/uL 8.7 8.6 12.4*   HEMOGLOBIN g/dL 9.7* 8.0* 9.0*   PLATELETS AUTO x10*3/uL 324 341 377      Results from last 7 days   Lab Units 11/27/24  0656 11/26/24  0457 11/25/24  1229 11/25/24  1229   SODIUM mmol/L 138 134*  --  135*   POTASSIUM mmol/L 4.4 3.8  --  4.2   CHLORIDE mmol/L 104 102  --  102    CO2 mmol/L 24 24  --  21   BUN mg/dL 12 10  --  17   CREATININE mg/dL 0.90 0.67  --  0.80   GLUCOSE mg/dL 120* 106*  --  210*   MAGNESIUM mg/dL 2.37 1.92   < >  --    PHOSPHORUS mg/dL 3.9 3.2  --  2.5    < > = values in this interval not displayed.      Results from last 7 days   Lab Units 11/26/24  1936 11/25/24  1229   INR   --  2.1*   PROTIME seconds  --  24.2*   APTT seconds 33 33      Results from last 7 days   Lab Units 11/27/24  0311   ANTI XA UNFRACTIONATED IU/mL 0.3

## 2024-11-27 NOTE — PROGRESS NOTES
PODIATRY SERVICE CONSULT PROGRESS NOTE    SERVICE DATE: 11/27/2024   SERVICE TIME:  10:20 AM    Subjective   INTERVAL HPI:   Patient was seen at the bedside in CDU. He reported that he did not miss any appointments at [facility], but was not happy with the care provided there, prompting his decision to switch to . Pain well controlled. Patient denies any constitutional symptoms. No other pedal complaints.       Medications:  Scheduled Meds: acetaminophen, 975 mg, oral, q8h  atorvastatin, 80 mg, oral, Nightly  B complex-vitamin C, 1 tablet, oral, Daily  cholecalciferol, 400 Units, oral, Daily  gabapentin, 300 mg, oral, TID  metoprolol, 5 mg, intravenous, Once  piperacillin-tazobactam, 4.5 g, intravenous, q6h  polyethylene glycol, 17 g, oral, Daily  tamsulosin, 0.4 mg, oral, Nightly  vancomycin, 1,000 mg, intravenous, q12h      Continuous Infusions: heparin, 0-4,000 Units/hr, Last Rate: 1,000 Units/hr (11/26/24 1363)      PRN Meds: PRN medications: alum-mag hydroxide-simeth, metoprolol, metoprolol, metoprolol, metoprolol, metoprolol tartrate, oxyCODONE, vancomycin         Objective   PHYSICAL EXAM:  Physical Exam Performed:  Vitals:    11/27/24 1241   BP: 120/70   Pulse: 70   Resp: 18   Temp:    SpO2: 97%     Body mass index is 25 kg/m².    Patient is AOx3 and in no acute distress. Patient is alert and cooperative.       Vascular: Non-palpable DP/PT pulses B/L. Mild pitting edema noted B/L. Hair growth absent B/L. CFT<5 to B/L hallux. Temperature is warm to cool from tibial tuberosity to distal digits B/L.      Musculoskeletal: Gross active and passive ROM intact to age and activity level. Moves all extremities spontaneously. Charcot deformity noted to right foot. Left hallux amputation noted.      Neurological: Absent light touch sensation B/L. Pain stimuli absent B/L.      Dermatologic: Nails 1-5 are within normal limits for thickness and length B/L. Skin appears diffusely xerotic B/L. Web spaces 1-4 B/L are clean, dry  and intact. Right foot ulcer is stable, dressing left intact.         LABS:   Results for orders placed or performed during the hospital encounter of 11/25/24 (from the past 24 hours)   POCT GLUCOSE   Result Value Ref Range    POCT Glucose 89 74 - 99 mg/dL   aPTT - baseline   Result Value Ref Range    aPTT 33 27 - 38 seconds   Type and Screen   Result Value Ref Range    ABO TYPE B     Rh TYPE POS     ANTIBODY SCREEN NEG    Heparin Assay, UFH   Result Value Ref Range    Heparin Unfractionated 0.3 See Comment Below for Therapeutic Ranges IU/mL   CBC and Auto Differential   Result Value Ref Range    WBC 8.7 4.4 - 11.3 x10*3/uL    nRBC 0.0 0.0 - 0.0 /100 WBCs    RBC 3.57 (L) 4.50 - 5.90 x10*6/uL    Hemoglobin 9.7 (L) 13.5 - 17.5 g/dL    Hematocrit 30.0 (L) 41.0 - 52.0 %    MCV 84 80 - 100 fL    MCH 27.2 26.0 - 34.0 pg    MCHC 32.3 32.0 - 36.0 g/dL    RDW 15.9 (H) 11.5 - 14.5 %    Platelets 324 150 - 450 x10*3/uL    Neutrophils % 54.3 40.0 - 80.0 %    Immature Granulocytes %, Automated 0.3 0.0 - 0.9 %    Lymphocytes % 33.1 13.0 - 44.0 %    Monocytes % 8.7 2.0 - 10.0 %    Eosinophils % 3.1 0.0 - 6.0 %    Basophils % 0.5 0.0 - 2.0 %    Neutrophils Absolute 4.70 1.60 - 5.50 x10*3/uL    Immature Granulocytes Absolute, Automated 0.03 0.00 - 0.50 x10*3/uL    Lymphocytes Absolute 2.87 0.80 - 3.00 x10*3/uL    Monocytes Absolute 0.75 0.05 - 0.80 x10*3/uL    Eosinophils Absolute 0.27 0.00 - 0.40 x10*3/uL    Basophils Absolute 0.04 0.00 - 0.10 x10*3/uL   Renal function panel   Result Value Ref Range    Glucose 120 (H) 74 - 99 mg/dL    Sodium 138 136 - 145 mmol/L    Potassium 4.4 3.5 - 5.3 mmol/L    Chloride 104 98 - 107 mmol/L    Bicarbonate 24 21 - 32 mmol/L    Anion Gap 14 10 - 20 mmol/L    Urea Nitrogen 12 6 - 23 mg/dL    Creatinine 0.90 0.50 - 1.30 mg/dL    eGFR 90 >60 mL/min/1.73m*2    Calcium 8.1 (L) 8.6 - 10.6 mg/dL    Phosphorus 3.9 2.5 - 4.9 mg/dL    Albumin 2.8 (L) 3.4 - 5.0 g/dL   Magnesium   Result Value Ref Range     Magnesium 2.37 1.60 - 2.40 mg/dL   POCT GLUCOSE   Result Value Ref Range    POCT Glucose 127 (H) 74 - 99 mg/dL   Transthoracic Echo (TTE) Complete   Result Value Ref Range    BSA 2.19 m2      Lab Results   Component Value Date    HGBA1C 5.6 11/25/2024      Lab Results   Component Value Date    CRP 16.25 (H) 11/25/2024      Lab Results   Component Value Date    SEDRATE 96 (H) 11/25/2024        Results from last 7 days   Lab Units 11/27/24  0656   WBC AUTO x10*3/uL 8.7   RBC AUTO x10*6/uL 3.57*   HEMOGLOBIN g/dL 9.7*   HEMATOCRIT % 30.0*     Results from last 7 days   Lab Units 11/27/24  0656 11/26/24  0457 11/25/24  1229   SODIUM mmol/L 138   < > 135*   POTASSIUM mmol/L 4.4   < > 4.2   CHLORIDE mmol/L 104   < > 102   CO2 mmol/L 24   < > 21   BUN mg/dL 12   < > 17   CREATININE mg/dL 0.90   < > 0.80   CALCIUM mg/dL 8.1*   < > 8.9   PHOSPHORUS mg/dL 3.9   < > 2.5   MAGNESIUM mg/dL 2.37   < >  --    BILIRUBIN TOTAL mg/dL  --   --  1.3*   ALT U/L  --   --  59*   AST U/L  --   --  37    < > = values in this interval not displayed.     Results from last 7 days   Lab Units 11/25/24  1911   COLOR U  Yellow   APPEARANCE U  Clear   PH U  7.5   SPEC GRAV UR  1.022   PROTEIN U mg/dL 10 (TRACE)   BLOOD UR  NEGATIVE   NITRITE U  NEGATIVE   WBC UR /HPF NONE       IMAGING REVIEW:  Vascular US lower extremity arterial duplex bilateral with ARMANDO    Result Date: 11/26/2024            [place] [address]   Tel [phone] and Fax [phone]  Vascular Lab Report [facility] US LOWER EXTREMITY ARTERIAL DUPLEX BILATERAL WITH ARMANDO  Patient Name:      KRUPA JOHNSON         Reading Physician:  [id number] Brinda Villalobos MD Study Date:        11/26/2024           Ordering Physician: [id number] ELIZABETH RODRIGUES MRN/PID:           [id number]             Technologist:       Rita Berry RVT  Accession#:        [id number]         Technologist 2: Date of Birth/Age: 1950 / 74 years Encounter#:         [id number] Gender:            M Admission Status:  Emergency            Location Performed: [facility]  Diagnosis/ICD: Peripheral vascular disease, unspecified-I73.9 CPT Codes:     57987 Peripheral artery Lower arterial Duplex complete  Patient History Left 1st toe amputation . and PVD.  CONCLUSIONS: Right Lower PVR: Evidence of mild arterial occlusive disease in the right lower extremity at rest. Decreased digital perfusion noted. Monophasic flow is noted in the right posterior tibial artery. Multiphasic flow is noted in the right common femoral artery. Left Lower PVR: No evidence of arterial occlusive disease in the left lower extremity at rest. Multiphasic flow is noted in the left common femoral artery, left posterior tibial artery and left dorsalis pedis artery.  Additional Findings: No BP right arm.  Imaging & Doppler Findings:  RIGHT Lower PVR                Pressures Ratios Right Posterior Tibial (Ankle) 96 mmHg   0.89 Right Dorsalis Pedis (Ankle)   91 mmHg   0.84 Right Digit (Great Toe)        0 mmHg    0.00   LEFT Lower PVR                Pressures Ratios Left Posterior Tibial (Ankle) 117 mmHg  1.08 Left Dorsalis Pedis (Ankle)   107 mmHg  0.99                      Left Brachial Pressure 108 mmHg   [id number] Brinda Villalobos MD Electronically signed by [id number] Brinda Villalobos MD on 11/26/2024 at 6:36:08 PM  ** Final **           Assessment/Plan   ASSESSMENT & PLAN:    # Osteomyelitis right foot  # Non pressure ulcer with fat layer exposed B/L LE  # Charcot deformity right foot  # DM with peripheral neuropathy  # PVD    - Patient was seen and evaluated; all findings were discussed and all questions were answered to patient's satisfaction.  - Charts, labs, vitals and imaging all reviewed.   - Labs: WBC 8.7, Glu 120  - Wound culture: 2+ Few pseudomonas  - Blood culture: No growth at 1  day    Plan:   Abx: Continue antibiotic per primary/ID  - Patient has osteomyelitis and charcot deformity of the right foot. His foot is deemed unsalvageable. BKA was discussed with the patient, and he appears to be agreeable to the procedure.   - Patient is scheduled for R BKA on Friday by vascular team.   - Recommend betadine dressing WTD for right plantar foot, Mepilex dressing to right lateral LE and Left foot.   - Weight bearing to the heel of the right foot as telerated.   - Podiatry to sign off.   - Patient seen and discussed with attending Dr. Michael DPM.        Terrell Blum DPM PGY-1  Podiatric Medicine and Surgery   Epic Secure Chat            SIGNATURE: Terrell Blum DPM PATIENT NAME: Dustin Ace   DATE: November 27, 2024 MRN: [id number]   TIME: 1:04 PM CONTACT: [employer] Message

## 2024-11-28 LAB
ALBUMIN SERPL BCP-MCNC: 3.5 G/DL (ref 3.4–5)
ANION GAP SERPL CALC-SCNC: 13 MMOL/L (ref 10–20)
BASOPHILS # BLD AUTO: 0.04 X10*3/UL (ref 0–0.1)
BASOPHILS NFR BLD AUTO: 0.4 %
BUN SERPL-MCNC: 9 MG/DL (ref 6–23)
CALCIUM SERPL-MCNC: 8.9 MG/DL (ref 8.6–10.6)
CHLORIDE SERPL-SCNC: 103 MMOL/L (ref 98–107)
CO2 SERPL-SCNC: 24 MMOL/L (ref 21–32)
CREAT SERPL-MCNC: 0.79 MG/DL (ref 0.5–1.3)
EGFRCR SERPLBLD CKD-EPI 2021: >90 ML/MIN/1.73M*2
EOSINOPHIL # BLD AUTO: 0.27 X10*3/UL (ref 0–0.4)
EOSINOPHIL NFR BLD AUTO: 2.7 %
ERYTHROCYTE [DISTWIDTH] IN BLOOD BY AUTOMATED COUNT: 16.5 % (ref 11.5–14.5)
GLUCOSE BLD MANUAL STRIP-MCNC: 125 MG/DL (ref 74–99)
GLUCOSE BLD MANUAL STRIP-MCNC: 140 MG/DL (ref 74–99)
GLUCOSE SERPL-MCNC: 117 MG/DL (ref 74–99)
HCT VFR BLD AUTO: 30.3 % (ref 41–52)
HGB BLD-MCNC: 9.6 G/DL (ref 13.5–17.5)
IMM GRANULOCYTES # BLD AUTO: 0.05 X10*3/UL (ref 0–0.5)
IMM GRANULOCYTES NFR BLD AUTO: 0.5 % (ref 0–0.9)
INR PPP: 1.1 (ref 0.9–1.1)
INR PPP: 3.7 (ref 0.9–1.1)
LYMPHOCYTES # BLD AUTO: 2.45 X10*3/UL (ref 0.8–3)
LYMPHOCYTES NFR BLD AUTO: 24.6 %
MAGNESIUM SERPL-MCNC: 2.22 MG/DL (ref 1.6–2.4)
MCH RBC QN AUTO: 27.8 PG (ref 26–34)
MCHC RBC AUTO-ENTMCNC: 31.7 G/DL (ref 32–36)
MCV RBC AUTO: 88 FL (ref 80–100)
MONOCYTES # BLD AUTO: 0.86 X10*3/UL (ref 0.05–0.8)
MONOCYTES NFR BLD AUTO: 8.7 %
NEUTROPHILS # BLD AUTO: 6.27 X10*3/UL (ref 1.6–5.5)
NEUTROPHILS NFR BLD AUTO: 63.1 %
NRBC BLD-RTO: 0 /100 WBCS (ref 0–0)
PHOSPHATE SERPL-MCNC: 3 MG/DL (ref 2.5–4.9)
PLATELET # BLD AUTO: 419 X10*3/UL (ref 150–450)
POTASSIUM SERPL-SCNC: 4.3 MMOL/L (ref 3.5–5.3)
PROTHROMBIN TIME: 12.9 SECONDS (ref 9.8–12.8)
PROTHROMBIN TIME: 41.9 SECONDS (ref 9.8–12.8)
RBC # BLD AUTO: 3.45 X10*6/UL (ref 4.5–5.9)
SODIUM SERPL-SCNC: 136 MMOL/L (ref 136–145)
UFH PPP CHRO-ACNC: 0.1 IU/ML
UFH PPP CHRO-ACNC: 0.1 IU/ML
UFH PPP CHRO-ACNC: 1.1 IU/ML
VANCOMYCIN SERPL-MCNC: 14.1 UG/ML (ref 5–20)
WBC # BLD AUTO: 9.9 X10*3/UL (ref 4.4–11.3)

## 2024-11-28 PROCEDURE — 2500000004 HC RX 250 GENERAL PHARMACY W/ HCPCS (ALT 636 FOR OP/ED)

## 2024-11-28 PROCEDURE — 85025 COMPLETE CBC W/AUTO DIFF WBC: CPT

## 2024-11-28 PROCEDURE — 2500000001 HC RX 250 WO HCPCS SELF ADMINISTERED DRUGS (ALT 637 FOR MEDICARE OP)

## 2024-11-28 PROCEDURE — 36415 COLL VENOUS BLD VENIPUNCTURE: CPT

## 2024-11-28 PROCEDURE — 99233 SBSQ HOSP IP/OBS HIGH 50: CPT | Performed by: INTERNAL MEDICINE

## 2024-11-28 PROCEDURE — 1200000002 HC GENERAL ROOM WITH TELEMETRY DAILY

## 2024-11-28 PROCEDURE — 80202 ASSAY OF VANCOMYCIN: CPT | Performed by: INTERNAL MEDICINE

## 2024-11-28 PROCEDURE — 2500000002 HC RX 250 W HCPCS SELF ADMINISTERED DRUGS (ALT 637 FOR MEDICARE OP, ALT 636 FOR OP/ED)

## 2024-11-28 PROCEDURE — 83735 ASSAY OF MAGNESIUM: CPT

## 2024-11-28 PROCEDURE — 80069 RENAL FUNCTION PANEL: CPT

## 2024-11-28 PROCEDURE — 85520 HEPARIN ASSAY: CPT

## 2024-11-28 PROCEDURE — 85610 PROTHROMBIN TIME: CPT

## 2024-11-28 PROCEDURE — 82947 ASSAY GLUCOSE BLOOD QUANT: CPT

## 2024-11-28 ASSESSMENT — COGNITIVE AND FUNCTIONAL STATUS - GENERAL
MOBILITY SCORE: 22
MOBILITY SCORE: 22
CLIMB 3 TO 5 STEPS WITH RAILING: A LOT
DAILY ACTIVITIY SCORE: 24
CLIMB 3 TO 5 STEPS WITH RAILING: A LOT
DAILY ACTIVITIY SCORE: 24

## 2024-11-28 ASSESSMENT — PAIN SCALES - GENERAL
PAINLEVEL_OUTOF10: 0 - NO PAIN
PAINLEVEL_OUTOF10: 9
PAINLEVEL_OUTOF10: 8
PAINLEVEL_OUTOF10: 8
PAINLEVEL_OUTOF10: 0 - NO PAIN
PAINLEVEL_OUTOF10: 10 - WORST POSSIBLE PAIN

## 2024-11-28 ASSESSMENT — PAIN DESCRIPTION - ORIENTATION: ORIENTATION: RIGHT

## 2024-11-28 ASSESSMENT — PAIN - FUNCTIONAL ASSESSMENT
PAIN_FUNCTIONAL_ASSESSMENT: 0-10

## 2024-11-28 ASSESSMENT — PAIN DESCRIPTION - LOCATION: LOCATION: FOOT

## 2024-11-28 NOTE — PROGRESS NOTES
VASCULAR SURGERY PROGRESS NOTE  Assessment/Plan   Dustin Ace is 74 y.o. male with history of emphysema, BPH, CVA hx, PAD, Afib (on Eliquis), HTN, DM who presented to the ED 11/25 with nonhealing wound of his right foot, fevers, chronic osteomyelitis. Right foot has been deemed unsalvageable. He is amenable to a R BKA.   Although febrile on arrival, vitals have stabilized, WBC count normalized on vanc/zosyn.     CTA coronaries showing multivessel disease -- per conversation with cardiology this morning, heart cath will not be needed. Will proceed with plans for amputation tomorrow in OR.    Plan:  R BKA on Friday  Continue antibiotics  Please make NPO at midnight the night before surgery  Ensure up to date Type and Screen available (11/27 last screen)  Morning labs not sent, please make sure CBC, RFP, and coags have been sent today (INR 5.8 yesterday)  If INR is still elevated, it will need to be treated prior to any amputation  Okay to bridge Eliquis with heparin gtt    D/w attending, Dr. Dunphy Grant B Hubbard, MD  Vascular Surgery Fellow  Service Pager: [phone]    Subjective   No issues. Pain has not worsened in his foot. No fevers or chills overnight. He is happy he ate dinner.     Objective   Vitals:  Heart Rate:  [60-71]   Temp:  [36.6 °C (97.9 °F)]   Resp:  [16-18]   BP: ()/(58-74)   SpO2:  [97 %-100 %]     Exam:  Constitutional: No acute distress, resting comfortably  Neuro:  AOx3, grossly intact, some left eye ptosis at baseline  ENMT: moist mucous membranes  Head/neck: atraumatic  CV: no tachycardia  Pulm: non-labored on room air  GI: soft, non-tender, non-distended  Skin: warm and dry  Musculoskeletal: moving all extremities  Extremities: dry skin of both lower extremities with shallow wound on lateral right shin, foot dressing not taken down today, Palpable R femoral and popliteal    Labs:  Results from last 7 days   Lab Units 11/27/24  0656 11/26/24  0457 11/25/24  1229   WBC AUTO x10*3/uL 8.7  8.6 12.4*   HEMOGLOBIN g/dL 9.7* 8.0* 9.0*   PLATELETS AUTO x10*3/uL 324 341 377      Results from last 7 days   Lab Units 11/27/24  0656 11/26/24  0457 11/25/24  1229 11/25/24  1229   SODIUM mmol/L 138 134*  --  135*   POTASSIUM mmol/L 4.4 3.8  --  4.2   CHLORIDE mmol/L 104 102  --  102   CO2 mmol/L 24 24  --  21   BUN mg/dL 12 10  --  17   CREATININE mg/dL 0.90 0.67  --  0.80   GLUCOSE mg/dL 120* 106*  --  210*   MAGNESIUM mg/dL 2.37 1.92   < >  --    PHOSPHORUS mg/dL 3.9 3.2  --  2.5    < > = values in this interval not displayed.      Results from last 7 days   Lab Units 11/27/24  1253 11/26/24  1936 11/25/24  1229   INR  5.8*  --  2.1*   PROTIME seconds 66.9*  --  24.2*   APTT seconds >200*   < > 33    < > = values in this interval not displayed.      Results from last 7 days   Lab Units 11/28/24  0921 11/27/24  2240 11/27/24  1610   ANTI XA UNFRACTIONATED IU/mL 1.1* 0.2 0.1

## 2024-11-28 NOTE — NURSING NOTE
Pt refusing for staff to draw heparin lab draws. MD aware, spoke to Pt about refusals. Will have lab attempt to draw Pt. Heparin infusing at this time, no s/s of bleeding or hemorrhage.

## 2024-11-28 NOTE — PROGRESS NOTES
Vancomycin Dosing by Pharmacy- FOLLOW UP    Dustin Ace is a 74 y.o. year old male who Pharmacy has been consulted for vancomycin dosing for osteomyelitis/septic arthritis. Based on the patient's indication and renal status this patient is being dosed based on a goal trough/random level of 15-20.     Renal function is currently stable.    Current vancomycin dose: 1000 mg given every 12 hours        Visit Vitals  /63   Pulse 77   Temp 37 °C (98.6 °F)   Resp 18        Lab Results   Component Value Date    CREATININE 0.90 2024    CREATININE 0.67 2024    CREATININE 0.80 2024    CREATININE 0.88 10/25/2021        Patient weight is as follows:   Vitals:    24 1123   Weight: 90.7 kg (200 lb)       Cultures:  Susceptibility data for the encounter in last 14 days.  Collected Specimen Info Organism Aztreonam Cefepime Ceftazidime Ciprofloxacin Levofloxacin Piperacillin/Tazobactam Tobramycin   24 Tissue/Biopsy from Wound/Tissue Pseudomonas aeruginosa  S  S  S  S  S  S  S     Streptococcus gordonii               I/O last 3 completed shifts:  In: 791.5 (8.7 mL/kg) [I.V.:191.5 (2.1 mL/kg); IV Piggyback:600]  Out: - (0 mL/kg)   Weight: 90.7 kg   I/O during current shift:  I/O this shift:  In: 480 [P.O.:480]  Out: 260 [Urine:260]    Temp (24hrs), Av.7 °C (98.1 °F), Min:36.5 °C (97.7 °F), Max:37 °C (98.6 °F)      Assessment/Plan    Within goal AUC range. Continue current vancomycin regimen.    This dosing regimen is predicted by [employer] to result in the following pharmacokinetic parameters:  Regimen: 1000 mg IV every 12 hours.  Start time: 16:52 on 2024  Exposure target: AUC24 (range)400-600 mg/L.hr   [id number]: 445 mg/L.hr  AUC24,ss: 461 mg/L.hr  Probability of AUC24 > 400: 83 %  Ctrough,ss: 14.5 mg/L  Probability of Ctrough,ss > 20: 8 %    The next level will be obtained on  at am labs. May be obtained sooner if clinically indicated.   Will continue to monitor renal function daily  while on vancomycin and order serum creatinine at least every 48 hours if not already ordered.  Follow for continued vancomycin needs, clinical response, and signs/symptoms of toxicity.       Lara Herman RPh

## 2024-11-29 ENCOUNTER — ANESTHESIA (OUTPATIENT)
Dept: OPERATING ROOM | Facility: HOSPITAL | Age: 74
End: 2024-11-29
Payer: MEDICARE

## 2024-11-29 LAB
ALBUMIN SERPL BCP-MCNC: 3.1 G/DL (ref 3.4–5)
ANION GAP SERPL CALC-SCNC: 12 MMOL/L (ref 10–20)
APTT PPP: 31 SECONDS (ref 27–38)
BACTERIA BLD CULT: NORMAL
BACTERIA BLD CULT: NORMAL
BACTERIA SPEC CULT: ABNORMAL
BACTERIA SPEC CULT: ABNORMAL
BASOPHILS # BLD AUTO: 0.03 X10*3/UL (ref 0–0.1)
BASOPHILS NFR BLD AUTO: 0.4 %
BUN SERPL-MCNC: 7 MG/DL (ref 6–23)
CALCIUM SERPL-MCNC: 8.6 MG/DL (ref 8.6–10.6)
CHLORIDE SERPL-SCNC: 104 MMOL/L (ref 98–107)
CO2 SERPL-SCNC: 24 MMOL/L (ref 21–32)
CREAT SERPL-MCNC: 0.76 MG/DL (ref 0.5–1.3)
EGFRCR SERPLBLD CKD-EPI 2021: >90 ML/MIN/1.73M*2
EOSINOPHIL # BLD AUTO: 0.19 X10*3/UL (ref 0–0.4)
EOSINOPHIL NFR BLD AUTO: 2.8 %
ERYTHROCYTE [DISTWIDTH] IN BLOOD BY AUTOMATED COUNT: 16.6 % (ref 11.5–14.5)
GLUCOSE BLD MANUAL STRIP-MCNC: 155 MG/DL (ref 74–99)
GLUCOSE BLD MANUAL STRIP-MCNC: 86 MG/DL (ref 74–99)
GLUCOSE SERPL-MCNC: 88 MG/DL (ref 74–99)
GRAM STN SPEC: ABNORMAL
GRAM STN SPEC: ABNORMAL
HCT VFR BLD AUTO: 28.3 % (ref 41–52)
HGB BLD-MCNC: 8.7 G/DL (ref 13.5–17.5)
IMM GRANULOCYTES # BLD AUTO: 0.04 X10*3/UL (ref 0–0.5)
IMM GRANULOCYTES NFR BLD AUTO: 0.6 % (ref 0–0.9)
INR PPP: 1.3 (ref 0.9–1.1)
LYMPHOCYTES # BLD AUTO: 1.88 X10*3/UL (ref 0.8–3)
LYMPHOCYTES NFR BLD AUTO: 28.1 %
MAGNESIUM SERPL-MCNC: 1.98 MG/DL (ref 1.6–2.4)
MCH RBC QN AUTO: 27.4 PG (ref 26–34)
MCHC RBC AUTO-ENTMCNC: 30.7 G/DL (ref 32–36)
MCV RBC AUTO: 89 FL (ref 80–100)
MONOCYTES # BLD AUTO: 0.64 X10*3/UL (ref 0.05–0.8)
MONOCYTES NFR BLD AUTO: 9.6 %
NEUTROPHILS # BLD AUTO: 3.92 X10*3/UL (ref 1.6–5.5)
NEUTROPHILS NFR BLD AUTO: 58.5 %
NRBC BLD-RTO: 0 /100 WBCS (ref 0–0)
PHOSPHATE SERPL-MCNC: 3.5 MG/DL (ref 2.5–4.9)
PLATELET # BLD AUTO: 380 X10*3/UL (ref 150–450)
POTASSIUM SERPL-SCNC: 4.2 MMOL/L (ref 3.5–5.3)
PROTHROMBIN TIME: 14.1 SECONDS (ref 9.8–12.8)
RBC # BLD AUTO: 3.17 X10*6/UL (ref 4.5–5.9)
SODIUM SERPL-SCNC: 136 MMOL/L (ref 136–145)
UFH PPP CHRO-ACNC: <0.1 IU/ML
VANCOMYCIN SERPL-MCNC: 10.5 UG/ML (ref 5–20)
WBC # BLD AUTO: 6.7 X10*3/UL (ref 4.4–11.3)

## 2024-11-29 PROCEDURE — 2500000004 HC RX 250 GENERAL PHARMACY W/ HCPCS (ALT 636 FOR OP/ED)

## 2024-11-29 PROCEDURE — 3600000003 HC OR TIME - INITIAL BASE CHARGE - PROCEDURE LEVEL THREE: Performed by: STUDENT IN AN ORGANIZED HEALTH CARE EDUCATION/TRAINING PROGRAM

## 2024-11-29 PROCEDURE — 7100000001 HC RECOVERY ROOM TIME - INITIAL BASE CHARGE: Performed by: STUDENT IN AN ORGANIZED HEALTH CARE EDUCATION/TRAINING PROGRAM

## 2024-11-29 PROCEDURE — 2500000001 HC RX 250 WO HCPCS SELF ADMINISTERED DRUGS (ALT 637 FOR MEDICARE OP)

## 2024-11-29 PROCEDURE — 2720000007 HC OR 272 NO HCPCS: Performed by: STUDENT IN AN ORGANIZED HEALTH CARE EDUCATION/TRAINING PROGRAM

## 2024-11-29 PROCEDURE — 27889 ANKLE DISARTICULATION: CPT | Performed by: STUDENT IN AN ORGANIZED HEALTH CARE EDUCATION/TRAINING PROGRAM

## 2024-11-29 PROCEDURE — 2500000004 HC RX 250 GENERAL PHARMACY W/ HCPCS (ALT 636 FOR OP/ED): Performed by: STUDENT IN AN ORGANIZED HEALTH CARE EDUCATION/TRAINING PROGRAM

## 2024-11-29 PROCEDURE — 80069 RENAL FUNCTION PANEL: CPT

## 2024-11-29 PROCEDURE — 85730 THROMBOPLASTIN TIME PARTIAL: CPT

## 2024-11-29 PROCEDURE — 86923 COMPATIBILITY TEST ELECTRIC: CPT

## 2024-11-29 PROCEDURE — 82947 ASSAY GLUCOSE BLOOD QUANT: CPT

## 2024-11-29 PROCEDURE — 7100000002 HC RECOVERY ROOM TIME - EACH INCREMENTAL 1 MINUTE: Performed by: STUDENT IN AN ORGANIZED HEALTH CARE EDUCATION/TRAINING PROGRAM

## 2024-11-29 PROCEDURE — 3600000008 HC OR TIME - EACH INCREMENTAL 1 MINUTE - PROCEDURE LEVEL THREE: Performed by: STUDENT IN AN ORGANIZED HEALTH CARE EDUCATION/TRAINING PROGRAM

## 2024-11-29 PROCEDURE — 86850 RBC ANTIBODY SCREEN: CPT

## 2024-11-29 PROCEDURE — 2500000002 HC RX 250 W HCPCS SELF ADMINISTERED DRUGS (ALT 637 FOR MEDICARE OP, ALT 636 FOR OP/ED)

## 2024-11-29 PROCEDURE — 3700000002 HC GENERAL ANESTHESIA TIME - EACH INCREMENTAL 1 MINUTE: Performed by: STUDENT IN AN ORGANIZED HEALTH CARE EDUCATION/TRAINING PROGRAM

## 2024-11-29 PROCEDURE — 1200000002 HC GENERAL ROOM WITH TELEMETRY DAILY

## 2024-11-29 PROCEDURE — 80202 ASSAY OF VANCOMYCIN: CPT

## 2024-11-29 PROCEDURE — 2500000005 HC RX 250 GENERAL PHARMACY W/O HCPCS: Performed by: STUDENT IN AN ORGANIZED HEALTH CARE EDUCATION/TRAINING PROGRAM

## 2024-11-29 PROCEDURE — 83735 ASSAY OF MAGNESIUM: CPT

## 2024-11-29 PROCEDURE — 2500000005 HC RX 250 GENERAL PHARMACY W/O HCPCS

## 2024-11-29 PROCEDURE — 3700000001 HC GENERAL ANESTHESIA TIME - INITIAL BASE CHARGE: Performed by: STUDENT IN AN ORGANIZED HEALTH CARE EDUCATION/TRAINING PROGRAM

## 2024-11-29 PROCEDURE — 85025 COMPLETE CBC W/AUTO DIFF WBC: CPT

## 2024-11-29 PROCEDURE — 99233 SBSQ HOSP IP/OBS HIGH 50: CPT

## 2024-11-29 PROCEDURE — 0Y6M0Z0 DETACHMENT AT RIGHT FOOT, COMPLETE, OPEN APPROACH: ICD-10-PCS | Performed by: STUDENT IN AN ORGANIZED HEALTH CARE EDUCATION/TRAINING PROGRAM

## 2024-11-29 PROCEDURE — 85520 HEPARIN ASSAY: CPT

## 2024-11-29 PROCEDURE — 36415 COLL VENOUS BLD VENIPUNCTURE: CPT

## 2024-11-29 RX ORDER — ONDANSETRON HYDROCHLORIDE 2 MG/ML
INJECTION, SOLUTION INTRAVENOUS AS NEEDED
Status: DISCONTINUED | OUTPATIENT
Start: 2024-11-29 | End: 2024-11-29

## 2024-11-29 RX ORDER — ALBUTEROL SULFATE 0.83 MG/ML
2.5 SOLUTION RESPIRATORY (INHALATION) ONCE AS NEEDED
Status: DISCONTINUED | OUTPATIENT
Start: 2024-11-29 | End: 2024-11-29 | Stop reason: HOSPADM

## 2024-11-29 RX ORDER — OXYCODONE HYDROCHLORIDE 5 MG/1
5 TABLET ORAL EVERY 4 HOURS PRN
Status: DISCONTINUED | OUTPATIENT
Start: 2024-11-29 | End: 2024-11-29 | Stop reason: HOSPADM

## 2024-11-29 RX ORDER — LIDOCAINE HYDROCHLORIDE 10 MG/ML
0.1 INJECTION, SOLUTION INFILTRATION; PERINEURAL ONCE
Status: DISCONTINUED | OUTPATIENT
Start: 2024-11-29 | End: 2024-11-29 | Stop reason: HOSPADM

## 2024-11-29 RX ORDER — HEPARIN SODIUM 10000 [USP'U]/100ML
0-4000 INJECTION, SOLUTION INTRAVENOUS CONTINUOUS
Status: DISCONTINUED | OUTPATIENT
Start: 2024-11-29 | End: 2024-12-02

## 2024-11-29 RX ORDER — LIDOCAINE HCL/PF 100 MG/5ML
SYRINGE (ML) INTRAVENOUS AS NEEDED
Status: DISCONTINUED | OUTPATIENT
Start: 2024-11-29 | End: 2024-11-29

## 2024-11-29 RX ORDER — HYDROMORPHONE HYDROCHLORIDE 0.2 MG/ML
0.2 INJECTION INTRAMUSCULAR; INTRAVENOUS; SUBCUTANEOUS EVERY 5 MIN PRN
Status: DISCONTINUED | OUTPATIENT
Start: 2024-11-29 | End: 2024-11-29 | Stop reason: HOSPADM

## 2024-11-29 RX ORDER — ROCURONIUM BROMIDE 10 MG/ML
INJECTION, SOLUTION INTRAVENOUS AS NEEDED
Status: DISCONTINUED | OUTPATIENT
Start: 2024-11-29 | End: 2024-11-29

## 2024-11-29 RX ORDER — HYDROMORPHONE HYDROCHLORIDE 1 MG/ML
0.4 INJECTION, SOLUTION INTRAMUSCULAR; INTRAVENOUS; SUBCUTANEOUS
Status: DISCONTINUED | OUTPATIENT
Start: 2024-11-29 | End: 2024-12-07 | Stop reason: HOSPADM

## 2024-11-29 RX ORDER — FENTANYL CITRATE 50 UG/ML
INJECTION, SOLUTION INTRAMUSCULAR; INTRAVENOUS AS NEEDED
Status: DISCONTINUED | OUTPATIENT
Start: 2024-11-29 | End: 2024-11-29

## 2024-11-29 RX ORDER — METHOCARBAMOL 500 MG/1
500 TABLET, FILM COATED ORAL EVERY 8 HOURS SCHEDULED
Status: DISCONTINUED | OUTPATIENT
Start: 2024-11-29 | End: 2024-12-04

## 2024-11-29 RX ORDER — SODIUM CHLORIDE 0.9 G/100ML
IRRIGANT IRRIGATION AS NEEDED
Status: DISCONTINUED | OUTPATIENT
Start: 2024-11-29 | End: 2024-11-29 | Stop reason: HOSPADM

## 2024-11-29 RX ORDER — VANCOMYCIN HYDROCHLORIDE
1250 EVERY 12 HOURS
Status: DISCONTINUED | OUTPATIENT
Start: 2024-11-29 | End: 2024-11-30

## 2024-11-29 RX ORDER — CEFAZOLIN 1 G/1
INJECTION, POWDER, FOR SOLUTION INTRAVENOUS AS NEEDED
Status: DISCONTINUED | OUTPATIENT
Start: 2024-11-29 | End: 2024-11-29

## 2024-11-29 RX ORDER — MIDAZOLAM HYDROCHLORIDE 1 MG/ML
INJECTION INTRAMUSCULAR; INTRAVENOUS AS NEEDED
Status: DISCONTINUED | OUTPATIENT
Start: 2024-11-29 | End: 2024-11-29

## 2024-11-29 RX ORDER — ONDANSETRON HYDROCHLORIDE 2 MG/ML
4 INJECTION, SOLUTION INTRAVENOUS ONCE AS NEEDED
Status: DISCONTINUED | OUTPATIENT
Start: 2024-11-29 | End: 2024-11-29 | Stop reason: HOSPADM

## 2024-11-29 RX ORDER — LABETALOL HYDROCHLORIDE 5 MG/ML
5 INJECTION, SOLUTION INTRAVENOUS ONCE AS NEEDED
Status: DISCONTINUED | OUTPATIENT
Start: 2024-11-29 | End: 2024-11-29 | Stop reason: HOSPADM

## 2024-11-29 RX ORDER — SODIUM CHLORIDE, SODIUM LACTATE, POTASSIUM CHLORIDE, CALCIUM CHLORIDE 600; 310; 30; 20 MG/100ML; MG/100ML; MG/100ML; MG/100ML
100 INJECTION, SOLUTION INTRAVENOUS CONTINUOUS
Status: DISCONTINUED | OUTPATIENT
Start: 2024-11-29 | End: 2024-11-29 | Stop reason: HOSPADM

## 2024-11-29 RX ORDER — PROPOFOL 10 MG/ML
INJECTION, EMULSION INTRAVENOUS AS NEEDED
Status: DISCONTINUED | OUTPATIENT
Start: 2024-11-29 | End: 2024-11-29

## 2024-11-29 RX ORDER — ACETAMINOPHEN 325 MG/1
650 TABLET ORAL EVERY 4 HOURS PRN
Status: DISCONTINUED | OUTPATIENT
Start: 2024-11-29 | End: 2024-11-29 | Stop reason: HOSPADM

## 2024-11-29 RX ORDER — HYDROMORPHONE HYDROCHLORIDE 1 MG/ML
INJECTION, SOLUTION INTRAMUSCULAR; INTRAVENOUS; SUBCUTANEOUS CONTINUOUS PRN
Status: DISCONTINUED | OUTPATIENT
Start: 2024-11-29 | End: 2024-11-29

## 2024-11-29 ASSESSMENT — COGNITIVE AND FUNCTIONAL STATUS - GENERAL
MOBILITY SCORE: 22
CLIMB 3 TO 5 STEPS WITH RAILING: A LOT
WALKING IN HOSPITAL ROOM: A LITTLE
CLIMB 3 TO 5 STEPS WITH RAILING: A LOT
DAILY ACTIVITIY SCORE: 24
DAILY ACTIVITIY SCORE: 24
MOBILITY SCORE: 21

## 2024-11-29 ASSESSMENT — PAIN SCALES - GENERAL
PAINLEVEL_OUTOF10: 8
PAINLEVEL_OUTOF10: 3
PAINLEVEL_OUTOF10: 2
PAINLEVEL_OUTOF10: 8
PAINLEVEL_OUTOF10: 0 - NO PAIN
PAINLEVEL_OUTOF10: 4
PAINLEVEL_OUTOF10: 8
PAIN_LEVEL: 0

## 2024-11-29 ASSESSMENT — PAIN - FUNCTIONAL ASSESSMENT
PAIN_FUNCTIONAL_ASSESSMENT: 0-10
PAIN_FUNCTIONAL_ASSESSMENT: 0-10
PAIN_FUNCTIONAL_ASSESSMENT: UNABLE TO SELF-REPORT
PAIN_FUNCTIONAL_ASSESSMENT: UNABLE TO SELF-REPORT
PAIN_FUNCTIONAL_ASSESSMENT: 0-10
PAIN_FUNCTIONAL_ASSESSMENT: 0-10

## 2024-11-29 ASSESSMENT — COLUMBIA-SUICIDE SEVERITY RATING SCALE - C-SSRS
1. IN THE PAST MONTH, HAVE YOU WISHED YOU WERE DEAD OR WISHED YOU COULD GO TO SLEEP AND NOT WAKE UP?: NO
2. HAVE YOU ACTUALLY HAD ANY THOUGHTS OF KILLING YOURSELF?: NO
6. HAVE YOU EVER DONE ANYTHING, STARTED TO DO ANYTHING, OR PREPARED TO DO ANYTHING TO END YOUR LIFE?: NO

## 2024-11-29 ASSESSMENT — PAIN SCALES - PAIN ASSESSMENT IN ADVANCED DEMENTIA (PAINAD): TOTALSCORE: MEDICATION (SEE MAR)

## 2024-11-29 ASSESSMENT — PAIN DESCRIPTION - DESCRIPTORS: DESCRIPTORS: DISCOMFORT;DULL

## 2024-11-29 NOTE — PROGRESS NOTES
Physical Therapy                 Therapy Communication Note    Patient Name: Dustin Ace  MRN: [id number]  Department: [place] [address]  Room: 60/6010  Today's Date: 11/29/2024     Discipline: Physical Therapy    Missed Visit Reason: Missed Visit Reason:  (@802 per chart review, pt pending OR for BKA this date. Will hold until post-op or further medical POC is known.)    Missed Time: Attempt      11/29/24 at 8:03 AM - Xochilt Robb, PT

## 2024-11-29 NOTE — SIGNIFICANT EVENT
Patient now s/p BKA. Continue medical management for cardiac conditions, resume home meds when appropriate per surgical team and follow up in cardiology clinic for routine follow up.     Cardiology will sign off.

## 2024-11-29 NOTE — CARE PLAN
CTA coronary reviewed and discussed with Dr. Gonsalez.   No further cardiac work-up warranted at this time prior to his planned BKA.   Patient with mild-moderate CAD with no critical disease noted on CTA that would warrant cardiac cath/angiogram. Patient at intermediate risk for cardiac events jona-op for an intermediate risk surgery.

## 2024-11-29 NOTE — CONSULTS
"Nutrition Initial Assessment:   Nutrition Assessment    Reason for Assessment: Admission nursing screening    Patient is a 74 y.o. male presenting with osteomyelitis of R foot. Podiatry determined foot is unsalvageable, BKA planned for today 11/29.    PMHx of BPH, centrilobular emphysema, chronic hepatitis C (treated), CVA, diabetes mellitus, hypertension, PAD, history of Charcot deformity, chronic right foot osteomyelitis, paroxysmal atrial fibrillation       Nutrition History:  Energy Intake: Good > 75 %  Food and Nutrient History: Unable to visit, pt currently in OR for BKA. Pt with 3 documented meals (100% consumption of each meal). Pt would benefit from Prostat AWC iso multiple chronic wounds and new BKA.  Vitamin/Herbal Supplement Use: Vit B-12, B complex, and vit D  Food Allergies/Intolerances:  None  GI Symptoms: None  Oral Problems: None       Anthropometrics:  Height: 190.5 cm (6' 3\")   Weight: 90.7 kg (199 lb 15.3 oz)   BMI (Calculated): 24.99  IBW/kg (Dietitian Calculated): 89.1 kg  Percent of IBW: 101 %       Weight History:   Wt Readings from Last 20 Encounters:   11/29/24 90.7 kg (199 lb 15.3 oz)   11/28/24 90.7 kg (200 lb)   11/27/24 90.7 kg (200 lb)       Weight Change %:  Weight History / % Weight Change: No weight history to assess, pt currently with stable weight during this admission    Nutrition Focused Physical Exam Findings:  Defer, pt in OR at this time  Subcutaneous Fat Loss:      Muscle Wasting:     Edema:  Edema Location: unspecified BLE  Physical Findings:  Skin: Positive (2 diabetic ulcers on R foot, 1 diabetic ulcer on L foot)    Nutrition Significant Labs:  CBC Trend:   Results from last 7 days   Lab Units 11/28/24  1616 11/27/24  0656 11/26/24  0457 11/25/24  1229   WBC AUTO x10*3/uL 9.9 8.7 8.6 12.4*   RBC AUTO x10*6/uL 3.45* 3.57* 2.90* 3.28*   HEMOGLOBIN g/dL 9.6* 9.7* 8.0* 9.0*   HEMATOCRIT % 30.3* 30.0* 23.3* 27.0*   MCV fL 88 84 80 82   PLATELETS AUTO x10*3/uL 419 076 963 628 " "   , A1C:  Lab Results   Component Value Date    HGBA1C 5.6 11/25/2024   , BG POCT trend:   Results from last 7 days   Lab Units 11/28/24  1628 11/28/24  0726 11/27/24  0701 11/26/24  1428 11/26/24  0640   POCT GLUCOSE mg/dL 140* 125* 127* 89 95    , Liver Function Trend:   Results from last 7 days   Lab Units 11/25/24  1229   ALK PHOS U/L 74   AST U/L 37   ALT U/L 59*   BILIRUBIN TOTAL mg/dL 1.3*    , Renal Lab Trend:   Results from last 7 days   Lab Units 11/28/24  1616 11/27/24  0656 11/26/24  0457 11/25/24  1229 11/25/24  1229   POTASSIUM mmol/L 4.3 4.4 3.8  --  4.2   PHOSPHORUS mg/dL 3.0 3.9 3.2  --  2.5   SODIUM mmol/L 136 138 134*  --  135*   MAGNESIUM mg/dL 2.22 2.37 1.92   < >  --    EGFR mL/min/1.73m*2 >90 90 >90  --  >90   BUN mg/dL 9 12 10  --  17   CREATININE mg/dL 0.79 0.90 0.67  --  0.80    < > = values in this interval not displayed.    , Vit D: No results found for: \"VITD25\" , Vit B12: No results found for: \"QWYDXABV24\" , Iron Panel:   Lab Results   Component Value Date    IRON 25 (L) 11/25/2024    TIBC 190 (L) 11/25/2024    FERRITIN 1,087 (H) 11/25/2024    , Folate: No results found for: \"FOLATE\"     Nutrition Specific Medications:  Scheduled medications  [Transfer Hold] acetaminophen, 975 mg, oral, q8h  [Transfer Hold] atorvastatin, 80 mg, oral, Nightly  B complex-vitamin C, 1 tablet, oral, Daily  [Transfer Hold] cholecalciferol, 400 Units, oral, Daily  [Transfer Hold] gabapentin, 300 mg, oral, TID  [Transfer Hold] piperacillin-tazobactam, 4.5 g, intravenous, q6h  [Transfer Hold] polyethylene glycol, 17 g, oral, Daily  [Transfer Hold] tamsulosin, 0.4 mg, oral, Nightly  [Transfer Hold] vancomycin, 1,000 mg, intravenous, q12h      Continuous medications     PRN medications  PRN medications: [Transfer Hold] alum-mag hydroxide-simeth, [Transfer Hold] oxyCODONE, [Transfer Hold] vancomycin      I/O:    ;      Dietary Orders (From admission, onward)       Start     Ordered    11/29/24 0001  NPO Diet; " Effective midnight  Diet effective midnight         11/28/24 0650    11/27/24 1922  May Participate in Room Service  ( ROOM SERVICE MAY PARTICIPATE)  Once        Question:  .  Answer:  Yes    11/27/24 1921                     Estimated Needs:   Total Energy Estimated Needs (kCal):  (2675 kcal)  Method for Estimating Needs: 30 kcal/kg IBW  Total Protein Estimated Needs (g):  (110-130 g)  Method for Estimating Needs: 1.25-1.5 g/kg IBW  Total Fluid Estimated Needs (mL):  (1 ml/kcal)           Nutrition Diagnosis   Malnutrition Diagnosis  Patient has Malnutrition Diagnosis: No    Nutrition Diagnosis  Patient has Nutrition Diagnosis: Yes  Diagnosis Status (1): New  Nutrition Diagnosis 1: Increased nutrient needs  Related to (1): Increased metabolic demand  As Evidenced by (1): multiple chronic diabetic ulcers and new BKA       Nutrition Interventions/Recommendations         Nutrition Prescription:  Individualized Nutrition Prescription Provided for : 2675 kcal, 110-130 g pro, consistent carb 75 gm/meal upon diet advancement  Pt with low iron levels, provide MVI with iron to supplement        Nutrition Interventions:   Interventions: Medical food supplement  Medical Food Supplement: Commercial beverage  Goal: Prostat AWC (100 kcal, 17 g pro) TID         Nutrition Education:   N/a       Nutrition Monitoring and Evaluation   Food/Nutrient Related History Monitoring  Monitoring and Evaluation Plan: Energy intake, Amount of food  Energy Intake: Estimated energy intake  Criteria: Consume >75% EER  Amount of Food: Estimated amout of food, Medical food intake  Criteria: Consume >75% meals and ONS    Body Composition/Growth/Weight History  Monitoring and Evaluation Plan: Weight change  Weight Change: Weight gain, Weight loss  Criteria: Maintain stable weight    Biochemical Data, Medical Tests and Procedures  Monitoring and Evaluation Plan: Electrolyte/renal panel, Glucose/endocrine profile, Nutritional anemia profile, Vitamin  profile  Criteria: WNL    Nutrition Focused Physical Findings  Monitoring and Evaluation Plan: Skin  Criteria: Wound healing         Time Spent (min): 25 minutes

## 2024-11-29 NOTE — PROGRESS NOTES
Transitional Care Coordination Progress Note:  PLAN PER MEDICAL TEAM: OR for BKA today.    PAYOR: [employer]     DISPO: Pending PT/OT postop. Patient previously had home care and would be in agreement to having home care again. ADOD next week.     SUPPORT/CONTACT: [employer], [phone]    Erika Duron RN, TCC

## 2024-11-29 NOTE — CARE PLAN
The patient's goals for the shift include      The clinical goals for the shift include Pt safety will be maintained    Problem: Fall/Injury  Goal: Not fall by end of shift  Outcome: Progressing  Goal: Be free from injury by end of the shift  Outcome: Progressing  Goal: Verbalize understanding of personal risk factors for fall in the hospital  Outcome: Progressing  Goal: Verbalize understanding of risk factor reduction measures to prevent injury from fall in the home  Outcome: Progressing  Goal: Use assistive devices by end of the shift  Outcome: Progressing  Goal: Pace activities to prevent fatigue by end of the shift  Outcome: Progressing     Problem: Pain  Goal: Takes deep breaths with improved pain control throughout the shift  Outcome: Progressing  Goal: Turns in bed with improved pain control throughout the shift  Outcome: Progressing  Goal: Walks with improved pain control throughout the shift  Outcome: Progressing  Goal: Performs ADL's with improved pain control throughout shift  Outcome: Progressing  Goal: Participates in PT with improved pain control throughout the shift  Outcome: Progressing  Goal: Free from opioid side effects throughout the shift  Outcome: Progressing  Goal: Free from acute confusion related to pain meds throughout the shift  Outcome: Progressing     Problem: Skin  Goal: Decreased wound size/increased tissue granulation at next dressing change  Outcome: Progressing  Goal: Participates in plan/prevention/treatment measures  Outcome: Progressing  Flowsheets (Taken 11/28/2024 1958)  Participates in plan/prevention/treatment measures: Elevate heels  Goal: Prevent/manage excess moisture  Outcome: Progressing  Goal: Prevent/minimize sheer/friction injuries  Outcome: Progressing  Goal: Promote/optimize nutrition  Outcome: Progressing  Goal: Promote skin healing  Outcome: Progressing

## 2024-11-29 NOTE — BRIEF OP NOTE
Date: 2024 - 2024  OR Location: [facility] OR    Name: Dustin Ace, : 1950, Age: 74 y.o., MRN: [id number], Sex: male    Diagnosis  Pre-op Diagnosis      * Osteomyelitis of right foot, unspecified type (Multi) [M86.9] Post-op Diagnosis     * Osteomyelitis of right foot, unspecified type (Multi) [M86.9]     Procedures  Leg Amputation Below Knee  [address]696 - AK AMP LEG THRU TIBFIB W/IMMT FITG TQ W/1ST CST      Surgeons      * Sis Gates - Primary    Resident/Fellow/Other Assistant:  Surgeons and Role:     * Piter Zuniga MD - Assisting    Staff:   Circulator: Alexandra Robertson Scrub: Raul  Circulator: Chin Cashub Person: Sean Mcintyre Person: Vandana Robertson Circulator: Sean    Anesthesia Staff: Anesthesiologist: Kamran Atkinson MD; Olga Chun MD  CRNA: PATY Walker-CRNA  Anesthesia Resident: Ami Orona MD    Procedure Summary  Anesthesia: General  ASA: ASA status not filed in the log.  Estimated Blood Loss: 20mL  Intra-op Medications: Administrations occurring from 0925 to 1155 on 24:  * No intraprocedure medications in log *           Anesthesia Record               Intraprocedure I/O Totals       None           Specimen:   ID Type Source Tests Collected by Time   1 : RIGHT FOOT AMPUTATION Tissue FOOT AMPUTATION RIGHT SURGICAL PATHOLOGY EXAM Sis Gates MD 2024 1254                  Findings: right ankle disarticulation    Complications:  None; patient tolerated the procedure well.     Disposition: PACU - hemodynamically stable.  Condition: stable  Specimens Collected:   ID Type Source Tests Collected by Time   1 : RIGHT FOOT AMPUTATION Tissue FOOT AMPUTATION RIGHT SURGICAL PATHOLOGY EXAM Sis Gates MD 2024 1254

## 2024-11-29 NOTE — PROGRESS NOTES
Occupational Therapy                 Therapy Communication Note    Patient Name: Dustin Ace  MRN: [id number]  Department: [facility]  Room: 6010/6010-A  Today's Date: 11/29/2024     Discipline: Occupational Therapy    Missed Visit Reason: Missed Visit Reason: Patient placed on medical hold (PATIENT PENDING OR FOR BKA; WILL DEFER OT UNTIL HE IS POST OP AND MEDICALLY APPROPRIATE.)    Missed Time: Attempt    Comment:

## 2024-11-29 NOTE — ANESTHESIA POSTPROCEDURE EVALUATION
Patient: Dustin Ace    Procedure Summary       Date: 11/29/24 Room / Location: [facility] OR 16 / [facility] OR    Anesthesia Start: 1223 Anesthesia Stop: 1337    Procedure: Leg Amputation Below Knee (Right) Diagnosis:       Osteomyelitis of right foot, unspecified type (Multi)      (Osteomyelitis of right foot, unspecified type (Multi) [M86.9])    Surgeons: Sis Gates MD Responsible Provider: Olga Chun MD    Anesthesia Type: general ASA Status: 3            Anesthesia Type: general    Vitals Value Taken Time   /67 11/29/24 1331   Temp 36.3 11/29/24 1338   Pulse 64 11/29/24 1335   Resp 6 11/29/24 1335   SpO2 100 % 11/29/24 1335   Vitals shown include unfiled device data.    Anesthesia Post Evaluation    Patient location during evaluation: PACU  Patient participation: complete - patient participated  Level of consciousness: awake and alert  Pain score: 0  Pain management: adequate  Airway patency: patent  Cardiovascular status: stable  Respiratory status: spontaneous ventilation and face mask  Hydration status: acceptable  Postoperative Nausea and Vomiting: none        No notable events documented.

## 2024-11-29 NOTE — ANESTHESIA PROCEDURE NOTES
Airway  Date/Time: 11/29/2024 12:36 PM  Urgency: elective    Airway not difficult    Staffing  Performed: CRNA   Authorized by: Kamran Atkinson MD    Performed by: PATY Walker-MARCO  Patient location during procedure: OR    Indications and Patient Condition  Indications for airway management: anesthesia  Spontaneous Ventilation: absent  Sedation level: deep  Preoxygenated: yes  Patient position: sniffing  Mask difficulty assessment: 1 - vent by mask  Planned trial extubation    Final Airway Details  Final airway type: endotracheal airway      Successful airway: ETT  Cuffed: yes   Successful intubation technique: direct laryngoscopy  Facilitating devices/methods: intubating stylet and anterior pressure/BURP  Endotracheal tube insertion site: oral  Blade: Wanda  Blade size: #4  ETT size (mm): 7.5  Cormack-Lehane Classification: grade IIa - partial view of glottis  Placement verified by: capnometry and palpation of cuff   Measured from: lips  ETT to lips (cm): 22  Number of attempts at approach: 1

## 2024-11-29 NOTE — SIGNIFICANT EVENT
Vascular Surgery  Postoperative Check Note    Subjective  Dustin Ace is a 74 y.o. male who is now POD0 s/p R ankle disarticulation. Postoperatively, patient feels well, and denies fevers/chills, n/v, chest pain, shortness of breath. Feels his pain is well-controlled and appropriate for this time. Has no other concerns.    Objective  Vitals:  Visit Vitals  /71   Pulse 64   Temp 36.2 °C (97.2 °F)   Resp 12       Physical Exam:  GEN: No acute distress. Alert, awake and conversive.  RESP: Breathing non-labored, equal chest rise. On RA  CV: Regular rate, normotensive  GI: Abdomen soft, nondistended, nontender.   : Voiding spontaneously.  MSK: No gross deformities. Moves all extremities spontaneously.  NEURO: Alert and oriented x3. No focal deficits.  PSYCH: Appropriate mood and affect.  SKIN: ACE wrap dressings in place over RLE incision, without strikethrough.        Assessment  Dustin Ace is a 74 y.o. male who is now POD0 s/p ankle disarticulation.  Patient is in stable condition, appropriate for postoperative course. The plan is as follows:    - Will continue to optimize pain management, current pain regimen   - Regular diet   - PO fluids     Stevo Diaz MD  PGY-1 General Surgery

## 2024-11-29 NOTE — PROGRESS NOTES
Dustin Ace is a 74 y.o. male with a history of BPH, centrilobular emphysema, chronic hepatitis C (treated), CVA, diabetes mellitus, hypertension, PAD, history of Charcot deformity, chronic right foot osteomyelitis, paroxysmal atrial fibrillation presenting for a chronic R foot wound.       Subjective   NAEO. Patient seen at bedside this morning. Scheduled for OR today for R BKA. He denies fevers/chills, shortness of breath, chest pain, n/v, abdominal pain, diarrhea, constipation, dysuria.       Objective     Last Recorded Vitals  /65 (BP Location: Right arm, Patient Position: Lying)   Pulse 65   Temp 36.3 °C (97.3 °F) (Temporal)   Resp 16   Wt 90.7 kg (200 lb)   SpO2 99%   Intake/Output last 3 Shifts:    Intake/Output Summary (Last 24 hours) at 11/29/2024 0644  Last data filed at 11/28/2024 2349  Gross per 24 hour   Intake 1000 ml   Output 960 ml   Net 40 ml       Admission Weight  Weight: 90.7 kg (200 lb) (11/25/24 1123)    Daily Weight  11/25/24 : 90.7 kg (200 lb)    Image Results  CT angio coronary art with heartflow if score >30%  Narrative: Interpreted By:  Hetal Jorgensen,   STUDY:  CT ANGIO CORONARY ART WITH HEARTFLOW IF SCORE >30%;  11/27/2024 2:57  pm      INDICATION:  Signs/Symptoms:surgical risk stratification.  .  There is need to  define coronary anatomy.          COMPARISON:  None.      ACCESSION NUMBER(S):  [id number]      ORDERING CLINICIAN:  ELIZABETH RODRIGUES      TECHNIQUE:  Using multi-detector CT technology,  axial, sequential imaging with  prospective gating was performed of the chest following the  intravenous administration of 90 ML Omnipaque 350.      The patient was premedicated with 0.8 mg sublingual nitroglycerin for  coronary dilation, respectively.      For optimization of anatomic evaluation, multiplanar reconstruction,  maximum intensity projections, and advanced 3-D off-line  postprocessing were performed on a dedicated stand-alone workstation  under the direct  supervision of the interpreting physician.      FINDINGS:  POTENTIAL STUDY LIMITATIONS:  Study is significantly limited due to  motion artifact..      CORONARY ARTERIES:      Coronary artery calcium score:  LM: 0.2  LAD: 926.7  CX: 748.8  RCA: 505.8      Total: 2181.5      CORONARY ANATOMY:  There is normal origin of the coronary arteries.      LEFT MAIN CORONARY ARTERY:  The left main is normal sized vessel that  bifurcates into the LAD  and circumflex. There is no significant atherosclerotic change or  stenotic disease.      LEFT ANTERIOR DESCENDING ARTERY:  The LAD is a normal size vessel that  wraps around the apex.  It gives rise to  4 acute diagonal branches.  Multiple calcified plaques are identified in the proximal and mid LAD  Dominant area of stenosis is approximately 2.3 cm from the origin of  LAD and causing approximately 70% stenosis.      LEFT CIRCUMFLEX ARTERY:  The LCX is a normal size vessel, which is  non-dominant.  It gives rise to  3 obtuse marginal branches.  Multiple calcified plaques are identified in the proximal and mid LAD  causing 50-70% stenosis.      RAMUS INTERMEDIUS:  Not present      RIGHT CORONARY ARTERY:  The RCA is a normal size vessel, which is  dominant .  It gives rise to a  conus branch,  cameron branch, and  2 acute  marginal branches.  In its distal segment it bifurcates into the PDA  and PV branch. Multiple calcified plaques are identified in the  proximal, mid and distal RCA. Unable to assess the degree of stenosis  due to significant motion.      CARDIAC CHAMBERS:  The cardiac chambers demonstrate normal atrioventricular and  ventriculoarterial concordance, and systemic and pulmonary venous  return.      LEFT ATRIUM:  Dilated (31.3-cm2)      RIGHT ATRIUM:  Normal size (20.3-cm2)      INTERATRIAL SEPTUM:  Intact.          LEFT VENTRICLE:  Normal size (4.3-cm)      RIGHT VENTRICLE:  Normal size (3.8-cm)      AORTIC VALVE:  The aortic valve is  trileaflet in morphology.  There  is  calcification the aortic valve.      MITRAL VALVE:  No thickening/calcification.      THORACIC AORTA:  The visualized thoracic aorta is normal in course, caliber, and  contour. There is no acute aortic pathology, such as dissection,  intramural hematoma, or contained rupture. The aortic arch is not  included on this examination.      PERICARDIUM:  There is no pericardial effusion of thickening.      CHEST:  The chest wall is normal.  No significant lymphadenopathy or mass is seen in limited images of  the mediastinum. Limited imaging through the lungs reveals no gross  abnormalities. No pleural effusion or pneumothorax.  Partially imaged presumed lipoma along the left lateral chest  wall/pleural lining was also seen 10/25/2021. Assessment for  stability in size is not possible as it was partially imaged on the  prior study and in the current study.      UPPER ABDOMEN:  Limited imaging through the upper abdomen reveals no abnormalities of  the visualized organs.      Impression: 1. Study is significantly limited due to motion artifact.  2. Multiple calcified plaques are identified in the proximal and mid  LAD. Dominant focus of stenosis located approximately 2.3 cm from the  origin of LAD and causing approximately 70% stenosis.  3. Multiple calcified plaques are identified in the proximal, mid and  distal RCA. Unable to assess the degree of stenosis due to  significant motion.  4. Multiple calcified plaques are identified in the proximal and mid  LAD causing 50-70% stenosis.  5. Normal coronary artery anatomy with right dominant system.  6. Total coronary artery calcium score 2181.5  7. Dilated left atrium.  8. Partially imaged presumed lipoma along the left lateral chest  wall/pleural lining was also seen 10/25/2021. Assessment for  stability in size is not possible as it was partially imaged on the  prior study and in the current study.          MACRO:  None      Signed by: Hetal Yap 11/27/2024  4:13 PM  Dictation workstation:   [id number]  Transthoracic Echo (TTE) Complete     [facility] (formerly [facility]3], [address] [place] [address]                 Tel [phone] and Fax [phone]    TRANSTHORACIC ECHOCARDIOGRAM REPORT       Patient Name:       KRUPA JOHNSON        Reading Physician:    [id number] Amadou Perez MD  Study Date:         11/27/2024          Ordering Provider:    [id number] RODRIGUES  MRN/PID:            [id number]            Fellow:  Accession#:         [id number]        Nurse:                Savanah Berrios RN  Date of Birth/Age:  1950 / 74      Sonographer:          Carley Martinez RCS                      years  Gender assigned at  M                   Additional Staff:  Birth:  Height:             190.00 cm           Admit Date:  Weight:             90.72 kg            Admission Status:     Inpatient - STAT  BSA / BMI:          2.19 m2 / 25.13     Encounter#:           [id number]                      kg/m2  Blood Pressure:     116/71 mmHg         Department Location:  [facility]                                                                Non Invasive    Study Type:    TRANSTHORACIC ECHO (TTE) COMPLETE  Diagnosis/ICD: Encounter for other preprocedural examination-Z01.818  Indication:    Pre-procedural  CPT Code:      Echo Complete w Full Doppler-11652    Patient History:  Pertinent History: CVA and PVD.    Study Detail: The following Echo studies were performed: 2D, M-Mode, Doppler and                color flow. Technically challenging study due to prominent lung                artifact. Agitated saline used as a contrast agent for intraseptal                flow evaluation.       PHYSICIAN INTERPRETATION:  Left Ventricle: Left ventricular ejection fraction is normal, calculated by Appiah's biplane at 57%. The patient is in atrial flutter which  may influence the estimate of left ventricular function and transvalvular flows. There is mild concentric left ventricular hypertrophy. There are no regional left ventricular wall motion abnormalities. The left ventricular cavity size is normal. Left ventricular diastolic filling cannot be determined, due to atrial fibrillation/flutter.  Left Atrium: The left atrium is mildly dilated. There is no evidence of a patent foramen ovale. A bubble study using agitated saline was performed. Bubble study is negative.  Right Ventricle: The right ventricle is normal in size. There is normal right ventricular global systolic function.  Right Atrium: The right atrium is upper limits of normal in size.  Aortic Valve: The aortic valve is probably trileaflet. There is moderate aortic valve cusp calcification. There is reduced aortic valve non coronary cusp excursion. There is trace aortic valve regurgitation. The peak instantaneous gradient of the aortic valve is 5 mmHg.  Mitral Valve: The mitral valve is normal in structure. There is mild mitral valve regurgitation.  Tricuspid Valve: The tricuspid valve is structurally normal. There is trace to mild tricuspid regurgitation.  Pulmonic Valve: The pulmonic valve is structurally normal. There is trace pulmonic valve regurgitation.  Pericardium: There is no pericardial effusion noted. There is an anterior clear space.  Aorta: The aortic root is normal. The Ao Sinus is 3.50 cm. The Asc Ao is 3.40 cm. There is upper limits of normal dilatation of the ascending aorta. There is no dilatation of the aortic root.  Pulmonary Artery: The tricuspid regurgitant velocity is 2.51 m/s, and with an estimated right atrial pressure of 3 mmHg, the estimated pulmonary artery pressure is normal with the RVSP at 28.2 mmHg.  Systemic Veins: The inferior vena cava appears normal in size.       CONCLUSIONS:   1. The patient is in atrial flutter which may influence the estimate of left ventricular function  and transvalvular flows.   2. Left ventricular ejection fraction is normal, calculated by Appiah's biplane at 57%.   3. Left ventricular diastolic filling cannot be determined, due to atrial fibrillation/flutter.   4. There is normal right ventricular global systolic function.   5. The left atrium is mildly dilated.   6. There is moderate aortic valve cusp calcification with reduced excursion (non-coronary cusp).   7. There is no evidence of a patent foramen ovale.    RECOMMENDATIONS:  Utilizing an FDA cleared automated machine learning algorithm (EchoGo Heart Failure by "[employer]), the analysis of the apical 4-chamber echocardiogram suggests the presence of heart failure with preserved ejection fraction (HFpEF)*. Clinical correlation looking for additional heart failure signs and symptoms is recommended, as a definite diagnosis of heart failure cannot be made by imaging alone.  *Per ACC/AHA/HFSA universal diagnosis of heart failure, HFpEF is defined as 1) signs and symptoms leading to clinical diagnosis of heart failure, 2) an ejection fraction of at least 50%, and 3) evidence of elevated intra-cardiac filling pressures by echocardiography, BNP elevation, or catheterization.     QUANTITATIVE DATA SUMMARY:     2D MEASUREMENTS:          Normal Ranges:  Ao Root d:       3.50 cm  (2.0-3.7cm)  LAs:             4.10 cm  (2.7-4.0cm)  IVSd:            1.50 cm  (0.6-1.1cm)  LVPWd:           1.20 cm  (0.6-1.1cm)  LVIDd:           4.20 cm  (3.9-5.9cm)  LVIDs:           2.90 cm  LV Mass Index:   97 g/m2  LVEDV Index:     37 ml/m2  LV % FS          31.0 %       LA VOLUME:                    Normal Ranges:  LA Vol A4C:        91.6 ml    (22+/-6mL/m2)  LA Vol A2C:        90.1 ml  LA Vol BP:         90.9 ml  LA Vol Index A4C:  41.8ml/m2  LA Vol Index A2C:  41.2 ml/m2  LA Vol Index BP:   41.5 ml/m2  LA Area A4C:       25.0 cm2  LA Area A2C:       24.8 cm2  LA Major Axis A4C: 5.8 cm  LA Major Axis A2C: 5.8 cm  LA Volume Index:    41.5 ml/m2       RA VOLUME BY A/L METHOD:            Normal Ranges:  RA Vol A4C:              47.0 ml    (8.3-19.5ml)  RA Vol Index A4C:        21.5 ml/m2  RA Area A4C:             17.6 cm2  RA Major Axis A4C:       5.6 cm       AORTA MEASUREMENTS:         Normal Ranges:  Ao Sinus, d:        3.50 cm (2.1-3.5cm)  Asc Ao, d:          3.40 cm (2.1-3.4cm)       LV SYSTOLIC FUNCTION BY 2D PLANIMETRY (MOD):                       Normal Ranges:  EF-A4C View:    59 % (>=55%)  EF-A2C View:    56 %  EF-Biplane:     57 %  LV EF Reported: 57 %       LV DIASTOLIC FUNCTION:            Normal Ranges:  MV Peak E:             1.02 m/s   (0.7-1.2 m/s)  MV Peak A:             0.24 m/s   (0.42-0.7 m/s)  E/A Ratio:             4.25       (1.0-2.2)  MV e'                  0.100 m/s  (>8.0)  MV lateral e'          0.12 m/s  MV medial e'           0.07 m/s  E/e' Ratio:            10.23      (<8.0)  PulmV Sys Boyd:         64.70 cm/s  PulmV Bauer Boyd:        22.50 cm/s  PulmV S/D Boyd:         2.90       MITRAL VALVE:          Normal Ranges:  MV DT:        176 msec (150-240msec)       AORTIC VALVE:           Normal Ranges:  AoV Vmax:      1.13 m/s (<=1.7m/s)  AoV Peak P.1 mmHg (<20mmHg)  LVOT Max Boyd:  0.92 m/s (<=1.1m/s)  LVOT VTI:      18.60 cm  LVOT Diameter: 2.00 cm  (1.8-2.4cm)  AoV Area,Vmax: 2.56 cm2 (2.5-4.5cm2)       RIGHT VENTRICLE:  RV Basal 3.70 cm  RV Mid   2.70 cm  RV Major 7.3 cm  TAPSE:   20.3 mm  RV s'    0.13 m/s       TRICUSPID VALVE/RVSP:          Normal Ranges:  Peak TR Velocity:     2.51 m/s  Est. RA Pressure:     3 mmHg  RV Syst Pressure:     28 mmHg  (< 30mmHg)  IVC Diam:             1.70 cm       PULMONIC VALVE:          Normal Ranges:  PV Accel Time:  113 msec (>120ms)  PV Max Boyd:     0.8 m/s  (0.6-0.9m/s)  PV Max P.6 mmHg       Pulmonary Veins:  PulmV Bauer Boyd: 22.50 cm/s  PulmV S/D Boyd:  2.90  PulmV Sys Boyd:  64.70 cm/s       AORTA:  Asc Ao Diam 3.47 cm       [id number] Amadou Perez MD  Electronically  signed on 11/27/2024 at 1:10:26 PM       ** Final **      Physical Exam  Constitutional:       General: He is not in acute distress.     Appearance: He is not ill-appearing.   HENT:      Head: Normocephalic and atraumatic.      Mouth/Throat:      Mouth: Mucous membranes are moist.   Eyes:      Extraocular Movements: Extraocular movements intact.      Pupils: Pupils are equal, round, and reactive to light.      Comments: Prosthetic left eye   Cardiovascular:      Rate and Rhythm: Normal rate and regular rhythm.      Heart sounds: Normal heart sounds.   Pulmonary:      Effort: Pulmonary effort is normal.      Breath sounds: Normal breath sounds.   Abdominal:      General: Abdomen is flat.      Palpations: Abdomen is soft.   Musculoskeletal:      Right lower leg: Edema present.      Left lower leg: No edema.   Feet:      Comments: Left hallux amputation with dry skin. Appears intact. Noted callus under the first MTP. Right side: lateral shin with a 3 cm wound with a pink dermis without purulence. Soft tissue defect in the middle foot. Appears to have depth of approximately 1 cm. Yellow pus coming out. Edema in the R lower extremity greater than the L.     See media tab.  Neurological:      Mental Status: He is alert and oriented to person, place, and time.         Relevant Results               Assessment/Plan      Assessment & Plan  Osteomyelitis of right foot, unspecified type (Multi)    HTN (hypertension)    Paroxysmal atrial fibrillation (Multi)    JOSE (obstructive sleep apnea)    CVA (cerebral vascular accident) (Multi)    Pulmonary emphysema (Multi)    Dustin Ace is a 74 y.o. male with a history of BPH, centrilobular emphysema, chronic hepatitis C (treated), CVA, diabetes mellitus, hypertension, PAD, history of Charcot deformity, chronic right foot osteomyelitis, paroxysmal atrial fibrillation presenting for a chronic R foot wound. Patient met SIRS criteria on admission. Started on vanc and zosyn and got 500mL  bolus, vitals now improved. MRI confirmed R foot OM.     Updates 11/29:  -To OR today for R BKA    Pre-Op Risk Assessment:  Labs :   - CBC, RFP, EKG, CXR, Coags, T&S  - Mild leukocytosis upon presentation, now resolved. Anemia of chronic disease with hgb stable between 8 to 10. ALT mildly elevated at 59 and t.bili 1.3 on 11/25. No renal dysfunction. On apixaban at home with coags on 11/25 showing INR 2.1 and PT 24.2. Most recent INR 1.1 on 11/28.  - Met SIRS criteria upon admission but started on broad spectrum antibiotics and he quickly improved. Vital signs stable while inpatient. On amlodipine at home, currently held given he is normotensive.  General Risk Assessment:   - History of stroke, PAD, paroxysmal afib, centrilobular emphysema, JOSE  - No history of CAD, CHF  - Patient has lifestyle controlled T2DM, not on Insulin at home, currently not requiring any while inpatient  - Apixaban held with low intensity heparin drip pending surgery, will stop morning of surgery  Cardiac Risk Assessment:   -Cardiology consulted for risk stratification per vascular surgery request: TTE showed normal EF. CTA Coronary showed 50-70% in multiple coronary arteries. Moderate risk for surgery. No plan for LHC pre-operatively.    #Chronic infected right leg wound s/p multiple I&D's  #SIRS secondary to infected wound (resolved)  #Osteomyelitis  #Peripheral vascular disease  #Charcot deformity and neuropathy  - CRP: 16.25, ESR: 96  - SIRS criteria with source of infection, though no signs of end-organ damage currently, vitals improved with vanc, zosyn, and IVF bolus  - MRI showed OM in R foot  - Patient seen by podiatry, deemed R foot unsalvageable and recommended BKA  - ARMANDO showed mild disease in RLE, none in LLE    Cultures:  BCx (11/25): NGTD  Wound cultures (11/25): Pseudomonas and Strep gordonii     Antibiotics:  Vancomycin (11/25 - present)  Zosyn (11/25 - present)     Plan:  - Continue with vancomycin and Zosyn   - Continue with  gabapentin  - PT/OT  - Tylenol 975 mg q8h PRN.   - Patient started on heparin drip to bridge to surgery while his home eliquis is being held  - Cardiology consulted for risk stratification per vascular surgery request  -To OR for R BKA with vascular surgery 11/29     #Hyperbilirubinemia  #Elevated ALT  - Noted to have a total bilirubin of 1.3. Direct bilirubin of 0.5.   - ALT elevated to 59  - Unclear etiology. Previously had a history of chronic hepatitis C that was treated.   - Elevated PT/INR, normal aPTT. On apixaban at home  Plan:  - Continue to monitor     #Diabetes mellitus  - Blood sugar in the 200s on BMP, may be secondary to stress hyperglycemia  Plan:  - POC glucose checks, will add sliding scale insulin if needed     #HTN  - Hold amlodipine in the setting of possible sepsis. Reintroduce as appropriate     #Paroxysmal Afib  - Hold apixaban for possible procedure/surgical intervention.   -Low intensity therapeutic heparin drip    #HLD  -continue home atorvastatin     #Anemia of chronic disease  - Baseline of approximately 12-13  - Presented at 9.0. Normocytic with elevated RDW  - Iron 25 (low), TIBC (low), UIBC 165, 13% sat, ferritin of 1,087, suggestive of anemia of chronic disease  - Retic absolute: 0.086, retic %: 2.7%. RPI <2, indicates inadequate response  Plan:   - Treat underlying infection     #Insomnia  #Anxiety  - Patient reports taking it approximately once per month  - Hold diazepam. Will give anxiolytic if needed     #BPH  - Continue tamsulosin     #MISC  - Continue B-complex with vitamin C  - Continue vitamin D  - Continue Maalox     F: PRN  E: PRN  N: regular diet  A: PIV  Bowel regimen: Miralax     DVT ppx: Holding home apixaban, heparin drip stopped for surgery  GI ppx: None    Code Status: Full code (confirmed on admission)  Surrogate Medical Decision-maker: Coco Finley (daughter): [phone]; Yanet Oropeza (son): [phone]            Chin Daniel MD  PGY-1

## 2024-11-29 NOTE — HOSPITAL COURSE
Dustin Ace is a 74 y.o. male with a history of BPH, centrilobular emphysema, chronic hepatitis C (treated), CVA, diabetes mellitus, hypertension, PAD, history of Charcot deformity, chronic right foot osteomyelitis, paroxysmal atrial fibrillation who presented for his chronic R foot wound on 11/25 after 1 week of fever and chills at home and purulent drainage from his foot. Upon arrival to the ED he was febrile, tachycardic, and had a mild leukocytosis so was started on vancomycin and zosyn and IVF with improvement in vital signs. MRI confirmed R foot OM. He was evaluated by podiatry who determined the right foot to be unsalvageable and recommended BKA. Vascular surgery was consulted and took the patient for R BKA on 11/29 and then formalization of the BKA on 12/2. Source control was obtained so antibiotics were stopped on 12/3. His eliquis for afib was held during his hospitalization and he was started on a heparin drip. He developed a mild leukocytosis after the formalization on 12/2, likely reactive in the setting of surgery, which subsequently downtrended. He was discharged to SNF and resumed on his home eliquis.

## 2024-11-29 NOTE — SIGNIFICANT EVENT
Vascular Surgery Post-Operative Plan    S/p right ankle disarticulation        Plan:  2 hours PACU stay for NV checks  Return to floor  Okay to resume heparin drip at 4pm weight based no initial bolus  Recommend a multimodal pain regimen:  Tylenol 975 mg Q8H scheduled  Gabapentin 300 mg Q8H scheduled - titrate up dose if needed  Robaxin 500 mg Q6H scheduled - titrate up dose if needed  PRN oxycodone for breakthrough pain  diet as tolerated after 4 hours  Continue systemic antibiotics   PT tomorrow morning  Wound: right ankle disarticulation:  Starting tomorrow morning BID dressing changes: betadine soaked kerlex, dry kerlex, ACE, knee immobilizer    Piter Zuniga MD  Vascular Surgery Fellow  Team Pager [phone]  11/29/24  1:17 PM

## 2024-11-30 ENCOUNTER — APPOINTMENT (OUTPATIENT)
Dept: RADIOLOGY | Facility: HOSPITAL | Age: 74
End: 2024-11-30
Payer: MEDICARE

## 2024-11-30 LAB
ABO GROUP (TYPE) IN BLOOD: NORMAL
ABO GROUP (TYPE) IN BLOOD: NORMAL
ALBUMIN SERPL BCP-MCNC: 3 G/DL (ref 3.4–5)
ANION GAP SERPL CALC-SCNC: 13 MMOL/L (ref 10–20)
ANTIBODY SCREEN: NORMAL
ANTIBODY SCREEN: NORMAL
BASOPHILS # BLD AUTO: 0.03 X10*3/UL (ref 0–0.1)
BASOPHILS # BLD AUTO: 0.03 X10*3/UL (ref 0–0.1)
BASOPHILS NFR BLD AUTO: 0.3 %
BASOPHILS NFR BLD AUTO: 0.4 %
BUN SERPL-MCNC: 9 MG/DL (ref 6–23)
CALCIUM SERPL-MCNC: 8.6 MG/DL (ref 8.6–10.6)
CHLORIDE SERPL-SCNC: 106 MMOL/L (ref 98–107)
CO2 SERPL-SCNC: 24 MMOL/L (ref 21–32)
CREAT SERPL-MCNC: 0.73 MG/DL (ref 0.5–1.3)
EGFRCR SERPLBLD CKD-EPI 2021: >90 ML/MIN/1.73M*2
EOSINOPHIL # BLD AUTO: 0.14 X10*3/UL (ref 0–0.4)
EOSINOPHIL # BLD AUTO: 0.15 X10*3/UL (ref 0–0.4)
EOSINOPHIL NFR BLD AUTO: 1.4 %
EOSINOPHIL NFR BLD AUTO: 2 %
ERYTHROCYTE [DISTWIDTH] IN BLOOD BY AUTOMATED COUNT: 17 % (ref 11.5–14.5)
ERYTHROCYTE [DISTWIDTH] IN BLOOD BY AUTOMATED COUNT: 17.2 % (ref 11.5–14.5)
GLUCOSE BLD MANUAL STRIP-MCNC: 131 MG/DL (ref 74–99)
GLUCOSE BLD MANUAL STRIP-MCNC: 133 MG/DL (ref 74–99)
GLUCOSE BLD MANUAL STRIP-MCNC: 161 MG/DL (ref 74–99)
GLUCOSE BLD MANUAL STRIP-MCNC: 188 MG/DL (ref 74–99)
GLUCOSE SERPL-MCNC: 120 MG/DL (ref 74–99)
HCT VFR BLD AUTO: 29.2 % (ref 41–52)
HCT VFR BLD AUTO: 29.5 % (ref 41–52)
HGB BLD-MCNC: 9.2 G/DL (ref 13.5–17.5)
HGB BLD-MCNC: 9.4 G/DL (ref 13.5–17.5)
IMM GRANULOCYTES # BLD AUTO: 0.03 X10*3/UL (ref 0–0.5)
IMM GRANULOCYTES # BLD AUTO: 0.06 X10*3/UL (ref 0–0.5)
IMM GRANULOCYTES NFR BLD AUTO: 0.4 % (ref 0–0.9)
IMM GRANULOCYTES NFR BLD AUTO: 0.6 % (ref 0–0.9)
LYMPHOCYTES # BLD AUTO: 2.5 X10*3/UL (ref 0.8–3)
LYMPHOCYTES # BLD AUTO: 2.69 X10*3/UL (ref 0.8–3)
LYMPHOCYTES NFR BLD AUTO: 24.9 %
LYMPHOCYTES NFR BLD AUTO: 36.2 %
MAGNESIUM SERPL-MCNC: 1.97 MG/DL (ref 1.6–2.4)
MCH RBC QN AUTO: 27.6 PG (ref 26–34)
MCH RBC QN AUTO: 28.4 PG (ref 26–34)
MCHC RBC AUTO-ENTMCNC: 31.5 G/DL (ref 32–36)
MCHC RBC AUTO-ENTMCNC: 31.9 G/DL (ref 32–36)
MCV RBC AUTO: 88 FL (ref 80–100)
MCV RBC AUTO: 89 FL (ref 80–100)
MONOCYTES # BLD AUTO: 0.76 X10*3/UL (ref 0.05–0.8)
MONOCYTES # BLD AUTO: 0.95 X10*3/UL (ref 0.05–0.8)
MONOCYTES NFR BLD AUTO: 10.2 %
MONOCYTES NFR BLD AUTO: 9.4 %
NEUTROPHILS # BLD AUTO: 3.78 X10*3/UL (ref 1.6–5.5)
NEUTROPHILS # BLD AUTO: 6.38 X10*3/UL (ref 1.6–5.5)
NEUTROPHILS NFR BLD AUTO: 50.8 %
NEUTROPHILS NFR BLD AUTO: 63.4 %
NRBC BLD-RTO: 0 /100 WBCS (ref 0–0)
NRBC BLD-RTO: 0 /100 WBCS (ref 0–0)
PHOSPHATE SERPL-MCNC: 2.9 MG/DL (ref 2.5–4.9)
PLATELET # BLD AUTO: 398 X10*3/UL (ref 150–450)
PLATELET # BLD AUTO: 400 X10*3/UL (ref 150–450)
POTASSIUM SERPL-SCNC: 3.9 MMOL/L (ref 3.5–5.3)
RBC # BLD AUTO: 3.31 X10*6/UL (ref 4.5–5.9)
RBC # BLD AUTO: 3.33 X10*6/UL (ref 4.5–5.9)
RH FACTOR (ANTIGEN D): NORMAL
RH FACTOR (ANTIGEN D): NORMAL
SODIUM SERPL-SCNC: 139 MMOL/L (ref 136–145)
UFH PPP CHRO-ACNC: 0.1 IU/ML
UFH PPP CHRO-ACNC: 0.2 IU/ML
UFH PPP CHRO-ACNC: 0.2 IU/ML
UFH PPP CHRO-ACNC: <0.1 IU/ML
WBC # BLD AUTO: 10.1 X10*3/UL (ref 4.4–11.3)
WBC # BLD AUTO: 7.4 X10*3/UL (ref 4.4–11.3)

## 2024-11-30 PROCEDURE — 85025 COMPLETE CBC W/AUTO DIFF WBC: CPT

## 2024-11-30 PROCEDURE — 82947 ASSAY GLUCOSE BLOOD QUANT: CPT

## 2024-11-30 PROCEDURE — 2500000001 HC RX 250 WO HCPCS SELF ADMINISTERED DRUGS (ALT 637 FOR MEDICARE OP)

## 2024-11-30 PROCEDURE — 99233 SBSQ HOSP IP/OBS HIGH 50: CPT | Performed by: INTERNAL MEDICINE

## 2024-11-30 PROCEDURE — C1751 CATH, INF, PER/CENT/MIDLINE: HCPCS

## 2024-11-30 PROCEDURE — 36415 COLL VENOUS BLD VENIPUNCTURE: CPT

## 2024-11-30 PROCEDURE — 1100000001 HC PRIVATE ROOM DAILY

## 2024-11-30 PROCEDURE — 86850 RBC ANTIBODY SCREEN: CPT

## 2024-11-30 PROCEDURE — 2500000002 HC RX 250 W HCPCS SELF ADMINISTERED DRUGS (ALT 637 FOR MEDICARE OP, ALT 636 FOR OP/ED)

## 2024-11-30 PROCEDURE — 83735 ASSAY OF MAGNESIUM: CPT

## 2024-11-30 PROCEDURE — 85520 HEPARIN ASSAY: CPT

## 2024-11-30 PROCEDURE — 80069 RENAL FUNCTION PANEL: CPT

## 2024-11-30 PROCEDURE — 2500000004 HC RX 250 GENERAL PHARMACY W/ HCPCS (ALT 636 FOR OP/ED)

## 2024-11-30 PROCEDURE — 2780000003 HC OR 278 NO HCPCS

## 2024-11-30 PROCEDURE — 36410 VNPNXR 3YR/> PHY/QHP DX/THER: CPT

## 2024-11-30 RX ORDER — SODIUM CHLORIDE 0.9 % (FLUSH) 0.9 %
10 SYRINGE (ML) INJECTION AS NEEDED
Status: DISCONTINUED | OUTPATIENT
Start: 2024-11-30 | End: 2024-12-07 | Stop reason: HOSPADM

## 2024-11-30 RX ORDER — VANCOMYCIN HYDROCHLORIDE
1250 EVERY 12 HOURS
Status: DISCONTINUED | OUTPATIENT
Start: 2024-11-30 | End: 2024-12-03

## 2024-11-30 RX ORDER — VANCOMYCIN HYDROCHLORIDE 1 G/20ML
INJECTION, POWDER, LYOPHILIZED, FOR SOLUTION INTRAVENOUS DAILY PRN
Status: DISCONTINUED | OUTPATIENT
Start: 2024-11-30 | End: 2024-12-03

## 2024-11-30 RX ORDER — LIDOCAINE HYDROCHLORIDE 10 MG/ML
5 INJECTION, SOLUTION EPIDURAL; INFILTRATION; INTRACAUDAL; PERINEURAL ONCE
Status: DISCONTINUED | OUTPATIENT
Start: 2024-11-30 | End: 2024-12-02

## 2024-11-30 ASSESSMENT — PAIN DESCRIPTION - ORIENTATION
ORIENTATION: RIGHT

## 2024-11-30 ASSESSMENT — PAIN SCALES - GENERAL
PAINLEVEL_OUTOF10: 9
PAINLEVEL_OUTOF10: 7
PAINLEVEL_OUTOF10: 5 - MODERATE PAIN
PAINLEVEL_OUTOF10: 0 - NO PAIN
PAINLEVEL_OUTOF10: 7

## 2024-11-30 ASSESSMENT — PAIN SCALES - PAIN ASSESSMENT IN ADVANCED DEMENTIA (PAINAD): TOTALSCORE: MEDICATION (SEE MAR)

## 2024-11-30 ASSESSMENT — PAIN DESCRIPTION - LOCATION
LOCATION: FOOT

## 2024-11-30 ASSESSMENT — PAIN - FUNCTIONAL ASSESSMENT: PAIN_FUNCTIONAL_ASSESSMENT: 0-10

## 2024-11-30 NOTE — CARE PLAN
The patient's goals for the shift include      The clinical goals for the shift include Patient will remain safe and without falls or injury during shift      Problem: Fall/Injury  Goal: Be free from injury by end of the shift  Outcome: Progressing     Problem: Pain  Goal: Turns in bed with improved pain control throughout the shift  Outcome: Progressing  Goal: Participates in PT with improved pain control throughout the shift  Outcome: Progressing     Problem: Skin  Goal: Decreased wound size/increased tissue granulation at next dressing change  Outcome: Progressing  Flowsheets (Taken 11/30/2024 1818)  Decreased wound size/increased tissue granulation at next dressing change:   Promote sleep for wound healing   Protective dressings over bony prominences  Goal: Participates in plan/prevention/treatment measures  Outcome: Progressing  Flowsheets (Taken 11/30/2024 1818)  Participates in plan/prevention/treatment measures: Elevate heels  Goal: Prevent/manage excess moisture  Outcome: Progressing  Flowsheets (Taken 11/30/2024 1818)  Prevent/manage excess moisture:   Cleanse incontinence/protect with barrier cream   Monitor for/manage infection if present  Goal: Prevent/minimize sheer/friction injuries  Outcome: Progressing  Flowsheets (Taken 11/30/2024 1818)  Prevent/minimize sheer/friction injuries: HOB 30 degrees or less  Goal: Promote/optimize nutrition  Outcome: Progressing  Flowsheets (Taken 11/30/2024 1818)  Promote/optimize nutrition: Consume > 50% meals/supplements  Goal: Promote skin healing  Outcome: Progressing  Flowsheets (Taken 11/30/2024 1818)  Promote skin healing:   Protective dressings over bony prominences   Assess skin/pad under line(s)/device(s)

## 2024-11-30 NOTE — ED PROCEDURE NOTE
Procedure  Critical Care    Performed by: Mary Carmen Catalan MD  Authorized by: Mary Carmen Catalan MD    Critical care provider statement:     Critical care time (minutes):  35    Critical care time was exclusive of:  Separately billable procedures and treating other patients and teaching time    Critical care was necessary to treat or prevent imminent or life-threatening deterioration of the following conditions:  Sepsis    Critical care was time spent personally by me on the following activities:  Ordering and performing treatments and interventions, development of treatment plan with patient or surrogate, ordering and review of laboratory studies, ordering and review of radiographic studies, discussions with consultants, pulse oximetry, evaluation of patient's response to treatment, re-evaluation of patient's condition, examination of patient and review of old charts    Care discussed with: admitting provider                 Mary Carmen Catalan MD  11/30/24 1007

## 2024-11-30 NOTE — CONSULTS
Vancomycin Dosing by Pharmacy- FOLLOW UP    Dustin Ace is a 74 y.o. year old male who Pharmacy has been consulted for vancomycin dosing for osteomyelitis/septic arthritis. Based on the patient's indication and renal status this patient is being dosed based on a goal AUC of 400-600.     Renal function is currently stable.    Current vancomycin dose: 1250 mg given every 12 hours    Estimated vancomycin AUC on current dose: 550 mg/L.hr     Visit Vitals  /62   Pulse 69   Temp 36.6 °C (97.9 °F)   Resp 16        Lab Results   Component Value Date    CREATININE 0.76 2024    CREATININE 0.79 2024    CREATININE 0.90 2024    CREATININE 0.67 2024        Patient weight is as follows:   Vitals:    24 0852   Weight: 90.7 kg (199 lb 15.3 oz)       Cultures:  Susceptibility data for the encounter in last 14 days.  Collected Specimen Info Organism Aztreonam Cefepime Ceftazidime Ceftriaxone Ciprofloxacin Levofloxacin Penicillin Piperacillin/Tazobactam Tetracycline Tobramycin Vancomycin   24 Tissue/Biopsy from Wound/Tissue Pseudomonas aeruginosa  S  S  S   S  S   S   S      Streptococcus gordonii     S    S   R   S        I/O last 3 completed shifts:  In: 1020 (11.2 mL/kg) [P.O.:1020]  Out: 1270 (14 mL/kg) [Urine:1270 (0.4 mL/kg/hr)]  Weight: 90.7 kg   I/O during current shift:  No intake/output data recorded.    Temp (24hrs), Av.4 °C (97.5 °F), Min:36.2 °C (97.2 °F), Max:36.6 °C (97.9 °F)      Assessment/Plan    Within goal AUC range. Continue current vancomycin regimen.    This dosing regimen is predicted by [employer] to result in the following pharmacokinetic parameters:  Loading dose: N/A  Regimen: 1250 mg IV every 12 hours.  Start time: 21:44 on 2024  Exposure target: AUC24 (range)400-600 mg/L.hr   OTG26-93: 468 mg/L.hr  AUC24,ss: 534 mg/L.hr  Probability of AUC24 > 400: 100 %  Ctrough,ss: 17.7 mg/L  Probability of Ctrough,ss > 20: 11 %      The next level will be obtained on  12/1 at Am labs. May be obtained sooner if clinically indicated.   Will continue to monitor renal function daily while on vancomycin and order serum creatinine at least every 48 hours if not already ordered.  Follow for continued vancomycin needs, clinical response, and signs/symptoms of toxicity.       Bindu Burroughs, [facility]

## 2024-11-30 NOTE — PROGRESS NOTES
Vancomycin Dosing by Pharmacy- Cessation of Therapy    Consult to pharmacy for vancomycin dosing has been discontinued by the prescriber, pharmacy will sign off at this time.    Please call pharmacy if there are further questions or re-enter a consult if vancomycin is resumed.     Bindu Burroughs, [facility]

## 2024-11-30 NOTE — PROGRESS NOTES
VASCULAR SURGERY PROGRESS NOTE  Assessment/Plan   Dustin Ace is 74 y.o. male with history of emphysema, BPH, CVA hx, PAD, Afib (on Eliquis), HTN, DM who presented to the ED 11/25 with nonhealing wound of his right foot, fevers, chronic osteomyelitis. Right foot has been deemed unsalvageable. He is amenable to a R BKA.   Although febrile on arrival, vitals have stabilized, WBC count normalized on vanc/zosyn.     CTA coronaries showing multivessel disease -- per conversation with cardiology this morning, heart cath will not be needed. Will proceed with plans for amputation tomorrow in OR.    11/30: POD s/p R ankle disarticulation. Pain tolerable. Dressing changes twice daily.    Plan:  Continue antibiotics  Twice daily dressing changes  Please make NPO at midnight the night before surgery  Okay to bridge Eliquis with heparin gtt  Formalization plans TBD>    D/w attending, Dr. Evie Phoenix MD  Vascular Surgery, PGY4  Team Pager: [phone]    Subjective   No overnight issues.  Pain tolerable.    Objective   Vitals:  Heart Rate:  [62-86]   Temp:  [36.2 °C (97.2 °F)-36.6 °C (97.9 °F)]   Resp:  [10-16]   BP: (118-129)/(62-91)   SpO2:  [97 %-100 %]     Exam:  Constitutional: No acute distress, resting comfortably  Neuro:  AOx3, grossly intact, some left eye ptosis at baseline  ENMT: moist mucous membranes  Head/neck: atraumatic  CV: no tachycardia  Pulm: non-labored on room air  GI: soft, non-tender, non-distended  Skin: warm and dry  Musculoskeletal: moving all extremities  Extremities: RLE s/p guillotine BKA. Palpable R femoral and popliteal.    Labs:  Results from last 7 days   Lab Units 11/30/24  0839 11/29/24  1625 11/28/24  1616   WBC AUTO x10*3/uL 7.4 6.7 9.9   HEMOGLOBIN g/dL 9.4* 8.7* 9.6*   PLATELETS AUTO x10*3/uL 398 380 419      Results from last 7 days   Lab Units 11/30/24  0839 11/29/24  1625 11/28/24  1616   SODIUM mmol/L 139 136 136   POTASSIUM mmol/L 3.9 4.2 4.3   CHLORIDE mmol/L 106 104  103   CO2 mmol/L 24 24 24   BUN mg/dL 9 7 9   CREATININE mg/dL 0.73 0.76 0.79   GLUCOSE mg/dL 120* 88 117*   MAGNESIUM mg/dL 1.97 1.98 2.22   PHOSPHORUS mg/dL 2.9 3.5 3.0      Results from last 7 days   Lab Units 11/29/24  1625 11/28/24  1616 11/28/24  0921   INR  1.3* 1.1 3.7*   PROTIME seconds 14.1* 12.9* 41.9*   APTT seconds 31  --   --       Results from last 7 days   Lab Units 11/30/24  1003 11/30/24  0311 11/29/24  2237   ANTI XA UNFRACTIONATED IU/mL 0.1 <0.1 <0.1

## 2024-11-30 NOTE — PROGRESS NOTES
Physical Therapy                 Therapy Communication Note    Patient Name: Dustin Ace  MRN: [id number]  Department: [place] [id number]  Room: [facility]  Today's Date: 11/30/2024     Discipline: Physical Therapy    Missed Visit Reason: Missed Visit Reason:  (@1050 pt refused to participate at this time, reports just received pain meds and is feeling sleepy.  Reports he understands the importance of PT but continues to decline to participate at this time.)    Missed Time: Attempt      11/30/24 at 12:52 PM - Xochilt Robb, PT

## 2024-11-30 NOTE — PROGRESS NOTES
24-hour updates:  -s/p R BKA yesterday (vasc surg)  -meeting s/p/o milestones today, Hb stable  -vasc surg following for wound care, appreciate recs and input on barriers to DC  -PT eval for dispo recs  -if vasc surg thinks achieved source control then will likely stop antibiotic tomorrow      Subjective   Pain tolerable today. BM last night. Eating well. Updated on plan.       Objective     Last Recorded Vitals  /62   Pulse 69   Temp 36.6 °C (97.9 °F)   Resp 16   Wt 90.7 kg (199 lb 15.3 oz)   SpO2 99%   Intake/Output last 3 Shifts:    Intake/Output Summary (Last 24 hours) at 11/30/2024 1049  Last data filed at 11/30/2024 0300  Gross per 24 hour   Intake 500 ml   Output 570 ml   Net -70 ml       Admission Weight  Weight: 90.7 kg (200 lb) (11/25/24 1123)    Daily Weight  11/29/24 : 90.7 kg (199 lb 15.3 oz)    Image Results  CT angio coronary art with heartflow if score >30%  Narrative: Interpreted By:  Hetal Jorgensen,   STUDY:  CT ANGIO CORONARY ART WITH HEARTFLOW IF SCORE >30%;  11/27/2024 2:57  pm      INDICATION:  Signs/Symptoms:surgical risk stratification.  .  There is need to  define coronary anatomy.          COMPARISON:  None.      ACCESSION NUMBER(S):  [id number]      ORDERING CLINICIAN:  ELIZABETH RODRIGUES      TECHNIQUE:  Using multi-detector CT technology,  axial, sequential imaging with  prospective gating was performed of the chest following the  intravenous administration of 90 ML Omnipaque 350.      The patient was premedicated with 0.8 mg sublingual nitroglycerin for  coronary dilation, respectively.      For optimization of anatomic evaluation, multiplanar reconstruction,  maximum intensity projections, and advanced 3-D off-line  postprocessing were performed on a dedicated stand-alone workstation  under the direct supervision of the interpreting physician.      FINDINGS:  POTENTIAL STUDY LIMITATIONS:  Study is significantly limited due to  motion artifact..      CORONARY  ARTERIES:      Coronary artery calcium score:  LM: 0.2  LAD: 926.7  CX: 748.8  RCA: 505.8      Total: 2181.5      CORONARY ANATOMY:  There is normal origin of the coronary arteries.      LEFT MAIN CORONARY ARTERY:  The left main is normal sized vessel that  bifurcates into the LAD  and circumflex. There is no significant atherosclerotic change or  stenotic disease.      LEFT ANTERIOR DESCENDING ARTERY:  The LAD is a normal size vessel that  wraps around the apex.  It gives rise to  4 acute diagonal branches.  Multiple calcified plaques are identified in the proximal and mid LAD  Dominant area of stenosis is approximately 2.3 cm from the origin of  LAD and causing approximately 70% stenosis.      LEFT CIRCUMFLEX ARTERY:  The LCX is a normal size vessel, which is  non-dominant.  It gives rise to  3 obtuse marginal branches.  Multiple calcified plaques are identified in the proximal and mid LAD  causing 50-70% stenosis.      RAMUS INTERMEDIUS:  Not present      RIGHT CORONARY ARTERY:  The RCA is a normal size vessel, which is  dominant .  It gives rise to a  conus branch,  cameron branch, and  2 acute  marginal branches.  In its distal segment it bifurcates into the PDA  and PV branch. Multiple calcified plaques are identified in the  proximal, mid and distal RCA. Unable to assess the degree of stenosis  due to significant motion.      CARDIAC CHAMBERS:  The cardiac chambers demonstrate normal atrioventricular and  ventriculoarterial concordance, and systemic and pulmonary venous  return.      LEFT ATRIUM:  Dilated (31.3-cm2)      RIGHT ATRIUM:  Normal size (20.3-cm2)      INTERATRIAL SEPTUM:  Intact.          LEFT VENTRICLE:  Normal size (4.3-cm)      RIGHT VENTRICLE:  Normal size (3.8-cm)      AORTIC VALVE:  The aortic valve is  trileaflet in morphology.  There is  calcification the aortic valve.      MITRAL VALVE:  No thickening/calcification.      THORACIC AORTA:  The visualized thoracic aorta is normal in course,  caliber, and  contour. There is no acute aortic pathology, such as dissection,  intramural hematoma, or contained rupture. The aortic arch is not  included on this examination.      PERICARDIUM:  There is no pericardial effusion of thickening.      CHEST:  The chest wall is normal.  No significant lymphadenopathy or mass is seen in limited images of  the mediastinum. Limited imaging through the lungs reveals no gross  abnormalities. No pleural effusion or pneumothorax.  Partially imaged presumed lipoma along the left lateral chest  wall/pleural lining was also seen 10/25/2021. Assessment for  stability in size is not possible as it was partially imaged on the  prior study and in the current study.      UPPER ABDOMEN:  Limited imaging through the upper abdomen reveals no abnormalities of  the visualized organs.      Impression: 1. Study is significantly limited due to motion artifact.  2. Multiple calcified plaques are identified in the proximal and mid  LAD. Dominant focus of stenosis located approximately 2.3 cm from the  origin of LAD and causing approximately 70% stenosis.  3. Multiple calcified plaques are identified in the proximal, mid and  distal RCA. Unable to assess the degree of stenosis due to  significant motion.  4. Multiple calcified plaques are identified in the proximal and mid  LAD causing 50-70% stenosis.  5. Normal coronary artery anatomy with right dominant system.  6. Total coronary artery calcium score 2181.5  7. Dilated left atrium.  8. Partially imaged presumed lipoma along the left lateral chest  wall/pleural lining was also seen 10/25/2021. Assessment for  stability in size is not possible as it was partially imaged on the  prior study and in the current study.          MACRO:  None      Signed by: Hetal Yap 11/27/2024 4:13 PM  Dictation workstation:   [id number]  Transthoracic Echo (TTE) [facility], [address] [place] [address]                  Tel [phone] and Fax [phone]    TRANSTHORACIC ECHOCARDIOGRAM REPORT       Patient Name:       KRUPA JOHNSON        Reading Physician:    [id number] Amadou Perez MD  Study Date:         11/27/2024          Ordering Provider:    [id number] ELIZABETH RODRIGUES  MRN/PID:            [id number]            Fellow:  Accession#:         [id number]        Nurse:                Savanah Berrios RN  Date of Birth/Age:  1950 / 74      Sonographer:          Carley Martinez RCS                      years  Gender assigned at  M                   Additional Staff:  Birth:  Height:             190.00 cm           Admit Date:  Weight:             90.72 kg            Admission Status:     Inpatient - STAT  BSA / BMI:          2.19 m2 / 25.13     Encounter#:           [id number]                      kg/m2  Blood Pressure:     116/71 mmHg         Department Location:  [facility]                                                                Non Invasive    Study Type:    TRANSTHORACIC ECHO (TTE) COMPLETE  Diagnosis/ICD: Encounter for other preprocedural examination-Z01.818  Indication:    Pre-procedural  CPT Code:      Echo Complete w Full Doppler-63701    Patient History:  Pertinent History: CVA and PVD.    Study Detail: The following Echo studies were performed: 2D, M-Mode, Doppler and                color flow. Technically challenging study due to prominent lung                artifact. Agitated saline used as a contrast agent for intraseptal                flow evaluation.       PHYSICIAN INTERPRETATION:  Left Ventricle: Left ventricular ejection fraction is normal, calculated by Appiah's biplane at 57%. The patient is in atrial flutter which may influence the estimate of left ventricular function and transvalvular flows. There is mild concentric left ventricular hypertrophy. There are no regional left  ventricular wall motion abnormalities. The left ventricular cavity size is normal. Left ventricular diastolic filling cannot be determined, due to atrial fibrillation/flutter.  Left Atrium: The left atrium is mildly dilated. There is no evidence of a patent foramen ovale. A bubble study using agitated saline was performed. Bubble study is negative.  Right Ventricle: The right ventricle is normal in size. There is normal right ventricular global systolic function.  Right Atrium: The right atrium is upper limits of normal in size.  Aortic Valve: The aortic valve is probably trileaflet. There is moderate aortic valve cusp calcification. There is reduced aortic valve non coronary cusp excursion. There is trace aortic valve regurgitation. The peak instantaneous gradient of the aortic valve is 5 mmHg.  Mitral Valve: The mitral valve is normal in structure. There is mild mitral valve regurgitation.  Tricuspid Valve: The tricuspid valve is structurally normal. There is trace to mild tricuspid regurgitation.  Pulmonic Valve: The pulmonic valve is structurally normal. There is trace pulmonic valve regurgitation.  Pericardium: There is no pericardial effusion noted. There is an anterior clear space.  Aorta: The aortic root is normal. The Ao Sinus is 3.50 cm. The Asc Ao is 3.40 cm. There is upper limits of normal dilatation of the ascending aorta. There is no dilatation of the aortic root.  Pulmonary Artery: The tricuspid regurgitant velocity is 2.51 m/s, and with an estimated right atrial pressure of 3 mmHg, the estimated pulmonary artery pressure is normal with the RVSP at 28.2 mmHg.  Systemic Veins: The inferior vena cava appears normal in size.       CONCLUSIONS:   1. The patient is in atrial flutter which may influence the estimate of left ventricular function and transvalvular flows.   2. Left ventricular ejection fraction is normal, calculated by Appiah's biplane at 57%.   3. Left ventricular diastolic filling cannot be  determined, due to atrial fibrillation/flutter.   4. There is normal right ventricular global systolic function.   5. The left atrium is mildly dilated.   6. There is moderate aortic valve cusp calcification with reduced excursion (non-coronary cusp).   7. There is no evidence of a patent foramen ovale.    RECOMMENDATIONS:  Utilizing an FDA cleared automated machine learning algorithm (EchoGo Heart Failure by [employer]), the analysis of the apical 4-chamber echocardiogram suggests the presence of heart failure with preserved ejection fraction (HFpEF)*. Clinical correlation looking for additional heart failure signs and symptoms is recommended, as a definite diagnosis of heart failure cannot be made by imaging alone.  *Per ACC/AHA/HFSA universal diagnosis of heart failure, HFpEF is defined as 1) signs and symptoms leading to clinical diagnosis of heart failure, 2) an ejection fraction of at least 50%, and 3) evidence of elevated intra-cardiac filling pressures by echocardiography, BNP elevation, or catheterization.     QUANTITATIVE DATA SUMMARY:     2D MEASUREMENTS:          Normal Ranges:  Ao Root d:       3.50 cm  (2.0-3.7cm)  LAs:             4.10 cm  (2.7-4.0cm)  IVSd:            1.50 cm  (0.6-1.1cm)  LVPWd:           1.20 cm  (0.6-1.1cm)  LVIDd:           4.20 cm  (3.9-5.9cm)  LVIDs:           2.90 cm  LV Mass Index:   97 g/m2  LVEDV Index:     37 ml/m2  LV % FS          31.0 %       LA VOLUME:                    Normal Ranges:  LA Vol A4C:        91.6 ml    (22+/-6mL/m2)  LA Vol A2C:        90.1 ml  LA Vol BP:         90.9 ml  LA Vol Index A4C:  41.8ml/m2  LA Vol Index A2C:  41.2 ml/m2  LA Vol Index BP:   41.5 ml/m2  LA Area A4C:       25.0 cm2  LA Area A2C:       24.8 cm2  LA Major Axis A4C: 5.8 cm  LA Major Axis A2C: 5.8 cm  LA Volume Index:   41.5 ml/m2       RA VOLUME BY A/L METHOD:            Normal Ranges:  RA Vol A4C:              47.0 ml    (8.3-19.5ml)  RA Vol Index A4C:        21.5 ml/m2  RA Area A4C:              17.6 cm2  RA Major Axis A4C:       5.6 cm       AORTA MEASUREMENTS:         Normal Ranges:  Ao Sinus, d:        3.50 cm (2.1-3.5cm)  Asc Ao, d:          3.40 cm (2.1-3.4cm)       LV SYSTOLIC FUNCTION BY 2D PLANIMETRY (MOD):                       Normal Ranges:  EF-A4C View:    59 % (>=55%)  EF-A2C View:    56 %  EF-Biplane:     57 %  LV EF Reported: 57 %       LV DIASTOLIC FUNCTION:            Normal Ranges:  MV Peak E:             1.02 m/s   (0.7-1.2 m/s)  MV Peak A:             0.24 m/s   (0.42-0.7 m/s)  E/A Ratio:             4.25       (1.0-2.2)  MV e'                  0.100 m/s  (>8.0)  MV lateral e'          0.12 m/s  MV medial e'           0.07 m/s  E/e' Ratio:            10.23      (<8.0)  PulmV Sys Boyd:         64.70 cm/s  PulmV Bauer Boyd:        22.50 cm/s  PulmV S/D Boyd:         2.90       MITRAL VALVE:          Normal Ranges:  MV DT:        176 msec (150-240msec)       AORTIC VALVE:           Normal Ranges:  AoV Vmax:      1.13 m/s (<=1.7m/s)  AoV Peak P.1 mmHg (<20mmHg)  LVOT Max Boyd:  0.92 m/s (<=1.1m/s)  LVOT VTI:      18.60 cm  LVOT Diameter: 2.00 cm  (1.8-2.4cm)  AoV Area,Vmax: 2.56 cm2 (2.5-4.5cm2)       RIGHT VENTRICLE:  RV Basal 3.70 cm  RV Mid   2.70 cm  RV Major 7.3 cm  TAPSE:   20.3 mm  RV s'    0.13 m/s       TRICUSPID VALVE/RVSP:          Normal Ranges:  Peak TR Velocity:     2.51 m/s  Est. RA Pressure:     3 mmHg  RV Syst Pressure:     28 mmHg  (< 30mmHg)  IVC Diam:             1.70 cm       PULMONIC VALVE:          Normal Ranges:  PV Accel Time:  113 msec (>120ms)  PV Max Boyd:     0.8 m/s  (0.6-0.9m/s)  PV Max P.6 mmHg       Pulmonary Veins:  PulmV Bauer Boyd: 22.50 cm/s  PulmV S/D Boyd:  2.90  PulmV Sys Boyd:  64.70 cm/s       AORTA:  Asc Ao Diam 3.47 cm       [id number] Amadou Perez MD  Electronically signed on 2024 at 1:10:26 PM       ** Final **      Physical Exam  Constitutional:       General: He is not in acute distress.     Appearance: He is not  ill-appearing.   HENT:      Head: Normocephalic and atraumatic.      Mouth/Throat:      Mouth: Mucous membranes are moist.   Eyes:      Extraocular Movements: Extraocular movements intact.      Pupils: Pupils are equal, round, and reactive to light.      Comments: Prosthetic left eye   Cardiovascular:      Rate and Rhythm: Normal rate and regular rhythm.      Heart sounds: Normal heart sounds.   Pulmonary:      Effort: Pulmonary effort is normal.      Breath sounds: Normal breath sounds.   Abdominal:      General: Abdomen is flat.      Palpations: Abdomen is soft.   Musculoskeletal:      Left lower leg: No edema.   Feet:      Comments: Left hallux amputation with dry skin. Appears intact. Noted callus under the first MTP. Right side: lateral shin with a 3 cm wound with a pink dermis without purulence. Soft tissue defect in the middle foot. Appears to have depth of approximately 1 cm. Yellow pus coming out. Edema in the R lower extremity greater than the L.     See media tab.  Neurological:      Mental Status: He is alert and oriented to person, place, and time.         Relevant Results               Assessment/Plan      Assessment & Plan  Osteomyelitis of right foot, unspecified type (Multi)    HTN (hypertension)    Paroxysmal atrial fibrillation (Multi)    JOSE (obstructive sleep apnea)    CVA (cerebral vascular accident) (Multi)    Pulmonary emphysema (Multi)    Dustin Ace is a 74 y.o. male with a history of BPH, centrilobular emphysema, chronic hepatitis C (treated), CVA, diabetes mellitus, hypertension, PAD, history of Charcot deformity, chronic right foot osteomyelitis, paroxysmal atrial fibrillation presenting for a chronic R foot wound. Patient met SIRS criteria on admission. Started on vanc and zosyn and got 500mL bolus, vitals now improved. MRI confirmed R foot OM.     Pre-Op Risk Assessment:  Labs :   - CBC, RFP, EKG, CXR, Coags, T&S  - Mild leukocytosis upon presentation, now resolved. Anemia of chronic  disease with hgb stable between 8 to 10. ALT mildly elevated at 59 and t.bili 1.3 on 11/25. No renal dysfunction. On apixaban at home with coags on 11/25 showing INR 2.1 and PT 24.2. Most recent INR 1.1 on 11/28.  - Met SIRS criteria upon admission but started on broad spectrum antibiotics and he quickly improved. Vital signs stable while inpatient. On amlodipine at home, currently held given he is normotensive.  General Risk Assessment:   - History of stroke, PAD, paroxysmal afib, centrilobular emphysema, JOSE  - No history of CAD, CHF  - Patient has lifestyle controlled T2DM, not on Insulin at home, currently not requiring any while inpatient  - Apixaban held with low intensity heparin drip pending surgery, will stop morning of surgery  Cardiac Risk Assessment:   -Cardiology consulted for risk stratification per vascular surgery request: TTE showed normal EF. CTA Coronary showed 50-70% in multiple coronary arteries. Moderate risk for surgery. No plan for LHC pre-operatively.    #Chronic infected right leg wound s/p multiple I&D's  #SIRS secondary to infected wound (resolved)  #Osteomyelitis  #Peripheral vascular disease  #Charcot deformity and neuropathy  - CRP: 16.25, ESR: 96  - SIRS criteria with source of infection, though no signs of end-organ damage currently, vitals improved with vanc, zosyn, and IVF bolus  - MRI showed OM in R foot  - Patient seen by podiatry, deemed R foot unsalvageable and recommended BKA  - ARMANDO showed mild disease in RLE, none in LLE    Cultures:  BCx (11/25): NGTD  Wound cultures (11/25): Pseudomonas and Strep gordonii     Antibiotics:  Vancomycin (11/25 - present)  Zosyn (11/25 - present)     Plan:  - Continue with vancomycin and Zosyn   - Continue with gabapentin  - PT/OT  - Tylenol 975 mg q8h PRN.   - Patient started on heparin drip to bridge to surgery while his home eliquis is being held  - Cardiology consulted for risk stratification per vascular surgery request  -To OR for R BKA  with vascular surgery 11/29     #Hyperbilirubinemia  #Elevated ALT  - Noted to have a total bilirubin of 1.3. Direct bilirubin of 0.5.   - ALT elevated to 59  - Unclear etiology. Previously had a history of chronic hepatitis C that was treated.   - Elevated PT/INR, normal aPTT. On apixaban at home  Plan:  - Continue to monitor     #Diabetes mellitus  - Blood sugar in the 200s on BMP, may be secondary to stress hyperglycemia  Plan:  - POC glucose checks, will add sliding scale insulin if needed     #HTN  - Hold amlodipine in the setting of possible sepsis. Reintroduce as appropriate     #Paroxysmal Afib  - Hold apixaban for possible procedure/surgical intervention.   -Low intensity therapeutic heparin drip    #HLD  -continue home atorvastatin     #Anemia of chronic disease  - Baseline of approximately 12-13  - Presented at 9.0. Normocytic with elevated RDW  - Iron 25 (low), TIBC (low), UIBC 165, 13% sat, ferritin of 1,087, suggestive of anemia of chronic disease  - Retic absolute: 0.086, retic %: 2.7%. RPI <2, indicates inadequate response  Plan:   - Treat underlying infection     #Insomnia  #Anxiety  - Patient reports taking it approximately once per month  - Hold diazepam. Will give anxiolytic if needed     #BPH  - Continue tamsulosin     #MISC  - Continue B-complex with vitamin C  - Continue vitamin D  - Continue Maalox     F: PRN  E: PRN  N: regular diet  A: PIV  Bowel regimen: Miralax     DVT ppx: Holding home apixaban, heparin drip stopped for surgery  GI ppx: None    Code Status: Full code (confirmed on admission)  Surrogate Medical Decision-maker: Coco Finley (daughter): [phone]; Yanet Spencerlas (son): [phone]            Chin Daniel MD  PGY-1

## 2024-11-30 NOTE — PROGRESS NOTES
Vancomycin Dosing by Pharmacy- FOLLOW UP    Dustin Ace is a 74 y.o. year old male who Pharmacy has been consulted for vancomycin dosing for osteomyelitis/septic arthritis. Based on the patient's indication and renal status this patient is being dosed based on a goal AUC of 400-600.     Renal function is currently stable.    Current vancomycin dose: 1000 mg given every 12 hours    Estimated vancomycin AUC on current dose: 405 mg/L.hr     Visit Vitals  /71   Pulse 64   Temp 36.2 °C (97.2 °F)   Resp 12        Lab Results   Component Value Date    CREATININE 0.76 2024    CREATININE 0.79 2024    CREATININE 0.90 2024    CREATININE 0.67 2024        Patient weight is as follows:   Vitals:    24 0852   Weight: 90.7 kg (199 lb 15.3 oz)       Cultures:  Susceptibility data for the encounter in last 14 days.  Collected Specimen Info Organism Aztreonam Cefepime Ceftazidime Ceftriaxone Ciprofloxacin Levofloxacin Penicillin Piperacillin/Tazobactam Tetracycline Tobramycin Vancomycin   24 Tissue/Biopsy from Wound/Tissue Pseudomonas aeruginosa  S  S  S   S  S   S   S      Streptococcus gordonii     S    S   R   S        I/O last 3 completed shifts:  In: 1000 (11 mL/kg) [P.O.:1000]  Out: 960 (10.6 mL/kg) [Urine:960 (0.3 mL/kg/hr)]  Weight: 90.7 kg   I/O during current shift:  No intake/output data recorded.    Temp (24hrs), Av.3 °C (97.4 °F), Min:36.2 °C (97.2 °F), Max:36.5 °C (97.7 °F)      Assessment/Plan    Increasing the dose to 1250mg Q12H    This dosing regimen is predicted by [employer] to result in the following pharmacokinetic parameters:  Loading dose: N/A  Regimen: 1250 mg IV every 12 hours.  Start time: 19:46 on 2024  Exposure target: AUC24 (range)400-600 mg/L.hr   [id number]: 486 mg/L.hr  AUC24,ss: 551 mg/L.hr  Probability of AUC24 > 400: 100 %  Ctrough,ss: 18.4 mg/L  Probability of Ctrough,ss > 20: 20 %      The next level will be obtained on  at Mid AM Labs. May be  obtained sooner if clinically indicated.   Will continue to monitor renal function daily while on vancomycin and order serum creatinine at least every 48 hours if not already ordered.  Follow for continued vancomycin needs, clinical response, and signs/symptoms of toxicity.       Kesha Abarca, PharmD

## 2024-12-01 VITALS
HEIGHT: 75 IN | TEMPERATURE: 97.7 F | RESPIRATION RATE: 18 BRPM | WEIGHT: 199.96 LBS | DIASTOLIC BLOOD PRESSURE: 72 MMHG | BODY MASS INDEX: 24.86 KG/M2 | HEART RATE: 69 BPM | OXYGEN SATURATION: 100 % | SYSTOLIC BLOOD PRESSURE: 138 MMHG

## 2024-12-01 LAB
ALBUMIN SERPL BCP-MCNC: 3 G/DL (ref 3.4–5)
ANION GAP SERPL CALC-SCNC: 12 MMOL/L (ref 10–20)
BASOPHILS # BLD AUTO: 0.03 X10*3/UL (ref 0–0.1)
BASOPHILS NFR BLD AUTO: 0.3 %
BUN SERPL-MCNC: 10 MG/DL (ref 6–23)
CALCIUM SERPL-MCNC: 8.5 MG/DL (ref 8.6–10.6)
CHLORIDE SERPL-SCNC: 104 MMOL/L (ref 98–107)
CO2 SERPL-SCNC: 24 MMOL/L (ref 21–32)
CREAT SERPL-MCNC: 0.8 MG/DL (ref 0.5–1.3)
EGFRCR SERPLBLD CKD-EPI 2021: >90 ML/MIN/1.73M*2
EOSINOPHIL # BLD AUTO: 0.13 X10*3/UL (ref 0–0.4)
EOSINOPHIL NFR BLD AUTO: 1.4 %
ERYTHROCYTE [DISTWIDTH] IN BLOOD BY AUTOMATED COUNT: 17.3 % (ref 11.5–14.5)
GLUCOSE BLD MANUAL STRIP-MCNC: 120 MG/DL (ref 74–99)
GLUCOSE BLD MANUAL STRIP-MCNC: 125 MG/DL (ref 74–99)
GLUCOSE BLD MANUAL STRIP-MCNC: 138 MG/DL (ref 74–99)
GLUCOSE BLD MANUAL STRIP-MCNC: 159 MG/DL (ref 74–99)
GLUCOSE BLD MANUAL STRIP-MCNC: 180 MG/DL (ref 74–99)
GLUCOSE SERPL-MCNC: 122 MG/DL (ref 74–99)
HCT VFR BLD AUTO: 26.8 % (ref 41–52)
HGB BLD-MCNC: 8.2 G/DL (ref 13.5–17.5)
IMM GRANULOCYTES # BLD AUTO: 0.05 X10*3/UL (ref 0–0.5)
IMM GRANULOCYTES NFR BLD AUTO: 0.6 % (ref 0–0.9)
LYMPHOCYTES # BLD AUTO: 2.86 X10*3/UL (ref 0.8–3)
LYMPHOCYTES NFR BLD AUTO: 31.8 %
MAGNESIUM SERPL-MCNC: 1.79 MG/DL (ref 1.6–2.4)
MCH RBC QN AUTO: 27.2 PG (ref 26–34)
MCHC RBC AUTO-ENTMCNC: 30.6 G/DL (ref 32–36)
MCV RBC AUTO: 89 FL (ref 80–100)
MONOCYTES # BLD AUTO: 0.79 X10*3/UL (ref 0.05–0.8)
MONOCYTES NFR BLD AUTO: 8.8 %
NEUTROPHILS # BLD AUTO: 5.12 X10*3/UL (ref 1.6–5.5)
NEUTROPHILS NFR BLD AUTO: 57.1 %
NRBC BLD-RTO: 0 /100 WBCS (ref 0–0)
PHOSPHATE SERPL-MCNC: 2.9 MG/DL (ref 2.5–4.9)
PLATELET # BLD AUTO: 336 X10*3/UL (ref 150–450)
POTASSIUM SERPL-SCNC: 3.6 MMOL/L (ref 3.5–5.3)
RBC # BLD AUTO: 3.01 X10*6/UL (ref 4.5–5.9)
SODIUM SERPL-SCNC: 136 MMOL/L (ref 136–145)
UFH PPP CHRO-ACNC: 0.1 IU/ML
UFH PPP CHRO-ACNC: 1.4 IU/ML
UFH PPP CHRO-ACNC: <0.1 IU/ML
VANCOMYCIN SERPL-MCNC: 8.7 UG/ML (ref 5–20)
WBC # BLD AUTO: 9 X10*3/UL (ref 4.4–11.3)

## 2024-12-01 PROCEDURE — 80069 RENAL FUNCTION PANEL: CPT

## 2024-12-01 PROCEDURE — 97161 PT EVAL LOW COMPLEX 20 MIN: CPT | Mod: GP

## 2024-12-01 PROCEDURE — 99233 SBSQ HOSP IP/OBS HIGH 50: CPT

## 2024-12-01 PROCEDURE — 2500000001 HC RX 250 WO HCPCS SELF ADMINISTERED DRUGS (ALT 637 FOR MEDICARE OP)

## 2024-12-01 PROCEDURE — 80202 ASSAY OF VANCOMYCIN: CPT

## 2024-12-01 PROCEDURE — 82947 ASSAY GLUCOSE BLOOD QUANT: CPT

## 2024-12-01 PROCEDURE — 2500000002 HC RX 250 W HCPCS SELF ADMINISTERED DRUGS (ALT 637 FOR MEDICARE OP, ALT 636 FOR OP/ED)

## 2024-12-01 PROCEDURE — 83735 ASSAY OF MAGNESIUM: CPT

## 2024-12-01 PROCEDURE — 1100000001 HC PRIVATE ROOM DAILY

## 2024-12-01 PROCEDURE — 85520 HEPARIN ASSAY: CPT

## 2024-12-01 PROCEDURE — 97116 GAIT TRAINING THERAPY: CPT | Mod: GP

## 2024-12-01 PROCEDURE — 85025 COMPLETE CBC W/AUTO DIFF WBC: CPT

## 2024-12-01 PROCEDURE — 2500000004 HC RX 250 GENERAL PHARMACY W/ HCPCS (ALT 636 FOR OP/ED)

## 2024-12-01 PROCEDURE — 36415 COLL VENOUS BLD VENIPUNCTURE: CPT

## 2024-12-01 PROCEDURE — 36591 DRAW BLOOD OFF VENOUS DEVICE: CPT

## 2024-12-01 ASSESSMENT — COGNITIVE AND FUNCTIONAL STATUS - GENERAL
STANDING UP FROM CHAIR USING ARMS: A LITTLE
WALKING IN HOSPITAL ROOM: A LITTLE
MOVING TO AND FROM BED TO CHAIR: A LITTLE
CLIMB 3 TO 5 STEPS WITH RAILING: A LOT
STANDING UP FROM CHAIR USING ARMS: A LITTLE
WALKING IN HOSPITAL ROOM: A LITTLE
MOBILITY SCORE: 19
TURNING FROM BACK TO SIDE WHILE IN FLAT BAD: A LITTLE
DRESSING REGULAR LOWER BODY CLOTHING: A LITTLE
CLIMB 3 TO 5 STEPS WITH RAILING: A LITTLE
MOVING TO AND FROM BED TO CHAIR: A LITTLE
HELP NEEDED FOR BATHING: A LITTLE
CLIMB 3 TO 5 STEPS WITH RAILING: TOTAL
TURNING FROM BACK TO SIDE WHILE IN FLAT BAD: A LITTLE
DAILY ACTIVITIY SCORE: 23
EATING MEALS: A LITTLE
WALKING IN HOSPITAL ROOM: A LITTLE
MOVING TO AND FROM BED TO CHAIR: A LITTLE
MOBILITY SCORE: 16
DAILY ACTIVITIY SCORE: 20
MOVING FROM LYING ON BACK TO SITTING ON SIDE OF FLAT BED WITH BEDRAILS: A LITTLE
DRESSING REGULAR UPPER BODY CLOTHING: A LITTLE
STANDING UP FROM CHAIR USING ARMS: A LITTLE
TOILETING: A LITTLE
MOBILITY SCORE: 18
MOVING FROM LYING ON BACK TO SITTING ON SIDE OF FLAT BED WITH BEDRAILS: A LITTLE

## 2024-12-01 ASSESSMENT — PAIN DESCRIPTION - ORIENTATION: ORIENTATION: RIGHT

## 2024-12-01 ASSESSMENT — PAIN SCALES - GENERAL
PAINLEVEL_OUTOF10: 7
PAINLEVEL_OUTOF10: 5 - MODERATE PAIN
PAINLEVEL_OUTOF10: 6
PAINLEVEL_OUTOF10: 4
PAINLEVEL_OUTOF10: 7
PAINLEVEL_OUTOF10: 5 - MODERATE PAIN

## 2024-12-01 ASSESSMENT — PAIN DESCRIPTION - LOCATION: LOCATION: FOOT

## 2024-12-01 ASSESSMENT — PAIN - FUNCTIONAL ASSESSMENT
PAIN_FUNCTIONAL_ASSESSMENT: 0-10

## 2024-12-01 NOTE — PROGRESS NOTES
VASCULAR SURGERY PROGRESS NOTE  Assessment/Plan   Dustin Ace is 74 y.o. male with history of emphysema, BPH, CVA hx, PAD, Afib (on Eliquis), HTN, DM who presented to the ED 11/25 with nonhealing wound of his right foot, fevers, chronic osteomyelitis. Right foot has been deemed unsalvageable. He is amenable to a R BKA.   Although febrile on arrival, vitals have stabilized, WBC count normalized on vanc/zosyn.     CTA coronaries showing multivessel disease -- per conversation with cardiology this morning, heart cath will not be needed. Will proceed with plans for amputation tomorrow in OR.    11/30: POD s/p R ankle disarticulation. Pain tolerable. Dressing changes twice daily.    Plan:  Formalization planned tomorrow  Continue antibiotics  Twice daily dressing changes: wet to dry kerlix with abdominal pad in between wrapping. Cover with Ace bandage  Please make NPO at midnight the night before surgery  Please hold heparin 6 hours before surgery    D/w attending, Dr. Evie Diaz MD  General Surgery, PGY1  Team Pager: [phone]    Subjective   No overnight issues.  Pain tolerable. No fevers/chills    Objective   Vitals:  Heart Rate:  [66-84]   Temp:  [36 °C (96.8 °F)-36.9 °C (98.4 °F)]   Resp:  [15-20]   BP: (117-143)/(64-75)   SpO2:  [94 %-100 %]     Exam:  Constitutional: No acute distress, resting comfortably  Neuro:  AOx3, grossly intact, some left eye ptosis at baseline  ENMT: moist mucous membranes  Head/neck: atraumatic  CV: no tachycardia  Pulm: non-labored on room air  GI: soft, non-tender, non-distended  Skin: warm and dry  Musculoskeletal: moving all extremities  Extremities: RLE s/p guillotine BKA. Palpable R femoral and popliteal.    Labs:  Results from last 7 days   Lab Units 12/01/24  0555 11/30/24  2245 11/30/24  0839   WBC AUTO x10*3/uL 9.0 10.1 7.4   HEMOGLOBIN g/dL 8.2* 9.2* 9.4*   PLATELETS AUTO x10*3/uL 336 400 398      Results from last 7 days   Lab Units 11/30/24  0839 11/29/24  162  11/28/24  1616   SODIUM mmol/L 139 136 136   POTASSIUM mmol/L 3.9 4.2 4.3   CHLORIDE mmol/L 106 104 103   CO2 mmol/L 24 24 24   BUN mg/dL 9 7 9   CREATININE mg/dL 0.73 0.76 0.79   GLUCOSE mg/dL 120* 88 117*   MAGNESIUM mg/dL 1.97 1.98 2.22   PHOSPHORUS mg/dL 2.9 3.5 3.0      Results from last 7 days   Lab Units 11/29/24  1625 11/28/24  1616 11/28/24  0921   INR  1.3* 1.1 3.7*   PROTIME seconds 14.1* 12.9* 41.9*   APTT seconds 31  --   --       Results from last 7 days   Lab Units 12/01/24  0555 11/30/24  2245 11/30/24  1614   ANTI XA UNFRACTIONATED IU/mL 1.4* 0.2 0.2

## 2024-12-01 NOTE — PROGRESS NOTES
24-hour updates:  -plan back to OR tomorrow w/ vasc surg for finalization of BKA; NPO after MN, heparin ggt to  at 4 AM  -vasc surg says source control achieved, plan to stop antibiotics 48h s/p final OR  -pursuing midline for lab draws, meds (will not need PICC for prolonged IV antibiotic)  -PT eval for dispo recs      Subjective   Pain tolerable today. BM last night. Eating well. Updated on plan.       Objective     Last Recorded Vitals  /75   Pulse 70   Temp 36.6 °C (97.9 °F)   Resp 16   Wt 90.7 kg (199 lb 15.3 oz)   SpO2 100%   Intake/Output last 3 Shifts:    Intake/Output Summary (Last 24 hours) at 2024 1040  Last data filed at 2024 0536  Gross per 24 hour   Intake 350 ml   Output 1375 ml   Net -1025 ml       Admission Weight  Weight: 90.7 kg (200 lb) (24 1123)    Daily Weight  24 : 90.7 kg (199 lb 15.3 oz)    Image Results  Bedside Midline Imaging  These images are not reportable by radiology and will not be interpreted   by  Radiologists.      Physical Exam  Constitutional:       General: He is not in acute distress.     Appearance: He is not ill-appearing.   HENT:      Head: Normocephalic and atraumatic.      Mouth/Throat:      Mouth: Mucous membranes are moist.   Eyes:      Extraocular Movements: Extraocular movements intact.      Pupils: Pupils are equal, round, and reactive to light.      Comments: Prosthetic left eye   Cardiovascular:      Rate and Rhythm: Normal rate and regular rhythm.      Heart sounds: Normal heart sounds.   Pulmonary:      Effort: Pulmonary effort is normal.      Breath sounds: Normal breath sounds.   Abdominal:      General: Abdomen is flat.      Palpations: Abdomen is soft.   Musculoskeletal:      Left lower leg: No edema.   Feet:      Comments: Left hallux amputation with dry skin. Appears intact. Noted callus under the first MTP. Right side: lateral shin with a 3 cm wound with a pink dermis without purulence. Soft tissue defect in the  middle foot. Appears to have depth of approximately 1 cm. Yellow pus coming out. Edema in the R lower extremity greater than the L.     See media tab.  Neurological:      Mental Status: He is alert and oriented to person, place, and time.         Relevant Results               Assessment/Plan      Assessment & Plan  Osteomyelitis of right foot, unspecified type (Multi)    HTN (hypertension)    Paroxysmal atrial fibrillation (Multi)    JOSE (obstructive sleep apnea)    CVA (cerebral vascular accident) (Multi)    Pulmonary emphysema (Multi)    Dustin Ace is a 74 y.o. male with a history of BPH, centrilobular emphysema, chronic hepatitis C (treated), CVA, diabetes mellitus, hypertension, PAD, history of Charcot deformity, chronic right foot osteomyelitis, paroxysmal atrial fibrillation presenting for a chronic R foot wound. Patient met SIRS criteria on admission. Started on vanc and zosyn and got 500mL bolus, vitals now improved. MRI confirmed R foot OM.     Pre-Op Risk Assessment:  Labs :   - CBC, RFP, EKG, CXR, Coags, T&S  - Mild leukocytosis upon presentation, now resolved. Anemia of chronic disease with hgb stable between 8 to 10. ALT mildly elevated at 59 and t.bili 1.3 on 11/25. No renal dysfunction. On apixaban at home with coags on 11/25 showing INR 2.1 and PT 24.2. Most recent INR 1.1 on 11/28.  - Met SIRS criteria upon admission but started on broad spectrum antibiotics and he quickly improved. Vital signs stable while inpatient. On amlodipine at home, currently held given he is normotensive.  General Risk Assessment:   - History of stroke, PAD, paroxysmal afib, centrilobular emphysema, JOSE  - No history of CAD, CHF  - Patient has lifestyle controlled T2DM, not on Insulin at home, currently not requiring any while inpatient  - Apixaban held with low intensity heparin drip pending surgery, will stop morning of surgery  Cardiac Risk Assessment:   -Cardiology consulted for risk stratification per vascular  surgery request: TTE showed normal EF. CTA Coronary showed 50-70% in multiple coronary arteries. Moderate risk for surgery. No plan for LHC pre-operatively.    #Chronic infected right leg wound s/p multiple I&D's  #SIRS secondary to infected wound (resolved)  #Osteomyelitis  #Peripheral vascular disease  #Charcot deformity and neuropathy  - CRP: 16.25, ESR: 96  - SIRS criteria with source of infection, though no signs of end-organ damage currently, vitals improved with vanc, zosyn, and IVF bolus  - MRI showed OM in R foot  - Patient seen by podiatry, deemed R foot unsalvageable and recommended BKA  - ARMANDO showed mild disease in RLE, none in LLE    Cultures:  BCx (11/25): NGTD  Wound cultures (11/25): Pseudomonas and Strep gordonii     Antibiotics:  Vancomycin (11/25 - present)  Zosyn (11/25 - present)     Plan:  - Continue with vancomycin and Zosyn   - Continue with gabapentin  - PT/OT  - Tylenol 975 mg q8h PRN.   - Patient started on heparin drip to bridge to surgery while his home eliquis is being held  - Cardiology consulted for risk stratification per vascular surgery request  -To OR for R BKA with vascular surgery 11/29     #Hyperbilirubinemia  #Elevated ALT  - Noted to have a total bilirubin of 1.3. Direct bilirubin of 0.5.   - ALT elevated to 59  - Unclear etiology. Previously had a history of chronic hepatitis C that was treated.   - Elevated PT/INR, normal aPTT. On apixaban at home  Plan:  - Continue to monitor     #Diabetes mellitus  - Blood sugar in the 200s on BMP, may be secondary to stress hyperglycemia  Plan:  - POC glucose checks, will add sliding scale insulin if needed     #HTN  - Hold amlodipine in the setting of possible sepsis. Reintroduce as appropriate     #Paroxysmal Afib  - Hold apixaban for possible procedure/surgical intervention.   -Low intensity therapeutic heparin drip    #HLD  -continue home atorvastatin     #Anemia of chronic disease  - Baseline of approximately 12-13  - Presented at  9.0. Normocytic with elevated RDW  - Iron 25 (low), TIBC (low), UIBC 165, 13% sat, ferritin of 1,087, suggestive of anemia of chronic disease  - Retic absolute: 0.086, retic %: 2.7%. RPI <2, indicates inadequate response  Plan:   - Treat underlying infection     #Insomnia  #Anxiety  - Patient reports taking it approximately once per month  - Hold diazepam. Will give anxiolytic if needed     #BPH  - Continue tamsulosin     #MISC  - Continue B-complex with vitamin C  - Continue vitamin D  - Continue Maalox     F: PRN  E: PRN  N: regular diet  A: PIV  Bowel regimen: Miralax     DVT ppx: Holding home apixaban, heparin drip stopped for surgery  GI ppx: None    Code Status: Full code (confirmed on admission)  Surrogate Medical Decision-maker: Coco Finley (daughter): [phone]; Yanet Spencerlas (son): [phone]

## 2024-12-01 NOTE — CARE PLAN
The patient's goals for the shift include      The clinical goals for the shift include patient nely be free from fall/injury during the shift.    Problem: Fall/Injury  Goal: Not fall by end of shift  Outcome: Progressing  Goal: Be free from injury by end of the shift  Outcome: Progressing     Problem: Pain  Goal: Takes deep breaths with improved pain control throughout the shift  Outcome: Progressing     Problem: Skin  Goal: Decreased wound size/increased tissue granulation at next dressing change  Flowsheets (Taken 12/1/2024 0156)  Decreased wound size/increased tissue granulation at next dressing change: Promote sleep for wound healing  Goal: Participates in plan/prevention/treatment measures  Flowsheets (Taken 12/1/2024 0156)  Participates in plan/prevention/treatment measures: Elevate heels  Goal: Prevent/manage excess moisture  Flowsheets (Taken 12/1/2024 0156)  Prevent/manage excess moisture: Monitor for/manage infection if present  Goal: Prevent/minimize sheer/friction injuries  Flowsheets (Taken 12/1/2024 0156)  Prevent/minimize sheer/friction injuries: HOB 30 degrees or less  Goal: Promote/optimize nutrition  Flowsheets (Taken 12/1/2024 0156)  Promote/optimize nutrition:   Monitor/record intake including meals   Offer water/supplements/favorite foods

## 2024-12-01 NOTE — POST-PROCEDURE NOTE
Pre-Procedure Checklist:  Emergent Line Insertion: No  Type of Line to be Placed: Midline  Consent Obtained: N/A  Emergency Medication Necessary: No  Patient Identified with 2 Independent Identifiers: Yes  Review of Allergies, Anticoagulation, Relevant Labs, ECG/Telemetry: Yes  Risks/Benefits/Alternatives Discussed with Patient/POA/Legal Representative: Yes  Stop Sign on Door: Yes  Time Out Performed: Yes  Catheter Exchange: No    Positioning Checklist:  All People, Including Patient, in the Room with Cap and Mask: Yes  Fluoroscopy Used to Identify Vessel and Guide Insertion: No   Sterile Cover Used: Yes  Full Barrier Precautions Followed (Mask, Cap, Gown, Gloves): Yes  Hands Washed: Yes  Monitors Attached with Sound Alarms On: N/A  Full Body Sterile Drape (Head-to-Toe) Used to Cover Patient: Yes  Trendelenburg Position (For IJ and Subclavian): No  CHG Skin Prep Used and Allowed to Air Dry to Skin Procedure: Yes    Procedure Checklist:  Blood Aspirated From All Lumens, All Ports Subsequently Flushed: Yes  Catheter Caps Placed on All Lumens; Lumens Clamped: Yes  Maintain Guidewire Control Throughout, Ensuring Guidewire Removal: Yes  Maintain Sterile Field Throughout Insertion: Yes  Catheter Secured: Yes  Confirmatory Test of Venous Placement: Non-Pulsatile Blood    Post Procedure Checklist:  Date and Time Written on Dressing: Yes  Sharp and Wire Count and Safe Disposal of all Sharps/Wires: Yes  Sterile Dressing Applied Per Protocol: Yes  X-ray Ordered or ECG Image: N/A     PICC Insertion Details:  Size (Fr): 3  Lumen Type: Single   Catheter to Vein Ratio Less Than 50%: Yes  Total Length (cm): 12  External Length (cm): 0  Orientation: left  Location: Basilic  Site Prep: Chlorohexidine; Usual sterile procedure followed  Local Anesthetic: Injectable/Subcutaneous  Indication: * IV ABX   Insertion Team Members in the Room: NurseDixie LPN  Initial Extremity Circumference (cm): 29  Insertion Attempts: 1  Patient  Tolerance: Tolerated Well, Age Appropriate  Comfort Measures: Subcutaneous anesthetic; Verbal  Procedure Location: Bedside  Safety Measures: Patient specific safety measures addressed with RN  Estimated Blood Loss (mL): 0  Vessel Fully Compressible Proximally and Distally to Insertion Site: Yes  Brisk Blood Return Obtained and Line Draws Easily: Yes  Tip Location: Axilla   Line Confirmation: Blood Return   Lot #: [id number]  : Bard  MidLine ExP Date : 11/30/2025  Securement: Stat Lock  Post Procedure Checklist: Handoff with RN; Obtain all new IV tubing prior to use; Bed at lowest level and wheels locked; Line discharge information at bedside.  Additional Details: Line was inserted using Modified Seldinger's Technique.   Placed by: Abena Armstrong RN

## 2024-12-01 NOTE — PROGRESS NOTES
Vancomycin Dosing by Pharmacy- FOLLOW UP    Dustin Ace is a 74 y.o. year old male who Pharmacy has been consulted for vancomycin dosing for osteomyelitis/septic arthritis. Based on the patient's indication and renal status this patient is being dosed based on a goal AUC of 400-600.     Renal function is currently stable.    Current vancomycin dose: 1250 mg given every 12 hours    Estimated vancomycin AUC on current dose: 578 mg/L.hr     Visit Vitals  /75   Pulse 70   Temp 36.6 °C (97.9 °F)   Resp 16        Lab Results   Component Value Date    CREATININE 0.80 2024    CREATININE 0.73 2024    CREATININE 0.76 2024    CREATININE 0.79 2024        Patient weight is as follows:   Vitals:    24 0852   Weight: 90.7 kg (199 lb 15.3 oz)       Cultures:  Susceptibility data for the encounter in last 14 days.  Collected Specimen Info Organism Aztreonam Cefepime Ceftazidime Ceftriaxone Ciprofloxacin Levofloxacin Penicillin Piperacillin/Tazobactam Tetracycline Tobramycin Vancomycin   24 Tissue/Biopsy from Wound/Tissue Pseudomonas aeruginosa  S  S  S   S  S   S   S      Streptococcus gordonii     S    S   R   S        I/O last 3 completed shifts:  In: 1090 (12 mL/kg) [P.O.:1040; IV Piggyback:50]  Out: 1945 (21.4 mL/kg) [Urine:1945 (0.6 mL/kg/hr)]  Weight: 90.7 kg   I/O during current shift:  No intake/output data recorded.    Temp (24hrs), Av.6 °C (97.8 °F), Min:36 °C (96.8 °F), Max:36.9 °C (98.4 °F)      Assessment/Plan    Within goal AUC range. Continue current vancomycin regimen.    This dosing regimen is predicted by [employer] to result in the following pharmacokinetic parameters:  Loading dose: N/A  Regimen: 1250 mg IV every 12 hours.  Start time: 12:05 on 2024  Exposure target: AUC24 (range)400-600 mg/L.hr   [id number]: 509 mg/L.hr  AUC24,ss: 578 mg/L.hr  Probability of AUC24 > 400: 100 %  Ctrough,ss: 18.8 mg/L  Probability of Ctrough,ss > 20: 23 %      The next level will  be obtained on 12/4 at 0500. May be obtained sooner if clinically indicated.   Will continue to monitor renal function daily while on vancomycin and order serum creatinine at least every 48 hours if not already ordered.  Follow for continued vancomycin needs, clinical response, and signs/symptoms of toxicity.       Komal Mckinley, PharmD

## 2024-12-01 NOTE — PROGRESS NOTES
Physical Therapy    Physical Therapy Evaluation & Treatment    Patient Name: Dustin Ace  MRN: [id number]  Department: [place] [address]  Room: [facility]-A  Today's Date: 12/1/2024   Time Calculation  Start Time: 0839  Stop Time: 0905  Time Calculation (min): 26 min    Assessment/Plan   PT Assessment  PT Assessment Results: Decreased endurance, Impaired balance, Decreased mobility, Orthopedic restrictions, Pain  Rehab Prognosis: Good  Barriers to Discharge: none  Evaluation/Treatment Tolerance: Patient tolerated treatment well, Patient limited by pain  Medical Staff Made Aware: Yes  Strengths: Ability to acquire knowledge, Attitude of self  End of Session Communication: Bedside nurse  Assessment Comment: Pt with good tolerance to mobility. Pt was able to perform 2 trials of ambulation with standard walker and min/CGA. Pt initially min but able to progress to CGA by 3rd trial. Patient would benefit from skilled physical therapy to maximize functional mobility and safety. Pt is appropriate for mod intensity therapy when medically appropriate for DC.  End of Session Patient Position: Bed, 3 rail up, Alarm off, not on at start of session (CB in reach)   IP OR SWING BED PT PLAN  Inpatient or Swing Bed: Inpatient  PT Plan  Treatment/Interventions: Bed mobility, Transfer training, Gait training, Balance training, Strengthening, Endurance training, Therapeutic exercise, Therapeutic activity  PT Plan: Ongoing PT  PT Frequency: 4 times per week  PT Discharge Recommendations: Moderate intensity level of continued care  Equipment Recommended upon Discharge:  (none)  PT Recommended Transfer Status: Assist x1, Assistive device (standard walker or WW)  PT - OK to Discharge: Yes (meaning pt seen and dc rec made)  RN cleared prior to session     Subjective   General Visit Information:  General  Reason for Referral: 74 y.o. male presented to the ED 11/25 with nonhealing wound of his right foot, fevers, chronic osteomyelitis. S/p R ankle  disarticulation 11/29.  Past Medical History Relevant to Rehab: emphysema, BPH, CVA hx, PAD, Afib (on Eliquis), HTN, DM  Family/Caregiver Present: No  Prior to Session Communication: Bedside nurse  Patient Position Received: Bed, 3 rail up, Alarm off, not on at start of session  Preferred Learning Style: auditory, verbal  General Comment: Pt agreeable to PT  Home Living:  Home Living  Type of Home: Skilled Nursing facility  Home Adaptive Equipment: Cane, Walker rolling or standard (has standard and WW)  Home Access: Level entry  Home Living Comments: [facility] is from [facility]  Prior Level of Function:  Prior Function Per Pt/Caregiver Report  ADL Assistance:  (needs minor assist)  Homemaking Assistance:  (facility performs)  Prior Function Comments: Pt reports mostly IND in ADLs prior to admit while at facility but needed some assist. Pt states he uses cane and standard walker for ambulation. - pt would not elaborate further. Pt denies any falls.  Precautions:  Precautions  Hearing/Visual Limitations: WFL  LE Weight Bearing Status: Right Non-Weight Bearing  Medical Precautions: Fall precautions  Braces Applied: Pt with R knee immobilizer on upon entry  Precautions Comment: R ankle disarticulation    Objective   Pain:  Pain Assessment  Pain Assessment: 0-10  0-10 (Numeric) Pain Score: 5 - Moderate pain  Pain Type: Surgical pain  Pain Location: Ankle  Pain Orientation: Right  Pain Interventions: Repositioned, Ambulation/increased activity  Response to Interventions: Resting quietly  Cognition:  Cognition  Overall Cognitive Status: Within Functional Limits  Arousal/Alertness: Appropriate responses to stimuli  Orientation Level: Oriented X4  Following Commands: Follows multistep commands consistently    General Assessments:  Activity Tolerance  Endurance: Tolerates 10 - 20 min exercise with multiple rests    Sensation  Light Touch: No apparent deficits (denies n&t)    Strength  Strength Comments: LLE WFL, R hip  WFL  Coordination  Movements are Fluid and Coordinated: Yes    Postural Control  Postural Control: Within Functional Limits    Static Sitting Balance  Static Sitting-Balance Support: Bilateral upper extremity supported (l foot supported)  Static Sitting-Level of Assistance: Close supervision    Static Standing Balance  Static Standing-Balance Support: Bilateral upper extremity supported  Static Standing-Level of Assistance: Contact guard  Static Standing-Comment/Number of Minutes: standard walker  Functional Assessments:  Bed Mobility  Bed Mobility: Yes  Bed Mobility 1  Bed Mobility 1: Supine to sitting, Sitting to supine  Level of Assistance 1: Contact guard  Bed Mobility Comments 1: min vc for sequencing    Transfers  Transfer: Yes  Transfer 1  Transfer From 1: Sit to, Stand to  Transfer to 1: Stand, Sit  Technique 1: Sit to stand, Stand to sit  Transfer Device 1: Walker  Transfer Level of Assistance 1: Minimum assistance  Trials/Comments 1: 2 trials; min vc for hand placement, sequencing, and safety    Ambulation/Gait Training  Ambulation/Gait Training Performed: Yes  Ambulation/Gait Training 1  Surface 1: Level tile  Device 1: Standard walker (pt stating preference for standard walker over WW)  Assistance 1: Minimum assistance, Contact guard  Quality of Gait 1: Forward flexed posture (decreased foot clearance and karon; hopping with LLE)  Comments/Distance (ft) 1: 3ft R, 20ft x 2; min vc for safety, AD use, posture, and sequencing  Extremity/Trunk Assessments:  RLE   RLE :  (R hip WFL - knee and ankle not tested)  LLE   LLE : Within Functional Limits  Treatments:  Therapeutic Activity  Therapeutic Activity Performed: Yes  Therapeutic Activity 1: static/dynamic standing with standard walker and CGA x 2 min - pt mildly unsteady and with mild L trunk lean    Ambulation/Gait Training  2nd and 3rd trial above apart of tretament    Outcome Measures:  [facility] Basic Mobility  Turning from your back to your side while in  a flat bed without using bedrails: A little  Moving from lying on your back to sitting on the side of a flat bed without using bedrails: A little  Moving to and from bed to chair (including a wheelchair): A little  Standing up from a chair using your arms (e.g. wheelchair or bedside chair): A little  To walk in hospital room: A little  Climbing 3-5 steps with railing: Total  Basic Mobility - Total Score: 16    Encounter Problems       Encounter Problems (Active)       PT Problem       Patient will ambulate >60' with LRAD and Supervision        Start:  12/01/24    Expected End:  12/16/24            Patient will perform sit<>stand transfer with LRAD, and Supervision        Start:  12/01/24    Expected End:  12/16/24            Patient will complete supine to sit and sit to supine Independent        Start:  12/01/24    Expected End:  12/16/24            Pt will demo static/dynamic standing with LRAD and supervison x 2 min with 0 LOB       Start:  12/01/24    Expected End:  12/16/24                   Education Documentation  Precautions, taught by Maria Parker PT at 12/1/2024 12:23 PM.  Learner: Patient  Readiness: Acceptance  Method: Explanation, Demonstration  Response: Verbalizes Understanding, Needs Reinforcement    Body Mechanics, taught by Maria Parker PT at 12/1/2024 12:23 PM.  Learner: Patient  Readiness: Acceptance  Method: Explanation, Demonstration  Response: Verbalizes Understanding, Needs Reinforcement    Mobility Training, taught by Maria Parker PT at 12/1/2024 12:23 PM.  Learner: Patient  Readiness: Acceptance  Method: Explanation, Demonstration  Response: Verbalizes Understanding, Needs Reinforcement    Education Comments  No comments found.

## 2024-12-02 ENCOUNTER — ANESTHESIA EVENT (OUTPATIENT)
Dept: OPERATING ROOM | Facility: HOSPITAL | Age: 74
End: 2024-12-02
Payer: MEDICARE

## 2024-12-02 ENCOUNTER — ANESTHESIA (OUTPATIENT)
Dept: OPERATING ROOM | Facility: HOSPITAL | Age: 74
End: 2024-12-02
Payer: MEDICARE

## 2024-12-02 PROBLEM — E11.9 DIABETES MELLITUS, TYPE 2 (MULTI): Status: ACTIVE | Noted: 2024-12-02

## 2024-12-02 PROBLEM — E78.5 HYPERLIPIDEMIA: Status: ACTIVE | Noted: 2024-12-02

## 2024-12-02 PROBLEM — I25.10 CAD (CORONARY ARTERY DISEASE): Status: ACTIVE | Noted: 2024-12-02

## 2024-12-02 PROBLEM — B19.20 HEPATITIS C: Status: ACTIVE | Noted: 2024-12-02

## 2024-12-02 LAB
ABO GROUP (TYPE) IN BLOOD: NORMAL
ALBUMIN SERPL BCP-MCNC: 2.9 G/DL (ref 3.4–5)
ALBUMIN SERPL BCP-MCNC: 3.3 G/DL (ref 3.4–5)
ALP SERPL-CCNC: 54 U/L (ref 33–136)
ALT SERPL W P-5'-P-CCNC: 19 U/L (ref 10–52)
ANION GAP BLDA CALCULATED.4IONS-SCNC: 11 MMO/L (ref 10–25)
ANION GAP BLDA CALCULATED.4IONS-SCNC: 14 MMO/L (ref 10–25)
ANION GAP SERPL CALC-SCNC: 12 MMOL/L (ref 10–20)
ANION GAP SERPL CALC-SCNC: 13 MMOL/L (ref 10–20)
ANTIBODY SCREEN: NORMAL
APTT PPP: 22 SECONDS (ref 27–38)
AST SERPL W P-5'-P-CCNC: 12 U/L (ref 9–39)
BASE EXCESS BLDA CALC-SCNC: -2.6 MMOL/L (ref -2–3)
BASE EXCESS BLDA CALC-SCNC: -4.2 MMOL/L (ref -2–3)
BASOPHILS # BLD AUTO: 0.02 X10*3/UL (ref 0–0.1)
BASOPHILS NFR BLD AUTO: 0.3 %
BILIRUB SERPL-MCNC: 1.2 MG/DL (ref 0–1.2)
BLOOD EXPIRATION DATE: NORMAL
BLOOD EXPIRATION DATE: NORMAL
BODY TEMPERATURE: 37 DEGREES CELSIUS
BODY TEMPERATURE: 37 DEGREES CELSIUS
BUN SERPL-MCNC: 8 MG/DL (ref 6–23)
BUN SERPL-MCNC: 9 MG/DL (ref 6–23)
CA-I BLDA-SCNC: 1.17 MMOL/L (ref 1.1–1.33)
CA-I BLDA-SCNC: 1.21 MMOL/L (ref 1.1–1.33)
CALCIUM SERPL-MCNC: 7.9 MG/DL (ref 8.6–10.6)
CALCIUM SERPL-MCNC: 8.1 MG/DL (ref 8.6–10.6)
CHLORIDE BLDA-SCNC: 107 MMOL/L (ref 98–107)
CHLORIDE BLDA-SCNC: 107 MMOL/L (ref 98–107)
CHLORIDE SERPL-SCNC: 106 MMOL/L (ref 98–107)
CHLORIDE SERPL-SCNC: 110 MMOL/L (ref 98–107)
CO2 SERPL-SCNC: 22 MMOL/L (ref 21–32)
CO2 SERPL-SCNC: 24 MMOL/L (ref 21–32)
CREAT SERPL-MCNC: 0.68 MG/DL (ref 0.5–1.3)
CREAT SERPL-MCNC: 0.73 MG/DL (ref 0.5–1.3)
DISPENSE STATUS: NORMAL
DISPENSE STATUS: NORMAL
EGFRCR SERPLBLD CKD-EPI 2021: >90 ML/MIN/1.73M*2
EGFRCR SERPLBLD CKD-EPI 2021: >90 ML/MIN/1.73M*2
EOSINOPHIL # BLD AUTO: 0.15 X10*3/UL (ref 0–0.4)
EOSINOPHIL NFR BLD AUTO: 2.2 %
ERYTHROCYTE [DISTWIDTH] IN BLOOD BY AUTOMATED COUNT: 16.4 % (ref 11.5–14.5)
ERYTHROCYTE [DISTWIDTH] IN BLOOD BY AUTOMATED COUNT: 17.2 % (ref 11.5–14.5)
GLUCOSE BLD MANUAL STRIP-MCNC: 136 MG/DL (ref 74–99)
GLUCOSE BLD MANUAL STRIP-MCNC: 170 MG/DL (ref 74–99)
GLUCOSE BLD MANUAL STRIP-MCNC: 238 MG/DL (ref 74–99)
GLUCOSE BLD MANUAL STRIP-MCNC: 93 MG/DL (ref 74–99)
GLUCOSE BLD MANUAL STRIP-MCNC: 98 MG/DL (ref 74–99)
GLUCOSE BLDA-MCNC: 125 MG/DL (ref 74–99)
GLUCOSE BLDA-MCNC: 161 MG/DL (ref 74–99)
GLUCOSE SERPL-MCNC: 159 MG/DL (ref 74–99)
GLUCOSE SERPL-MCNC: 90 MG/DL (ref 74–99)
HCO3 BLDA-SCNC: 21 MMOL/L (ref 22–26)
HCO3 BLDA-SCNC: 22.6 MMOL/L (ref 22–26)
HCT VFR BLD AUTO: 28.6 % (ref 41–52)
HCT VFR BLD AUTO: 29.2 % (ref 41–52)
HCT VFR BLD EST: 24 % (ref 41–52)
HCT VFR BLD EST: 28 % (ref 41–52)
HGB BLD-MCNC: 8.7 G/DL (ref 13.5–17.5)
HGB BLD-MCNC: 9.2 G/DL (ref 13.5–17.5)
HGB BLDA-MCNC: 8 G/DL (ref 13.5–17.5)
HGB BLDA-MCNC: 9.4 G/DL (ref 13.5–17.5)
IMM GRANULOCYTES # BLD AUTO: 0.03 X10*3/UL (ref 0–0.5)
IMM GRANULOCYTES NFR BLD AUTO: 0.4 % (ref 0–0.9)
INHALED O2 CONCENTRATION: 60 %
INHALED O2 CONCENTRATION: 60 %
INR PPP: 1.4 (ref 0.9–1.1)
LACTATE BLDA-SCNC: 0.9 MMOL/L (ref 0.4–2)
LACTATE BLDA-SCNC: 1.5 MMOL/L (ref 0.4–2)
LYMPHOCYTES # BLD AUTO: 2.6 X10*3/UL (ref 0.8–3)
LYMPHOCYTES NFR BLD AUTO: 38.5 %
MAGNESIUM SERPL-MCNC: 1.85 MG/DL (ref 1.6–2.4)
MCH RBC QN AUTO: 27.5 PG (ref 26–34)
MCH RBC QN AUTO: 28.2 PG (ref 26–34)
MCHC RBC AUTO-ENTMCNC: 30.4 G/DL (ref 32–36)
MCHC RBC AUTO-ENTMCNC: 31.5 G/DL (ref 32–36)
MCV RBC AUTO: 90 FL (ref 80–100)
MCV RBC AUTO: 91 FL (ref 80–100)
MONOCYTES # BLD AUTO: 0.65 X10*3/UL (ref 0.05–0.8)
MONOCYTES NFR BLD AUTO: 9.6 %
NEUTROPHILS # BLD AUTO: 3.3 X10*3/UL (ref 1.6–5.5)
NEUTROPHILS NFR BLD AUTO: 49 %
NRBC BLD-RTO: 0 /100 WBCS (ref 0–0)
NRBC BLD-RTO: 0 /100 WBCS (ref 0–0)
OXYHGB MFR BLDA: 96.3 % (ref 94–98)
OXYHGB MFR BLDA: 96.8 % (ref 94–98)
PCO2 BLDA: 38 MM HG (ref 38–42)
PCO2 BLDA: 40 MM HG (ref 38–42)
PH BLDA: 7.35 PH (ref 7.38–7.42)
PH BLDA: 7.36 PH (ref 7.38–7.42)
PHOSPHATE SERPL-MCNC: 2.7 MG/DL (ref 2.5–4.9)
PLATELET # BLD AUTO: 310 X10*3/UL (ref 150–450)
PLATELET # BLD AUTO: 315 X10*3/UL (ref 150–450)
PO2 BLDA: 191 MM HG (ref 85–95)
PO2 BLDA: 197 MM HG (ref 85–95)
POTASSIUM BLDA-SCNC: 4 MMOL/L (ref 3.5–5.3)
POTASSIUM BLDA-SCNC: 4 MMOL/L (ref 3.5–5.3)
POTASSIUM SERPL-SCNC: 3.9 MMOL/L (ref 3.5–5.3)
POTASSIUM SERPL-SCNC: 4.2 MMOL/L (ref 3.5–5.3)
PRODUCT BLOOD TYPE: 7300
PRODUCT BLOOD TYPE: 7300
PRODUCT CODE: NORMAL
PRODUCT CODE: NORMAL
PROT SERPL-MCNC: 6.3 G/DL (ref 6.4–8.2)
PROTHROMBIN TIME: 15.5 SECONDS (ref 9.8–12.8)
RBC # BLD AUTO: 3.16 X10*6/UL (ref 4.5–5.9)
RBC # BLD AUTO: 3.26 X10*6/UL (ref 4.5–5.9)
RH FACTOR (ANTIGEN D): NORMAL
SAO2 % BLDA: 99 % (ref 94–100)
SAO2 % BLDA: 99 % (ref 94–100)
SODIUM BLDA-SCNC: 137 MMOL/L (ref 136–145)
SODIUM BLDA-SCNC: 138 MMOL/L (ref 136–145)
SODIUM SERPL-SCNC: 138 MMOL/L (ref 136–145)
SODIUM SERPL-SCNC: 141 MMOL/L (ref 136–145)
UNIT ABO: NORMAL
UNIT ABO: NORMAL
UNIT NUMBER: NORMAL
UNIT NUMBER: NORMAL
UNIT RH: NORMAL
UNIT RH: NORMAL
UNIT VOLUME: 350
UNIT VOLUME: 350
WBC # BLD AUTO: 6.5 X10*3/UL (ref 4.4–11.3)
WBC # BLD AUTO: 6.8 X10*3/UL (ref 4.4–11.3)
XM INTEP: NORMAL
XM INTEP: NORMAL

## 2024-12-02 PROCEDURE — 2500000001 HC RX 250 WO HCPCS SELF ADMINISTERED DRUGS (ALT 637 FOR MEDICARE OP)

## 2024-12-02 PROCEDURE — 2500000005 HC RX 250 GENERAL PHARMACY W/O HCPCS

## 2024-12-02 PROCEDURE — 99233 SBSQ HOSP IP/OBS HIGH 50: CPT | Performed by: INTERNAL MEDICINE

## 2024-12-02 PROCEDURE — 2500000004 HC RX 250 GENERAL PHARMACY W/ HCPCS (ALT 636 FOR OP/ED)

## 2024-12-02 PROCEDURE — P9016 RBC LEUKOCYTES REDUCED: HCPCS

## 2024-12-02 PROCEDURE — 2720000007 HC OR 272 NO HCPCS: Performed by: STUDENT IN AN ORGANIZED HEALTH CARE EDUCATION/TRAINING PROGRAM

## 2024-12-02 PROCEDURE — P9045 ALBUMIN (HUMAN), 5%, 250 ML: HCPCS | Mod: JZ

## 2024-12-02 PROCEDURE — 3600000008 HC OR TIME - EACH INCREMENTAL 1 MINUTE - PROCEDURE LEVEL THREE: Performed by: STUDENT IN AN ORGANIZED HEALTH CARE EDUCATION/TRAINING PROGRAM

## 2024-12-02 PROCEDURE — A6213 FOAM DRG >16<=48 SQ IN W/BDR: HCPCS | Performed by: STUDENT IN AN ORGANIZED HEALTH CARE EDUCATION/TRAINING PROGRAM

## 2024-12-02 PROCEDURE — 0Y6H0Z3 DETACHMENT AT RIGHT LOWER LEG, LOW, OPEN APPROACH: ICD-10-PCS | Performed by: STUDENT IN AN ORGANIZED HEALTH CARE EDUCATION/TRAINING PROGRAM

## 2024-12-02 PROCEDURE — 3600000003 HC OR TIME - INITIAL BASE CHARGE - PROCEDURE LEVEL THREE: Performed by: STUDENT IN AN ORGANIZED HEALTH CARE EDUCATION/TRAINING PROGRAM

## 2024-12-02 PROCEDURE — 2500000004 HC RX 250 GENERAL PHARMACY W/ HCPCS (ALT 636 FOR OP/ED): Performed by: STUDENT IN AN ORGANIZED HEALTH CARE EDUCATION/TRAINING PROGRAM

## 2024-12-02 PROCEDURE — 7100000001 HC RECOVERY ROOM TIME - INITIAL BASE CHARGE: Performed by: STUDENT IN AN ORGANIZED HEALTH CARE EDUCATION/TRAINING PROGRAM

## 2024-12-02 PROCEDURE — 7100000002 HC RECOVERY ROOM TIME - EACH INCREMENTAL 1 MINUTE: Performed by: STUDENT IN AN ORGANIZED HEALTH CARE EDUCATION/TRAINING PROGRAM

## 2024-12-02 PROCEDURE — 64905 NERVE PEDICLE TRANSFER: CPT | Performed by: STUDENT IN AN ORGANIZED HEALTH CARE EDUCATION/TRAINING PROGRAM

## 2024-12-02 PROCEDURE — 84132 ASSAY OF SERUM POTASSIUM: CPT

## 2024-12-02 PROCEDURE — 1100000001 HC PRIVATE ROOM DAILY

## 2024-12-02 PROCEDURE — 27880 AMPUTATION OF LOWER LEG: CPT | Performed by: STUDENT IN AN ORGANIZED HEALTH CARE EDUCATION/TRAINING PROGRAM

## 2024-12-02 PROCEDURE — 85730 THROMBOPLASTIN TIME PARTIAL: CPT

## 2024-12-02 PROCEDURE — 80069 RENAL FUNCTION PANEL: CPT | Mod: CCI

## 2024-12-02 PROCEDURE — 36620 INSERTION CATHETER ARTERY: CPT

## 2024-12-02 PROCEDURE — 37799 UNLISTED PX VASCULAR SURGERY: CPT

## 2024-12-02 PROCEDURE — 82947 ASSAY GLUCOSE BLOOD QUANT: CPT

## 2024-12-02 PROCEDURE — 2500000005 HC RX 250 GENERAL PHARMACY W/O HCPCS: Performed by: STUDENT IN AN ORGANIZED HEALTH CARE EDUCATION/TRAINING PROGRAM

## 2024-12-02 PROCEDURE — 85025 COMPLETE CBC W/AUTO DIFF WBC: CPT

## 2024-12-02 PROCEDURE — 3700000002 HC GENERAL ANESTHESIA TIME - EACH INCREMENTAL 1 MINUTE: Performed by: STUDENT IN AN ORGANIZED HEALTH CARE EDUCATION/TRAINING PROGRAM

## 2024-12-02 PROCEDURE — 01SH0ZZ REPOSITION PERONEAL NERVE, OPEN APPROACH: ICD-10-PCS | Performed by: STUDENT IN AN ORGANIZED HEALTH CARE EDUCATION/TRAINING PROGRAM

## 2024-12-02 PROCEDURE — 3700000001 HC GENERAL ANESTHESIA TIME - INITIAL BASE CHARGE: Performed by: STUDENT IN AN ORGANIZED HEALTH CARE EDUCATION/TRAINING PROGRAM

## 2024-12-02 PROCEDURE — A27880 PR AMPUTATION LOW LEG THRU TIB/FIB: Performed by: ANESTHESIOLOGY

## 2024-12-02 PROCEDURE — 83735 ASSAY OF MAGNESIUM: CPT

## 2024-12-02 PROCEDURE — 36430 TRANSFUSION BLD/BLD COMPNT: CPT

## 2024-12-02 PROCEDURE — 2500000002 HC RX 250 W HCPCS SELF ADMINISTERED DRUGS (ALT 637 FOR MEDICARE OP, ALT 636 FOR OP/ED)

## 2024-12-02 PROCEDURE — 99100 ANES PT EXTEME AGE<1 YR&>70: CPT | Performed by: ANESTHESIOLOGY

## 2024-12-02 PROCEDURE — 86900 BLOOD TYPING SEROLOGIC ABO: CPT

## 2024-12-02 PROCEDURE — 01SG0ZZ REPOSITION TIBIAL NERVE, OPEN APPROACH: ICD-10-PCS | Performed by: STUDENT IN AN ORGANIZED HEALTH CARE EDUCATION/TRAINING PROGRAM

## 2024-12-02 PROCEDURE — 85027 COMPLETE CBC AUTOMATED: CPT

## 2024-12-02 RX ORDER — HYDROMORPHONE HYDROCHLORIDE 1 MG/ML
INJECTION, SOLUTION INTRAMUSCULAR; INTRAVENOUS; SUBCUTANEOUS AS NEEDED
Status: DISCONTINUED | OUTPATIENT
Start: 2024-12-02 | End: 2024-12-02

## 2024-12-02 RX ORDER — PROPOFOL 10 MG/ML
INJECTION, EMULSION INTRAVENOUS
Status: COMPLETED
Start: 2024-12-02 | End: 2024-12-02

## 2024-12-02 RX ORDER — OXYCODONE HYDROCHLORIDE 5 MG/1
10 TABLET ORAL EVERY 4 HOURS PRN
Status: DISCONTINUED | OUTPATIENT
Start: 2024-12-02 | End: 2024-12-02 | Stop reason: HOSPADM

## 2024-12-02 RX ORDER — CEFAZOLIN 1 G/1
INJECTION, POWDER, FOR SOLUTION INTRAVENOUS AS NEEDED
Status: DISCONTINUED | OUTPATIENT
Start: 2024-12-02 | End: 2024-12-02

## 2024-12-02 RX ORDER — ONDANSETRON HYDROCHLORIDE 2 MG/ML
4 INJECTION, SOLUTION INTRAVENOUS ONCE AS NEEDED
Status: DISCONTINUED | OUTPATIENT
Start: 2024-12-02 | End: 2024-12-02 | Stop reason: HOSPADM

## 2024-12-02 RX ORDER — PROPOFOL 10 MG/ML
INJECTION, EMULSION INTRAVENOUS AS NEEDED
Status: DISCONTINUED | OUTPATIENT
Start: 2024-12-02 | End: 2024-12-02

## 2024-12-02 RX ORDER — FENTANYL CITRATE 50 UG/ML
INJECTION, SOLUTION INTRAMUSCULAR; INTRAVENOUS AS NEEDED
Status: DISCONTINUED | OUTPATIENT
Start: 2024-12-02 | End: 2024-12-02

## 2024-12-02 RX ORDER — ROCURONIUM BROMIDE 10 MG/ML
INJECTION, SOLUTION INTRAVENOUS
Status: COMPLETED
Start: 2024-12-02 | End: 2024-12-02

## 2024-12-02 RX ORDER — DIPHENHYDRAMINE HYDROCHLORIDE 50 MG/ML
25 INJECTION INTRAMUSCULAR; INTRAVENOUS ONCE AS NEEDED
Status: DISCONTINUED | OUTPATIENT
Start: 2024-12-02 | End: 2024-12-02 | Stop reason: HOSPADM

## 2024-12-02 RX ORDER — ALBUTEROL SULFATE 0.83 MG/ML
2.5 SOLUTION RESPIRATORY (INHALATION) ONCE AS NEEDED
Status: DISCONTINUED | OUTPATIENT
Start: 2024-12-02 | End: 2024-12-02 | Stop reason: HOSPADM

## 2024-12-02 RX ORDER — MIDAZOLAM HYDROCHLORIDE 1 MG/ML
INJECTION INTRAMUSCULAR; INTRAVENOUS AS NEEDED
Status: DISCONTINUED | OUTPATIENT
Start: 2024-12-02 | End: 2024-12-02

## 2024-12-02 RX ORDER — HYDROMORPHONE HYDROCHLORIDE 0.2 MG/ML
0.2 INJECTION INTRAMUSCULAR; INTRAVENOUS; SUBCUTANEOUS EVERY 5 MIN PRN
Status: DISCONTINUED | OUTPATIENT
Start: 2024-12-02 | End: 2024-12-02 | Stop reason: HOSPADM

## 2024-12-02 RX ORDER — BUPIVACAINE HYDROCHLORIDE 2.5 MG/ML
INJECTION, SOLUTION INFILTRATION; PERINEURAL AS NEEDED
Status: DISCONTINUED | OUTPATIENT
Start: 2024-12-02 | End: 2024-12-02 | Stop reason: HOSPADM

## 2024-12-02 RX ORDER — MAGNESIUM SULFATE HEPTAHYDRATE 40 MG/ML
2 INJECTION, SOLUTION INTRAVENOUS ONCE
Status: COMPLETED | OUTPATIENT
Start: 2024-12-02 | End: 2024-12-02

## 2024-12-02 RX ORDER — ROCURONIUM BROMIDE 10 MG/ML
INJECTION, SOLUTION INTRAVENOUS AS NEEDED
Status: DISCONTINUED | OUTPATIENT
Start: 2024-12-02 | End: 2024-12-02

## 2024-12-02 RX ORDER — PHENYLEPHRINE 10 MG/250 ML(40 MCG/ML)IN 0.9 % SOD.CHLORIDE INTRAVENOUS
CONTINUOUS PRN
Status: DISCONTINUED | OUTPATIENT
Start: 2024-12-02 | End: 2024-12-02

## 2024-12-02 RX ORDER — ROCURONIUM BROMIDE 10 MG/ML
INJECTION, SOLUTION INTRAVENOUS
Status: DISCONTINUED
Start: 2024-12-02 | End: 2024-12-07 | Stop reason: HOSPADM

## 2024-12-02 RX ORDER — ALBUMIN HUMAN 50 G/1000ML
SOLUTION INTRAVENOUS
Status: COMPLETED
Start: 2024-12-02 | End: 2024-12-02

## 2024-12-02 RX ORDER — ALBUMIN HUMAN 50 G/1000ML
SOLUTION INTRAVENOUS AS NEEDED
Status: DISCONTINUED | OUTPATIENT
Start: 2024-12-02 | End: 2024-12-02

## 2024-12-02 RX ORDER — LABETALOL HYDROCHLORIDE 5 MG/ML
5 INJECTION, SOLUTION INTRAVENOUS ONCE AS NEEDED
Status: DISCONTINUED | OUTPATIENT
Start: 2024-12-02 | End: 2024-12-02 | Stop reason: HOSPADM

## 2024-12-02 RX ORDER — FENTANYL CITRATE 50 UG/ML
INJECTION, SOLUTION INTRAMUSCULAR; INTRAVENOUS
Status: COMPLETED
Start: 2024-12-02 | End: 2024-12-02

## 2024-12-02 RX ORDER — VANCOMYCIN HYDROCHLORIDE 1 G/20ML
INJECTION, POWDER, LYOPHILIZED, FOR SOLUTION INTRAVENOUS AS NEEDED
Status: DISCONTINUED | OUTPATIENT
Start: 2024-12-02 | End: 2024-12-02 | Stop reason: HOSPADM

## 2024-12-02 RX ORDER — ACETAMINOPHEN 325 MG/1
650 TABLET ORAL EVERY 4 HOURS PRN
Status: DISCONTINUED | OUTPATIENT
Start: 2024-12-02 | End: 2024-12-02 | Stop reason: HOSPADM

## 2024-12-02 RX ORDER — LIDOCAINE HCL/PF 100 MG/5ML
SYRINGE (ML) INTRAVENOUS AS NEEDED
Status: DISCONTINUED | OUTPATIENT
Start: 2024-12-02 | End: 2024-12-02

## 2024-12-02 RX ORDER — ONDANSETRON HYDROCHLORIDE 2 MG/ML
INJECTION, SOLUTION INTRAVENOUS
Status: COMPLETED
Start: 2024-12-02 | End: 2024-12-02

## 2024-12-02 RX ORDER — ONDANSETRON HYDROCHLORIDE 2 MG/ML
INJECTION, SOLUTION INTRAVENOUS AS NEEDED
Status: DISCONTINUED | OUTPATIENT
Start: 2024-12-02 | End: 2024-12-02

## 2024-12-02 RX ORDER — NORETHINDRONE AND ETHINYL ESTRADIOL 0.5-0.035
KIT ORAL AS NEEDED
Status: DISCONTINUED | OUTPATIENT
Start: 2024-12-02 | End: 2024-12-02

## 2024-12-02 RX ORDER — NORETHINDRONE AND ETHINYL ESTRADIOL 0.5-0.035
KIT ORAL
Status: COMPLETED
Start: 2024-12-02 | End: 2024-12-02

## 2024-12-02 RX ORDER — SODIUM CHLORIDE 0.9 G/100ML
IRRIGANT IRRIGATION AS NEEDED
Status: DISCONTINUED | OUTPATIENT
Start: 2024-12-02 | End: 2024-12-02 | Stop reason: HOSPADM

## 2024-12-02 RX ORDER — OXYCODONE HYDROCHLORIDE 5 MG/1
5 TABLET ORAL EVERY 4 HOURS PRN
Status: DISCONTINUED | OUTPATIENT
Start: 2024-12-02 | End: 2024-12-02 | Stop reason: HOSPADM

## 2024-12-02 RX ORDER — MIDAZOLAM HYDROCHLORIDE 1 MG/ML
INJECTION INTRAMUSCULAR; INTRAVENOUS
Status: COMPLETED
Start: 2024-12-02 | End: 2024-12-02

## 2024-12-02 RX ORDER — LIDOCAINE HYDROCHLORIDE 10 MG/ML
0.1 INJECTION, SOLUTION INFILTRATION; PERINEURAL ONCE
Status: DISCONTINUED | OUTPATIENT
Start: 2024-12-02 | End: 2024-12-02 | Stop reason: HOSPADM

## 2024-12-02 RX ORDER — LIDOCAINE HCL/PF 100 MG/5ML
SYRINGE (ML) INTRAVENOUS
Status: COMPLETED
Start: 2024-12-02 | End: 2024-12-02

## 2024-12-02 RX ORDER — PHENYLEPHRINE HCL IN 0.9% NACL 0.4MG/10ML
SYRINGE (ML) INTRAVENOUS AS NEEDED
Status: DISCONTINUED | OUTPATIENT
Start: 2024-12-02 | End: 2024-12-02

## 2024-12-02 RX ORDER — SODIUM CHLORIDE, SODIUM LACTATE, POTASSIUM CHLORIDE, CALCIUM CHLORIDE 600; 310; 30; 20 MG/100ML; MG/100ML; MG/100ML; MG/100ML
100 INJECTION, SOLUTION INTRAVENOUS CONTINUOUS
Status: DISCONTINUED | OUTPATIENT
Start: 2024-12-02 | End: 2024-12-02 | Stop reason: HOSPADM

## 2024-12-02 RX ORDER — HYDROMORPHONE HYDROCHLORIDE 1 MG/ML
INJECTION, SOLUTION INTRAMUSCULAR; INTRAVENOUS; SUBCUTANEOUS
Status: DISCONTINUED
Start: 2024-12-02 | End: 2024-12-07 | Stop reason: HOSPADM

## 2024-12-02 RX ORDER — PHENYLEPHRINE HCL IN 0.9% NACL 0.4MG/10ML
SYRINGE (ML) INTRAVENOUS
Status: COMPLETED
Start: 2024-12-02 | End: 2024-12-02

## 2024-12-02 ASSESSMENT — PAIN - FUNCTIONAL ASSESSMENT
PAIN_FUNCTIONAL_ASSESSMENT: 0-10

## 2024-12-02 ASSESSMENT — COGNITIVE AND FUNCTIONAL STATUS - GENERAL
TOILETING: A LITTLE
MOVING TO AND FROM BED TO CHAIR: A LITTLE
WALKING IN HOSPITAL ROOM: A LITTLE
TOILETING: A LITTLE
WALKING IN HOSPITAL ROOM: A LITTLE
TURNING FROM BACK TO SIDE WHILE IN FLAT BAD: A LITTLE
MOBILITY SCORE: 18
HELP NEEDED FOR BATHING: A LITTLE
CLIMB 3 TO 5 STEPS WITH RAILING: A LOT
STANDING UP FROM CHAIR USING ARMS: A LITTLE
CLIMB 3 TO 5 STEPS WITH RAILING: A LOT
MOBILITY SCORE: 19
DAILY ACTIVITIY SCORE: 22
DAILY ACTIVITIY SCORE: 23
STANDING UP FROM CHAIR USING ARMS: A LITTLE
MOVING TO AND FROM BED TO CHAIR: A LITTLE

## 2024-12-02 ASSESSMENT — PAIN SCALES - GENERAL
PAINLEVEL_OUTOF10: 9
PAINLEVEL_OUTOF10: 10 - WORST POSSIBLE PAIN
PAINLEVEL_OUTOF10: 8
PAINLEVEL_OUTOF10: 5 - MODERATE PAIN
PAINLEVEL_OUTOF10: 10 - WORST POSSIBLE PAIN
PAINLEVEL_OUTOF10: 7
PAINLEVEL_OUTOF10: 6
PAINLEVEL_OUTOF10: 10 - WORST POSSIBLE PAIN
PAINLEVEL_OUTOF10: 6
PAINLEVEL_OUTOF10: 8
PAINLEVEL_OUTOF10: 0 - NO PAIN
PAINLEVEL_OUTOF10: 10 - WORST POSSIBLE PAIN
PAINLEVEL_OUTOF10: 9
PAINLEVEL_OUTOF10: 10 - WORST POSSIBLE PAIN

## 2024-12-02 ASSESSMENT — PAIN DESCRIPTION - ORIENTATION: ORIENTATION: RIGHT

## 2024-12-02 ASSESSMENT — PAIN DESCRIPTION - LOCATION: LOCATION: LEG

## 2024-12-02 ASSESSMENT — PAIN DESCRIPTION - DESCRIPTORS: DESCRIPTORS: ACHING;DISCOMFORT

## 2024-12-02 NOTE — PROGRESS NOTES
Subjective   NAEO. Pain controlled. Waiting to go to OR today.       Objective     Last Recorded Vitals  /72   Pulse 68   Temp 36.6 °C (97.9 °F)   Resp 18   Wt 90.7 kg (199 lb 15.3 oz)   SpO2 100%   Intake/Output last 3 Shifts:    Intake/Output Summary (Last 24 hours) at 12/2/2024 0730  Last data filed at 12/2/2024 0011  Gross per 24 hour   Intake 910 ml   Output 1175 ml   Net -265 ml       Admission Weight  Weight: 90.7 kg (200 lb) (11/25/24 1123)    Daily Weight  11/29/24 : 90.7 kg (199 lb 15.3 oz)    Image Results  Bedside Midline Imaging  These images are not reportable by radiology and will not be interpreted   by  Radiologists.      Physical Exam  Constitutional:       General: He is not in acute distress.     Appearance: He is not ill-appearing.   HENT:      Head: Normocephalic and atraumatic.      Mouth/Throat:      Mouth: Mucous membranes are moist.   Eyes:      Extraocular Movements: Extraocular movements intact.      Pupils: Pupils are equal, round, and reactive to light.      Comments: Prosthetic left eye   Cardiovascular:      Rate and Rhythm: Normal rate and regular rhythm.      Heart sounds: Normal heart sounds.   Pulmonary:      Effort: Pulmonary effort is normal.      Breath sounds: Normal breath sounds.   Abdominal:      General: Abdomen is flat.      Palpations: Abdomen is soft.   Musculoskeletal:      Left lower leg: No edema.   Feet:      Comments: Left hallux amputation with dry skin. Appears intact. Noted callus under the first MTP.     Right side: Wound from BKA dressed  Neurological:      Mental Status: He is alert and oriented to person, place, and time.         Relevant Results               Assessment/Plan      Assessment & Plan  Osteomyelitis of right foot, unspecified type (Multi)    HTN (hypertension)    Paroxysmal atrial fibrillation (Multi)    JOSE (obstructive sleep apnea)    CVA (cerebral vascular accident) (Multi)    Pulmonary emphysema (Multi)    Dustin Ace is a 74  y.o. male with a history of BPH, centrilobular emphysema, chronic hepatitis C (treated), CVA, diabetes mellitus, hypertension, PAD, history of Charcot deformity, chronic right foot osteomyelitis, paroxysmal atrial fibrillation presenting for a chronic R foot wound. Patient met SIRS criteria on admission. Started on vanc and zosyn and got 500mL bolus, vitals now improved. MRI confirmed R foot OM. To undergo R BKA with vascular surgery.    Updates 12/2:  -to OR today with vascular surgery for finalization of BKA    Pre-Op Risk Assessment:  Labs :   - CBC, RFP, EKG, CXR, Coags, T&S  - Mild leukocytosis upon presentation, now resolved. Anemia of chronic disease with hgb stable between 8 to 10. ALT mildly elevated at 59 and t.bili 1.3 on 11/25. No renal dysfunction. On apixaban at home with coags on 11/25 showing INR 2.1 and PT 24.2. Most recent INR 1.4 on 12/2.  - Met SIRS criteria upon admission but started on broad spectrum antibiotics and he quickly improved. Vital signs stable while inpatient. On amlodipine at home, currently held given he is normotensive.  General Risk Assessment:   - History of stroke, PAD, paroxysmal afib, centrilobular emphysema, JOSE  - No history of CAD, CHF  - Patient has lifestyle controlled T2DM, not on Insulin at home, currently not requiring any while inpatient  - Apixaban held with low intensity heparin drip pending surgery, will stop morning of surgery  Cardiac Risk Assessment:   -Cardiology consulted for risk stratification per vascular surgery request: TTE showed normal EF. CTA Coronary showed 50-70% in multiple coronary arteries. Moderate risk for surgery. No plan for LHC pre-operatively.    #Chronic infected right leg wound s/p multiple I&D's  #SIRS secondary to infected wound (resolved)  #Osteomyelitis  #Peripheral vascular disease  #Charcot deformity and neuropathy  - CRP: 16.25, ESR: 96  - SIRS criteria with source of infection, though no signs of end-organ damage currently,  vitals improved with vanc, zosyn, and IVF bolus  - MRI showed OM in R foot  - Patient seen by podiatry, deemed R foot unsalvageable and recommended BKA  - ARMANDO showed mild disease in RLE, none in LLE    Cultures:  BCx (11/25): NGTD  Wound cultures (11/25): Pseudomonas and Strep gordonii     Antibiotics:  Vancomycin (11/25 - present)  Zosyn (11/25 - present)     Plan:  - Continue with vancomycin and Zosyn   - Continue with gabapentin  - PT/OT  - Tylenol 975 mg q8h PRN.   - Patient started on heparin drip to bridge to surgery while his home eliquis is being held  - Cardiology consulted for risk stratification per vascular surgery request  -R BKA with vascular surgery 11/29  -To OR today for finalization of R BKA     #Hyperbilirubinemia  #Elevated ALT  - Noted to have a total bilirubin of 1.3. Direct bilirubin of 0.5.   - ALT elevated to 59  - Unclear etiology. Previously had a history of chronic hepatitis C that was treated.   - Elevated PT/INR, normal aPTT. On apixaban at home  Plan:  - Continue to monitor     #Diabetes mellitus  - A1C 5.6 on 11/25  Plan:  - POC glucose checks, will add sliding scale insulin if needed     #HTN  - Hold amlodipine in the setting of possible sepsis. Reintroduce as appropriate     #Paroxysmal Afib  - Hold apixaban for possible procedure/surgical intervention.   -Low intensity therapeutic heparin drip    #HLD  -continue home atorvastatin     #Anemia of chronic disease  - Baseline of approximately 12-13  - Presented at 9.0. Normocytic with elevated RDW  - Iron 25 (low), TIBC (low), UIBC 165, 13% sat, ferritin of 1,087, suggestive of anemia of chronic disease  - Retic absolute: 0.086, retic %: 2.7%. RPI <2, indicates inadequate response  Plan:   - Treat underlying infection     #Insomnia  #Anxiety  - Patient reports taking it approximately once per month  - Hold diazepam. Will give anxiolytic if needed     #BPH  - Continue tamsulosin     #MISC  - Continue B-complex with vitamin C  - Continue  vitamin D  - Continue Maalox     F: PRN  E: PRN  N: regular diet  A: PIV  Bowel regimen: Miralax     DVT ppx: Holding home apixaban, heparin drip stopped for surgery  GI ppx: None    Code Status: Full code (confirmed on admission)  Surrogate Medical Decision-maker: Coco Finley (daughter): [phone]; Yanet Oropeza (son): [phone]

## 2024-12-02 NOTE — BRIEF OP NOTE
Date: 2024  OR Location: [facility] OR    Name: Dustin Ace, : 1950, Age: 74 y.o., MRN: [id number], Sex: male    Diagnosis  Pre-op Diagnosis      * Osteomyelitis of right foot, unspecified type (Multi) [M86.9] Post-op Diagnosis     * Osteomyelitis of right foot, unspecified type (Multi) [M86.9]     Procedures  formalization of R BKA with TMR  47850 - CA AMP LEG THRU TIBFIB W/IMMT FITG TQ W/1ST CST      Surgeons      * Mrinalini Gates - Primary    Resident/Fellow/Other Assistant:  Surgeons and Role:  * No surgeons found with a matching role *    Staff:   Circulator: Maddi  Scrub Person: Lary  Relief Circulator: Shannon  Relief Scrub: Shannon  Relief Scrub: Marifer  Relief Circulator: Lary    Anesthesia Staff: Anesthesiologist: Ana Kurtz MD; Hemanth Garcia MD  CRNA: PATY Calero-CRNA  Anesthesia Resident: Kalin Aguilar MD    Procedure Summary  Anesthesia: General  ASA: III  Estimated Blood Loss: 150mL  Intra-op Medications:   Administrations occurring from 1045 to 1325 on 24:   Medication Name Total Dose   sodium chloride 0.9 % irrigation solution 500 mL   piperacillin-tazobactam (Zosyn) 3.375 g in dextrose (iso) IV 50 mL 3.375 g   albumin human infusion 5 %  - Omnicell Override Pull Cannot be calculated   dexAMETHasone (Decadron) injection 4 mg/mL  - Omnicell Override Pull Cannot be calculated   ePHEDrine injection 50 mg/mL  - Omnicell Override Pull Cannot be calculated   fentaNYL PF (Sublimaze) injection 50 mcg/mL  - Omnicell Override Pull Cannot be calculated   ketamine in NaCl, iso-osmotic injection 50 mg/5 mL (10 mg/mL)  - Omnicell Override Pull Cannot be calculated   ketamine in NaCl, iso-osmotic injection 50 mg/5 mL (10 mg/mL)  - Omnicell Override Pull Cannot be calculated   lidocaine (cardiac) (Xylocaine) injection 100 mg/5 mL (2 %)  - Omnicell Override Pull Cannot be calculated   magnesium sulfate 2 g in sterile water for injection 50 mL Cannot be calculated   midazolam  (Versed) injection 1 mg/mL  - Omnicell Override Pull Cannot be calculated   phenylephrine HCl in 0.9% NaCl syringe 0.4 mg/10 mL (40 mcg/mL)  - Omnicell Override Pull Cannot be calculated   propofol (Diprivan) injection 10 mg/mL  - Omnicell Override Pull Cannot be calculated   rocuronium (ZeMuron) injection 10 mg/mL  - Omnicell Override Pull Cannot be calculated   albumin human 5 % 250 mL   ceFAZolin (Ancef) 1 g 2 g   dexAMETHasone (Decadron) injection 4 mg/mL 8 mg   ePHEDrine injection 40 mg   ketamine injection 50 mg/ 5 mL (10 mg/mL) 60 mg   LR bolus Cannot be calculated   lidocaine (cardiac) injection 2% prefilled syringe 100 mg   midazolam PF (Versed) injection 1 mg/mL 2 mg   phenylephrine 40 mcg/mL syringe 10 mL 360 mcg   propofol (Diprivan) injection 10 mg/mL 150 mg   remifentanil (Ultiva) 1,000 mcg in lactated Ringer's 50 mL (20 mcg/mL) infusion 0.11 mg   rocuronium (ZeMuron) 50 mg/5 mL injection 60 mg              Anesthesia Record               Intraprocedure I/O Totals          Intake    PRBC 350.00 mL    LR bolus 850.00 mL    Remifentanil Drip 0.00 mL    The total shown is the total volume documented since Anesthesia Start was filed.    Phenylephrine Drip 0.00 mL    The total shown is the total volume documented since Anesthesia Start was filed.    albumin human 5 % 500.00 mL    piperacillin-tazobactam (Zosyn) 3.375 g in dextrose (iso) IV 50 mL 300.00 mL    Total Intake 2000 mL       Output    Urine 1500 mL    Est. Blood Loss 250 mL    Total Output 1750 mL       Net    Net Volume 250 mL          Specimen:   ID Type Source Tests Collected by Time   1 : RIGHT LEG Tissue LEG AMPUTATION BELOW THE KNEE RIGHT SURGICAL PATHOLOGY EXAM Sis Gates MD 12/2/2024 1403                  Findings: TMR performed with deep peroneal, superficial peroneal nerves. Tissue healthy appearing. Flap closed without significant tension, hemostasis achieved.     Complications:  None; patient tolerated the procedure well.      Disposition: PACU - hemodynamically stable.  Condition: stable  Specimens Collected:   ID Type Source Tests Collected by Time   1 : RIGHT LEG Tissue LEG AMPUTATION BELOW THE KNEE RIGHT SURGICAL PATHOLOGY EXAM Sis Gates MD 12/2/2024 7952     Attending Attestation: I was present and scrubbed for the entire procedure.    Sis Gates  Phone Number: [phone]

## 2024-12-02 NOTE — CARE PLAN
The patient's goals for the shift include Patient pain will be controled during shift    The clinical goals for the shift include Patient will remain HDS during shift    Other goals include:  Problem: Fall/Injury  Goal: Not fall by end of shift  Outcome: Progressing  Goal: Be free from injury by end of the shift  Outcome: Progressing  Goal: Verbalize understanding of personal risk factors for fall in the hospital  Outcome: Progressing  Goal: Verbalize understanding of risk factor reduction measures to prevent injury from fall in the home  Outcome: Progressing  Goal: Use assistive devices by end of the shift  Outcome: Progressing  Goal: Pace activities to prevent fatigue by end of the shift  Outcome: Progressing     Problem: Pain  Goal: Takes deep breaths with improved pain control throughout the shift  Outcome: Progressing  Goal: Turns in bed with improved pain control throughout the shift  Outcome: Progressing  Goal: Walks with improved pain control throughout the shift  Outcome: Progressing  Goal: Performs ADL's with improved pain control throughout shift  Outcome: Progressing  Goal: Participates in PT with improved pain control throughout the shift  Outcome: Progressing  Goal: Free from opioid side effects throughout the shift  Outcome: Progressing  Goal: Free from acute confusion related to pain meds throughout the shift  Outcome: Progressing     Problem: Skin  Goal: Decreased wound size/increased tissue granulation at next dressing change  Outcome: Progressing  Flowsheets (Taken 12/2/2024 5829)  Decreased wound size/increased tissue granulation at next dressing change: Promote sleep for wound healing  Goal: Participates in plan/prevention/treatment measures  Outcome: Progressing  Flowsheets (Taken 12/2/2024 9296)  Participates in plan/prevention/treatment measures:   Elevate heels   Discuss with provider PT/OT consult  Goal: Prevent/manage excess moisture  Outcome: Progressing  Flowsheets (Taken 12/2/2024  1759)  Prevent/manage excess moisture:   Monitor for/manage infection if present   Follow provider orders for dressing changes  Goal: Prevent/minimize sheer/friction injuries  Outcome: Progressing  Flowsheets (Taken 12/2/2024 1759)  Prevent/minimize sheer/friction injuries:   Increase activity/out of bed for meals   HOB 30 degrees or less  Goal: Promote/optimize nutrition  Outcome: Progressing  Flowsheets (Taken 12/2/2024 1759)  Promote/optimize nutrition:   Assist with feeding   Consume > 50% meals/supplements  Goal: Promote skin healing  Outcome: Progressing  Flowsheets (Taken 12/2/2024 1759)  Promote skin healing: Assess skin/pad under line(s)/device(s)     Problem: Pain - Adult  Goal: Verbalizes/displays adequate comfort level or baseline comfort level  Outcome: Progressing     Problem: Safety - Adult  Goal: Free from fall injury  Outcome: Progressing     Problem: Discharge Planning  Goal: Discharge to home or other facility with appropriate resources  Outcome: Progressing     Problem: Chronic Conditions and Co-morbidities  Goal: Patient's chronic conditions and co-morbidity symptoms are monitored and maintained or improved  Outcome: Progressing     Problem: Diabetes  Goal: Achieve decreasing blood glucose levels by end of shift  Outcome: Progressing  Goal: Increase stability of blood glucose readings by end of shift  Outcome: Progressing  Goal: Decrease in ketones present in urine by end of shift  Outcome: Progressing  Goal: Maintain electrolyte levels within acceptable range throughout shift  Outcome: Progressing  Goal: Maintain glucose levels >70mg/dl to <250mg/dl throughout shift  Outcome: Progressing  Goal: No changes in neurological exam by end of shift  Outcome: Progressing  Goal: Learn about and adhere to nutrition recommendations by end of shift  Outcome: Progressing  Goal: Vital signs within normal range for age by end of shift  Outcome: Progressing  Goal: Increase self care and/or family involovement  by end of shift  Outcome: Progressing  Goal: Receive DSME education by end of shift  Outcome: Progressing

## 2024-12-02 NOTE — PROGRESS NOTES
Occupational Therapy                 Therapy Communication Note    Patient Name: Dustin Ace  MRN: [id number]  Department: [facility] OR  Room: [facility]*  Today's Date: 12/2/2024     Discipline: Occupational Therapy    Missed Visit Reason: Missed Visit Reason: Patient in a medical procedure (Per EMR -- pt reportedly having sx for finalization of BKA. Will attempt to f/u with pt when medically appropriate.)    Missed Time: Attempt 15:39      12/02/24 at 3:40 PM - Rajni Jacobs OT

## 2024-12-02 NOTE — SIGNIFICANT EVENT
S:    Patient POD0 for formalization of R BKA and TMR. Patient tolerated the procedure well. EBL 150mL. Patient received 1 unit pRBC intraoperatively.     O:   Vital signs are stable, normotensive, afebrile, no new or worsening oxygen requirement, not tachycardic  Visit Vitals  /55 (BP Location: Left leg)   Pulse 62   Temp 36.6 °C (97.9 °F) (Temporal)   Resp 14      Plan:   -Please hold heparin and DVT prophylaxis for now, vascular surgery will reevaluate tomorrow.  -CBC, CMP in PACU. Plan to transfuse for HGB < 8.0 given cardiac history.  -Pain control with oxy/dilaudid.  -OK for regular diet  -Sal[place] in place until tomorrow  -Please keep knee immobilizer in place  -Continue zosyn at least 24hrs, other antibiotics per primary team  -Will follow up on the patient in the a.m. or sooner as needed.  -Rest of care per primary team  -Please do not hesitate to contact with any questions    Mio Forbes MD  PGY-1 General Surgery  Vascular Surgery [id number]

## 2024-12-02 NOTE — OP NOTE
Leg Amputation Below Knee (R) Operative Note     Date: 2024  OR Location: [facility] OR    Name: Dustin Ace, : 1950, Age: 74 y.o., MRN: [id number], Sex: male    Diagnosis  Pre-op Diagnosis      * Osteomyelitis of right foot, unspecified type (Multi) [M86.9] Post-op Diagnosis     * Osteomyelitis of right foot, unspecified type (Multi) [M86.9]     Procedures    Right ankle disarticulation      Surgeons      * Dominiknalini Gates - Primary    Resident/Fellow/Other Assistant:  Surgeons and Role:     * Piter Zuniga MD - Assisting    Staff:   Circulator: Alexandra Robertson Scrub: Raul  Circulator: Chin  Scrub Person: Sean  Scrub Person: Vandana Robertson Circulator: Sean    Anesthesia Staff: Anesthesiologist: Kamran Atkinson MD; Olga Chun MD  CRNA: Hakeem Romano, APRN-CRNA  Anesthesia Resident: Ami Orona MD    Procedure Summary  Anesthesia: General  ASA: III  Estimated Blood Loss: 5mL  Intra-op Medications:   Administrations occurring from 0846 to 1428 on 24:   Medication Name Total Dose   sodium chloride 0.9 % irrigation solution 1,000 mL   acetaminophen (Tylenol) tablet 975 mg Cannot be calculated   alum-mag hydroxide-simeth (Mylanta) 200-200-20 mg/5 mL oral suspension 30 mL Cannot be calculated   atorvastatin (Lipitor) tablet 80 mg Cannot be calculated   cholecalciferol (Vitamin D-3) tablet 10 mcg Cannot be calculated   gabapentin (Neurontin) capsule 300 mg Cannot be calculated   oxyCODONE (Roxicodone) immediate release tablet 5 mg Cannot be calculated   polyethylene glycol (Glycolax, Miralax) packet 17 g Cannot be calculated   tamsulosin (Flomax) 24 hr capsule 0.4 mg Cannot be calculated   ceFAZolin (Ancef) vial 1 g 2 g   fentaNYL (Sublimaze) injection 50 mcg/mL 100 mcg   ketamine injection 50 mg/ 5 mL (10 mg/mL) 50 mg   LR bolus Cannot be calculated   lidocaine (cardiac) injection 2% prefilled syringe 40 mg   midazolam PF (Versed) injection 1 mg/mL 2 mg   ondansetron (Zofran) 2  mg/mL injection 4 mg   oxygen (O2) therapy 30 L   piperacillin-tazobactam (Zosyn) 4.5 g in dextrose (iso)  mL Cannot be calculated   propofol (Diprivan) injection 10 mg/mL 80 mg   rocuronium (ZeMuron) 50 mg/5 mL injection 100 mg   sugammadex (Bridion) 200 mg/2 mL injection 400 mg   vancomycin (Vancocin) 1,000 mg in dextrose 5%  mL Cannot be calculated   vancomycin (Vancocin) pharmacy to dose - pharmacy monitoring Cannot be calculated              Anesthesia Record               Intraprocedure I/O Totals          Intake    PRBC 350.00 mL    LR bolus 850.00 mL    Remifentanil Drip 0.00 mL    The total shown is the total volume documented since Anesthesia Start was filed.    Phenylephrine Drip 0.00 mL    The total shown is the total volume documented since Anesthesia Start was filed.    albumin human 5 % 500.00 mL    piperacillin-tazobactam (Zosyn) 3.375 g in dextrose (iso) IV 50 mL 300.00 mL    Total Intake 2000 mL       Output    Urine 1500 mL    Est. Blood Loss 250 mL    Total Output 1750 mL       Net    Net Volume 250 mL          Specimen:   ID Type Source Tests Collected by Time   1 : RIGHT FOOT AMPUTATION Tissue FOOT AMPUTATION RIGHT SURGICAL PATHOLOGY EXAM Sis Gates MD 11/29/2024 1254         Drains and/or Catheters:   Urethral Catheter Non-latex 16 Fr. (Active)   Site Assessment Clean;Skin intact 12/02/24 1617   Collection Container Standard drainage bag 12/02/24 1617   Securement Method Securing device (Describe) 12/02/24 1617       Tourniquet Times: N/A      Implants: N/A    Findings: Purulence at the wound base. No infection tracking up the ankle.    Indications: Dustin Ace is an 74 y.o. male with hx of HTN, HLD, CVA, A fib on Eliquis, PAD admitted with non-healing right foot wound. He was evaluated by podiatry and it was determined that the foot is not salvageable. As a result, a right BKA was recommended. Today, patient has purulence at the wound base. As a result, an ankle  disarticulation was recommended for source control with plans to return to the OR in a few days for formalization. This was discussed with the patient in detail after which he elected to proceed with surgery.    The patient was seen in the preoperative area. The risks, benefits, complications, treatment options, non-operative alternatives, expected recovery and outcomes were discussed with the patient. The possibilities of reaction to medication, pulmonary aspiration, injury to surrounding structures, bleeding, recurrent infection, the need for additional procedures, failure to diagnose a condition, and creating a complication requiring transfusion or operation were discussed with the patient. The patient concurred with the proposed plan, giving informed consent.  The site of surgery was properly noted/marked if necessary per policy. The patient has been actively warmed in preoperative area. Preoperative antibiotics have been ordered and given within 1 hours of incision. Venous thrombosis prophylaxis are not indicated.    Procedure Details:     Patient was taken to the operating room and placed in supine position. General anesthesia was then induced. Patient was then prepped and draped in the usual sterile fashion. Timeout was again performed to verify the patient, procedure, site prior to beginning.     An incision was made circumferentially around the right ankle.  This was deepened down through the subcutaneous tissue, fascia, and tendons until the anterior tibial artery was encountered and suture-ligated using 2-0 silk sutures as was the posterior tibial artery.  Next, the joint space was entered and the ankle was disarticulated.  Remaining tendons and ligaments were divided and the foot was handed off as a routine specimen.  Hemostasis was then achieved.  The wound was then dressed with saline-soaked Kerlix followed by a dry Kerlix wrap and 4 inch Ace wrap.      The patient tolerated the procedure well and was  taken to the PACU in stable condition.  I was present for the entirety of the case.    Complications:  None; patient tolerated the procedure well.    Disposition: PACU - hemodynamically stable.  Condition: stable     Attending Attestation: I was present and scrubbed for the entire procedure.    Sis Gates  Phone Number: [phone]

## 2024-12-02 NOTE — ANESTHESIA PROCEDURE NOTES
Arterial Line:    Date/Time: 12/2/2024 11:45 AM    Staffing  Performed: resident   Authorized by: Ana Kurtz MD    Performed by: Kalin Aguilar MD    An arterial line was placed. Procedure performed using surface landmarks.in the OR for the following indication(s): continuous blood pressure monitoring and blood sampling needed.    A 20 gauge (size), 1 and 3/4 inch (length), Angiocath (type) catheter was placed into the Left radial artery, secured by Tegaderm,   Seldinger technique used.  Events:  patient tolerated procedure well with no complications.

## 2024-12-02 NOTE — ANESTHESIA PROCEDURE NOTES
Peripheral IV  Date/Time: 12/2/2024 11:50 AM      Placement  Needle size: 16 G  Laterality: right  Location: external jugular  Local anesthetic: none  Site prep: chlorhexidine  Technique: anatomical landmarks  Attempts: 1

## 2024-12-02 NOTE — OP NOTE
formalization of R BKA with TMR (R) Operative Note     Date: 2024  OR Location: [facility] OR    Name: Dustin Ace, : 1950, Age: 74 y.o., MRN: [id number], Sex: male    Diagnosis  Pre-op Diagnosis      * Osteomyelitis of right foot, unspecified type (Multi) [M86.9] Post-op Diagnosis     * Osteomyelitis of right foot, unspecified type (Multi) [M86.9]     Procedures    Right below knee amputation  Targeted muscle reinnervation of the right tibial nerve  Targeted muscle reinnervation of the right superficial peroneal nerve  Targeted muscle reinnervation of the right deep peroneal nerve    Surgeons      * Mrinalini Gates - Primary    Resident/Fellow/Other Assistant:  Surgeons and Role:  * No surgeons found with a matching role *    Staff:   Circulator: Maddi  Scrub Person: Lary  Relief Circulator: Shannon  Relief Scrub: Shannon  Relief Scrub: Marifer  Relief Circulator: Lary    Anesthesia Staff: Anesthesiologist: Ana Kurtz MD  CRNA: PATY Calero-CRNA  Anesthesia Resident: Kalin Aguilar MD    Procedure Summary  Anesthesia: General  ASA: III  Estimated Blood Loss: 150 mL  Intra-op Medications:   Administrations occurring from 1042 to 1616 on 24:   Medication Name Total Dose   vancomycin (Vancocin) vial for injection 2 g   sodium chloride 0.9 % irrigation solution 500 mL   vancomycin (Vancocin) 1,000 mg in sodium chloride 0.9 % 1,000 mL irrigation Cannot be calculated   BUPivacaine HCl (Marcaine) 0.25 % (2.5 mg/mL) injection 16 mL   acetaminophen (Tylenol) tablet 975 mg Cannot be calculated   albumin human 5 % 500 mL   alum-mag hydroxide-simeth (Mylanta) 200-200-20 mg/5 mL oral suspension 30 mL Cannot be calculated   atorvastatin (Lipitor) tablet 80 mg Cannot be calculated   benzocaine-menthol (Cepastat Sore Throat) lozenge 1 lozenge Cannot be calculated   ceFAZolin (Ancef) 1 g 4 g   cholecalciferol (Vitamin D-3) tablet 10 mcg Cannot be calculated   dexAMETHasone (Decadron) injection 4  mg/mL 8 mg   ePHEDrine injection 40 mg   fentaNYL (Sublimaze) injection 50 mcg/mL 100 mcg   gabapentin (Neurontin) capsule 300 mg Cannot be calculated   HYDROmorphone (Dilaudid) injection 0.4 mg Cannot be calculated   HYDROmorphone (Dilaudid) injection 1 mg/mL 0.4 mg   ketamine injection 50 mg/ 5 mL (10 mg/mL) 90 mg   LR bolus Cannot be calculated   lidocaine (cardiac) injection 2% prefilled syringe 100 mg   lidocaine PF (Xylocaine) 10 mg/mL (1 %) injection 50 mg Cannot be calculated   methocarbamol (Robaxin) tablet 500 mg Cannot be calculated   midazolam PF (Versed) injection 1 mg/mL 2 mg   ondansetron (Zofran) 2 mg/mL injection 4 mg   oxyCODONE (Roxicodone) immediate release tablet 5 mg Cannot be calculated   phenylephrine (Brian-Synephrine) 10 mg/250 mL NS (40 mcg/mL) infusion 8.31 mg   phenylephrine 40 mcg/mL syringe 10 mL 360 mcg   piperacillin-tazobactam (Zosyn) 3.375 g in dextrose (iso) IV 50 mL 3.375 g   polyethylene glycol (Glycolax, Miralax) packet 17 g Cannot be calculated   propofol (Diprivan) injection 10 mg/mL 150 mg   remifentanil (Ultiva) 1,000 mcg in lactated Ringer's 50 mL (20 mcg/mL) infusion 0.71 mg   rocuronium (ZeMuron) 50 mg/5 mL injection 60 mg   sodium chloride 0.9% flush 10 mL Cannot be calculated   sodium chloride 0.9% flush 10 mL Cannot be calculated   tamsulosin (Flomax) 24 hr capsule 0.4 mg Cannot be calculated   vancomycin (Vancocin) pharmacy to dose - pharmacy monitoring Cannot be calculated   vancomycin 1,250 mg in NS  mL Cannot be calculated   albumin human infusion 5 %  - Omnicell Override Pull Cannot be calculated   albumin human infusion 5 %  - Omnicell Override Pull Cannot be calculated   dexAMETHasone (Decadron) injection 4 mg/mL  - Omnicell Override Pull Cannot be calculated   ePHEDrine injection 50 mg/mL  - Omnicell Override Pull Cannot be calculated   fentaNYL PF (Sublimaze) injection 50 mcg/mL  - Omnicell Override Pull Cannot be calculated   ketamine in NaCl,  iso-osmotic injection 50 mg/5 mL (10 mg/mL)  - Omnicell Override Pull Cannot be calculated   ketamine in NaCl, iso-osmotic injection 50 mg/5 mL (10 mg/mL)  - Omnicell Override Pull Cannot be calculated   lidocaine (cardiac) (Xylocaine) injection 100 mg/5 mL (2 %)  - Omnicell Override Pull Cannot be calculated   magnesium sulfate 2 g in sterile water for injection 50 mL Cannot be calculated   midazolam (Versed) injection 1 mg/mL  - Omnicell Override Pull Cannot be calculated   ondansetron (Zofran) injection 4 mg/2 mL  - Omnicell Override Pull Cannot be calculated   phenylephrine HCl in 0.9% NaCl syringe 0.4 mg/10 mL (40 mcg/mL)  - Omnicell Override Pull Cannot be calculated   propofol (Diprivan) injection 10 mg/mL  - Omnicell Override Pull Cannot be calculated   rocuronium (ZeMuron) injection 10 mg/mL  - Omnicell Override Pull Cannot be calculated              Anesthesia Record               Intraprocedure I/O Totals          Intake    PRBC 350.00 mL    LR bolus 850.00 mL    Remifentanil Drip 0.00 mL    The total shown is the total volume documented since Anesthesia Start was filed.    Phenylephrine Drip 0.00 mL    The total shown is the total volume documented since Anesthesia Start was filed.    albumin human 5 % 500.00 mL    piperacillin-tazobactam (Zosyn) 3.375 g in dextrose (iso) IV 50 mL 300.00 mL    Total Intake 2000 mL       Output    Urine 1500 mL    Est. Blood Loss 250 mL    Total Output 1750 mL       Net    Net Volume 250 mL          Specimen:   ID Type Source Tests Collected by Time   1 : RIGHT LEG Tissue LEG AMPUTATION BELOW THE KNEE RIGHT SURGICAL PATHOLOGY EXAM Sis Gates MD 12/2/2024 1403                 Drains and/or Catheters:   Urethral Catheter Non-latex 16 Fr. (Active)       Tourniquet Times: N/A        Implants: N/A    Findings: Healthy muscle with brisk bleeding. No signs of residual infection. During the TMR, there was contraction of the posterior and deep posterior compartment  muscles;however, this contraction was diminished overall even though patient had 4 twitches. After TMR, the patient continued to have weak contraction of the muscles upon stimulation of the newly-coapted nerves.    Indications: Dustin Ace is an 74 y.o. 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 He presents today for a BKA formalization with TMR. The risks and benefits, including but not limited to bleeding, infection, injury to surrounding structures, need for additional procedures, were discussed with the patient who elected to proceed with surgery.    The patient was seen in the preoperative area. The risks, benefits, complications, treatment options, non-operative alternatives, expected recovery and outcomes were discussed with the patient. The possibilities of reaction to medication, pulmonary aspiration, injury to surrounding structures, bleeding, recurrent infection, the need for additional procedures, failure to diagnose a condition, and creating a complication requiring transfusion or operation were discussed with the patient. The patient concurred with the proposed plan, giving informed consent.  The site of surgery was properly noted/marked if necessary per policy. The patient has been actively warmed in preoperative area. Preoperative antibiotics have been ordered and given within 1 hours of incision. Venous thrombosis prophylaxis are not indicated.    Procedure Details:     The patient was taken to the operating room and placed in supine position. General anesthesia was then induced. The patient was then prepped and draped in the usual sterile fashion after which a timeout was performed to verify the patient, procedure, site prior to beginning.      We first began by marking out our right BKA flap in the usual fashion. Next, a longitudinal incision was made 1-2 fingerbreadth posterior to the  edge of the tibia on the lateral side below the level of our BKA anterior incision. This incision was deepened down and the fascia was incised sharply. The extensor digitorum muscle was reflected posteriorly exposing the underlying anterior tibial bundle. The deep peroneal nerve was identified and dissected free from the surrounding structures for an extended length. Next, a longitudinal incision was made over the lateral compartment fascia and the underlying superficial peroneal nerve was identified and dissected free for an extended length and transected sharply just above the level of the ankle. With the peroneal nerves dissected, we directed our attention to the saphenous nerve. A longitudinal incision was made 1-2 fingerbreadth posterior to the tibia on the medial side. The underlying saphenous vein was identified and blunt dissection was performed to identify the saphenous nerve. The saphenous nerve was dissected free and noted to be very small and diminutive (1-2 mm) for the a significant length. As a result, I did not feel that TMR of the this diminutive saphenous nerve was going to be beneficial.      With the peroneal nerves dissected, we began to perform the BKA formalization. Using a scalpel, an incision was made along the previously marked BKA flap. While protecting the dissected nerves, the muscles within the anterior compartment and superficial posterior compartment were transected at the level of the skin incision. A tunnel was then created posterior to the tibia and the periosteum along the anterior aspect of the tibia was cleared. The tibia was then divided using an oscillating saw about 1-2 cm above the level of the skin after which the anterior surface of the tibia was beveled. Next, the fibula was dissected from the surrounding structures and divided above the level of the tibia using a bone cutter. Next, an amputation knife was used to complete the posterior flap and distal leg was handed off  to pathology as a routine specimen. At this time, the excess muscle within the posterior flap was trimmed and hemostasis was achieved. The tibial nerve was identified and mobilized to allow tension free coaptation.     At this time, we proceeded with performing the targeted muscle reinnervation of the three nerves.      Using a check point stimulator, a motor end nerve was identified within the soleus muscle. This was sharply divided as was the superficial peroneal nerve. Next, a tension free co-aptation was performed of the superficial peroneal nerve with the distal motor end nerve within the soleus muscle using 8-0 Prolene suture. The repair was then covered by imbricating the surrounding muscle using 3-0 Vicryl suture. The newly coapted superficial peroneal nerve was then stimulated with nerve stimulator with contraction of the muscle.         Using a check point stimulator, a motor end nerve was identified within the soleus muscle. This was sharply divided as was the deep peroneal nerve. Next, a tension free co-aptation was performed of the deep peroneal nerve with the distal motor end nerve using 8-0 Prolene suture. The repair was then covered by imbricating the surrounding muscle using 3-0 Vicryl suture. The newly coapted deep peroneal nerve was then stimulated with nerve stimulator with contraction of the muscle.         Using a check point stimulator, a motor end nerve was identified within the soleus muscle. This was sharply divided as was the tibial nerve. Next, a tension free co-aptation was performed of the tibial nerve with the distal motor end nerve using 8-0 Prolene suture.  The newly coapted tibial nerve was then stimulated with nerve stimulator with contraction of the muscle.      At this time, the wound was copiously irrigated and hemostasis was achieved. The tibial nerve was infiltrated with 15 ml of 0.25% Marcaine and the incision was closed in 2 layers. The fascia was re-approximated using  interrupted 2-0 Vicryl sutures and the skin was closed using staples and interrupted nylon suture. A sterile dressing was then applied. The patient tolerated the procedure well and was extubated without difficulty. The patient was taken PACU in stable condition. I was present for the entirety of the case.     Complications:  None; patient tolerated the procedure well.    Disposition: PACU - hemodynamically stable.  Condition: stable     Attending Attestation: I was present and scrubbed for the entire procedure.    Sis Gates  Phone Number: [phone]

## 2024-12-02 NOTE — ANESTHESIA PROCEDURE NOTES
Airway  Date/Time: 12/2/2024 11:40 AM  Urgency: elective    Airway not difficult    Staffing  Performed: resident   Authorized by: Ana Kurtz MD    Performed by: Kalin Aguilar MD  Patient location during procedure: OR    Indications and Patient Condition  Indications for airway management: anesthesia  Spontaneous ventilation: present  Sedation level: deep  Preoxygenated: yes  Patient position: sniffing  Mask difficulty assessment: 2 - vent by mask + OA or adjuvant +/- NMBA  Planned trial extubation    Final Airway Details  Final airway type: endotracheal airway      Successful airway: ETT  Cuffed: yes   Successful intubation technique: video laryngoscopy  Facilitating devices/methods: intubating stylet and cricoid pressure  Endotracheal tube insertion site: oral  Blade size: #4  ETT size (mm): 7.5  Cormack-Lehane Classification: grade IIa - partial view of glottis  Placement verified by: chest auscultation and capnometry   Measured from: teeth  ETT to teeth (cm): 22  Number of attempts at approach: 1  Number of other approaches attempted: 0

## 2024-12-02 NOTE — ANESTHESIA PREPROCEDURE EVALUATION
Patient: Dustin Ace    Procedure Information       Anesthesia Start Date/Time: 12/02/24 1117    Procedure: Leg Amputation Below Knee (Right) - Formalization of R BKA with TMR    Location: [facility] OR 14 / [facility] OR    Surgeons: Sis Gates MD            Relevant Problems   Anesthesia (within normal limits)      Cardiac   (+) CAD (coronary artery disease)   (+) HTN (hypertension)   (+) Hyperlipidemia   (+) Paroxysmal atrial fibrillation (Multi)      Pulmonary   (+) JOSE (obstructive sleep apnea)   (+) Pulmonary emphysema (Multi)      Neuro   (+) CVA (cerebral vascular accident) (Multi)      GI (within normal limits)      /Renal (within normal limits)      Liver   (+) Hepatitis C (Treated)      Endocrine   (+) Diabetes mellitus, type 2 (Multi)      Hematology (within normal limits)      Musculoskeletal (within normal limits)      HEENT (within normal limits)      ID   (+) Hepatitis C (Treated)   (+) Osteomyelitis of right foot, unspecified type (Multi)      Skin (within normal limits)      GYN (within normal limits)       Clinical information reviewed:   Tobacco  Allergies  Meds   Med Hx  Surg Hx   Fam Hx  Soc Hx        NPO Detail:  NPO/Void Status  Carbohydrate Drink Given Prior to Surgery? : N  Date of Last Liquid: 12/01/24  Time of Last Liquid: 2359  Date of Last Solid: 12/01/24  Time of Last Solid: 2359  Last Intake Type: Clear fluids  Time of Last Void: 0800         Physical Exam    Airway  Mallampati: II  TM distance: >3 FB  Neck ROM: full     Cardiovascular   Rhythm: regular  Rate: normal     Dental   (+) upper dentures       Pulmonary   Breath sounds clear to auscultation     Abdominal            Anesthesia Plan    History of general anesthesia?: yes  History of complications of general anesthesia?: no    ASA 3     general     intravenous induction   Postoperative administration of opioids is intended.  Trial extubation is planned.  Anesthetic plan and risks discussed with  patient.  Use of blood products discussed with patient who consented to blood products.    Plan discussed with attending.

## 2024-12-02 NOTE — CARE PLAN
The patient's goals for the shift include      The clinical goals for the shift include patient nely be free from fall/injury during the shift.      Problem: Fall/Injury  Goal: Not fall by end of shift  Outcome: Progressing  Goal: Be free from injury by end of the shift  Outcome: Progressing

## 2024-12-03 LAB
ALBUMIN SERPL BCP-MCNC: 3.3 G/DL (ref 3.4–5)
ANION GAP SERPL CALC-SCNC: 14 MMOL/L (ref 10–20)
BASOPHILS # BLD AUTO: 0.01 X10*3/UL (ref 0–0.1)
BASOPHILS NFR BLD AUTO: 0.1 %
BUN SERPL-MCNC: 9 MG/DL (ref 6–23)
CALCIUM SERPL-MCNC: 8.8 MG/DL (ref 8.6–10.6)
CHLORIDE SERPL-SCNC: 106 MMOL/L (ref 98–107)
CO2 SERPL-SCNC: 22 MMOL/L (ref 21–32)
CREAT SERPL-MCNC: 0.68 MG/DL (ref 0.5–1.3)
EGFRCR SERPLBLD CKD-EPI 2021: >90 ML/MIN/1.73M*2
EOSINOPHIL # BLD AUTO: 0 X10*3/UL (ref 0–0.4)
EOSINOPHIL NFR BLD AUTO: 0 %
ERYTHROCYTE [DISTWIDTH] IN BLOOD BY AUTOMATED COUNT: 17 % (ref 11.5–14.5)
GLUCOSE BLD MANUAL STRIP-MCNC: 129 MG/DL (ref 74–99)
GLUCOSE BLD MANUAL STRIP-MCNC: 133 MG/DL (ref 74–99)
GLUCOSE BLD MANUAL STRIP-MCNC: 189 MG/DL (ref 74–99)
GLUCOSE BLD MANUAL STRIP-MCNC: 224 MG/DL (ref 74–99)
GLUCOSE SERPL-MCNC: 198 MG/DL (ref 74–99)
HCT VFR BLD AUTO: 27.4 % (ref 41–52)
HGB BLD-MCNC: 8.7 G/DL (ref 13.5–17.5)
IMM GRANULOCYTES # BLD AUTO: 0.05 X10*3/UL (ref 0–0.5)
IMM GRANULOCYTES NFR BLD AUTO: 0.4 % (ref 0–0.9)
LYMPHOCYTES # BLD AUTO: 1.44 X10*3/UL (ref 0.8–3)
LYMPHOCYTES NFR BLD AUTO: 12.3 %
MAGNESIUM SERPL-MCNC: 2.21 MG/DL (ref 1.6–2.4)
MCH RBC QN AUTO: 28.3 PG (ref 26–34)
MCHC RBC AUTO-ENTMCNC: 31.8 G/DL (ref 32–36)
MCV RBC AUTO: 89 FL (ref 80–100)
MONOCYTES # BLD AUTO: 0.9 X10*3/UL (ref 0.05–0.8)
MONOCYTES NFR BLD AUTO: 7.7 %
NEUTROPHILS # BLD AUTO: 9.33 X10*3/UL (ref 1.6–5.5)
NEUTROPHILS NFR BLD AUTO: 79.5 %
NRBC BLD-RTO: 0 /100 WBCS (ref 0–0)
PHOSPHATE SERPL-MCNC: 2 MG/DL (ref 2.5–4.9)
PLATELET # BLD AUTO: 303 X10*3/UL (ref 150–450)
POTASSIUM SERPL-SCNC: 3.9 MMOL/L (ref 3.5–5.3)
RBC # BLD AUTO: 3.07 X10*6/UL (ref 4.5–5.9)
SODIUM SERPL-SCNC: 138 MMOL/L (ref 136–145)
WBC # BLD AUTO: 11.7 X10*3/UL (ref 4.4–11.3)

## 2024-12-03 PROCEDURE — 2500000004 HC RX 250 GENERAL PHARMACY W/ HCPCS (ALT 636 FOR OP/ED)

## 2024-12-03 PROCEDURE — 99232 SBSQ HOSP IP/OBS MODERATE 35: CPT

## 2024-12-03 PROCEDURE — 2500000001 HC RX 250 WO HCPCS SELF ADMINISTERED DRUGS (ALT 637 FOR MEDICARE OP)

## 2024-12-03 PROCEDURE — 83735 ASSAY OF MAGNESIUM: CPT

## 2024-12-03 PROCEDURE — 85025 COMPLETE CBC W/AUTO DIFF WBC: CPT

## 2024-12-03 PROCEDURE — 80069 RENAL FUNCTION PANEL: CPT

## 2024-12-03 PROCEDURE — 97164 PT RE-EVAL EST PLAN CARE: CPT | Mod: GP

## 2024-12-03 PROCEDURE — 97165 OT EVAL LOW COMPLEX 30 MIN: CPT | Mod: GO

## 2024-12-03 PROCEDURE — 1200000002 HC GENERAL ROOM WITH TELEMETRY DAILY

## 2024-12-03 PROCEDURE — 2500000002 HC RX 250 W HCPCS SELF ADMINISTERED DRUGS (ALT 637 FOR MEDICARE OP, ALT 636 FOR OP/ED)

## 2024-12-03 PROCEDURE — 97110 THERAPEUTIC EXERCISES: CPT | Mod: GP

## 2024-12-03 PROCEDURE — 82947 ASSAY GLUCOSE BLOOD QUANT: CPT

## 2024-12-03 PROCEDURE — 2500000005 HC RX 250 GENERAL PHARMACY W/O HCPCS

## 2024-12-03 RX ORDER — DEXTROSE 50 % IN WATER (D50W) INTRAVENOUS SYRINGE
25
Status: DISCONTINUED | OUTPATIENT
Start: 2024-12-03 | End: 2024-12-07 | Stop reason: HOSPADM

## 2024-12-03 RX ORDER — INSULIN LISPRO 100 [IU]/ML
0-5 INJECTION, SOLUTION INTRAVENOUS; SUBCUTANEOUS
Status: DISCONTINUED | OUTPATIENT
Start: 2024-12-04 | End: 2024-12-07 | Stop reason: HOSPADM

## 2024-12-03 RX ORDER — DEXTROSE 50 % IN WATER (D50W) INTRAVENOUS SYRINGE
12.5
Status: DISCONTINUED | OUTPATIENT
Start: 2024-12-03 | End: 2024-12-07 | Stop reason: HOSPADM

## 2024-12-03 ASSESSMENT — COGNITIVE AND FUNCTIONAL STATUS - GENERAL
TURNING FROM BACK TO SIDE WHILE IN FLAT BAD: A LITTLE
TOILETING: A LOT
DRESSING REGULAR UPPER BODY CLOTHING: A LITTLE
STANDING UP FROM CHAIR USING ARMS: A LITTLE
WALKING IN HOSPITAL ROOM: A LITTLE
CLIMB 3 TO 5 STEPS WITH RAILING: TOTAL
MOVING TO AND FROM BED TO CHAIR: A LOT
DRESSING REGULAR LOWER BODY CLOTHING: A LOT
WALKING IN HOSPITAL ROOM: A LOT
HELP NEEDED FOR BATHING: A LITTLE
MOVING FROM LYING ON BACK TO SITTING ON SIDE OF FLAT BED WITH BEDRAILS: A LITTLE
MOBILITY SCORE: 18
STANDING UP FROM CHAIR USING ARMS: A LOT
PERSONAL GROOMING: A LITTLE
MOBILITY SCORE: 13
MOVING TO AND FROM BED TO CHAIR: A LITTLE
CLIMB 3 TO 5 STEPS WITH RAILING: TOTAL
DAILY ACTIVITIY SCORE: 17

## 2024-12-03 ASSESSMENT — PAIN SCALES - GENERAL
PAINLEVEL_OUTOF10: 7
PAINLEVEL_OUTOF10: 10 - WORST POSSIBLE PAIN
PAINLEVEL_OUTOF10: 10 - WORST POSSIBLE PAIN
PAINLEVEL_OUTOF10: 7
PAINLEVEL_OUTOF10: 6
PAINLEVEL_OUTOF10: 10 - WORST POSSIBLE PAIN
PAINLEVEL_OUTOF10: 10 - WORST POSSIBLE PAIN
PAINLEVEL_OUTOF10: 7
PAINLEVEL_OUTOF10: 4

## 2024-12-03 ASSESSMENT — PAIN - FUNCTIONAL ASSESSMENT
PAIN_FUNCTIONAL_ASSESSMENT: 0-10

## 2024-12-03 ASSESSMENT — PAIN DESCRIPTION - LOCATION
LOCATION: LEG

## 2024-12-03 ASSESSMENT — PAIN DESCRIPTION - ORIENTATION
ORIENTATION: RIGHT

## 2024-12-03 NOTE — PROGRESS NOTES
Occupational Therapy    Evaluation    Patient Name: Dustin Ace  MRN: [id number]  Today's Date: 12/3/2024  Room: [address] [place], [place] [address]  Time Calculation  Start Time: 1148  Stop Time: 1212  Time Calculation (min): 24 min    Assessment  IP OT Assessment  End of Session Communication: Bedside nurse  End of Session Patient Position: Bed, 3 rail up, Alarm off, not on at start of session  Plan:  Inpatient Plan  Treatment Interventions: ADL retraining, Functional transfer training, UE strengthening/ROM, Endurance training, Patient/family training, Equipment evaluation/education, Compensatory technique education, Continued evaluation  OT Frequency: 3 times per week  OT Discharge Recommendations: Moderate intensity level of continued care  Equipment Recommended upon Discharge:  (Tub Bench, raised toilet seat for comfort)  OT - OK to Discharge:  (OT Re-Eval completed.)  OT Assessment  OT Assessment Results: Decreased ADL status, Decreased endurance, Decreased functional mobility, Decreased IADLs, Decreased trunk control for functional activities  Strengths: Attitude of self  Barriers to Participation: Comorbidities    Subjective   Current Problem:  1. Osteomyelitis of right foot, unspecified type (Multi)  Vascular US lower extremity arterial duplex bilateral with ARMANDO    Vascular US lower extremity arterial duplex bilateral with ARMANDO    Case Request Operating Room: Leg Amputation Below Knee    Case Request Operating Room: Leg Amputation Below Knee    Case Request Operating Room: Leg Amputation Below Knee    Case Request Operating Room: Leg Amputation Below Knee    Surgical Pathology Exam    Surgical Pathology Exam    Surgical Pathology Exam    Surgical Pathology Exam    CANCELED: Vascular US lower extremity arterial duplex bilateral with ARMANDO    CANCELED: Vascular US lower extremity arterial duplex bilateral with ARMANDO      2. Cellulitis of right lower extremity        3. Peripheral vascular disease, unspecified (CMS-HCC)  Vascular US lower  extremity arterial duplex bilateral with ARMANDO    Vascular US lower extremity arterial duplex bilateral with ARMANDO    Transthoracic Echo (TTE) Complete    Transthoracic Echo (TTE) Complete    CANCELED: Vascular US lower extremity arterial duplex bilateral with ARMANDO    CANCELED: Vascular US lower extremity arterial duplex bilateral with ARMANDO      4. History of CVA (cerebrovascular accident)  Transthoracic Echo (TTE) Complete    Transthoracic Echo (TTE) Complete      5. 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 emphysema, CVA hx, PAD; Afib,  HTN, DM, (L) eye prosthetic  Co-Treatment: PT  Co-Treatment Reason: for patient safety with mobility attempts  Prior to Session Communication: Bedside nurse  Family/Caregiver Present: No  General Comment: Pt received in supine, pleasant and copperative, agreeable to therapy. Pt appears to be progressing well, only limited by increased mobility this date 2' c/o dizziness when standing.   Precautions:  Hearing/Visual Limitations: (L) eye prosthetic  LE Weight Bearing Status: Right Non-Weight Bearing  Braces Applied: KI in place RLE  Vital Signs:  Vital Signs (Past 2hrs)        Date/Time Vitals Session Patient Position Pulse Resp SpO2 BP MAP (mmHg)    12/03/24 12:22:43 --  --  74  18  100 %  129/73  92                   Pain:  Pain Assessment  Pain Assessment: 0-10  0-10 (Numeric) Pain Score: 7  Pain Location: Leg  Pain Orientation: Right  Pain Interventions:  (Received pain meds 1-2 hours prior to therapy per RN.)  Lines/Tubes/Drains:  Midline 12/01/24 Single lumen Left Basilic vein (Active)   Number of days: 2         Objective   Cognition:  Arousal/Alertness: Appropriate responses to stimuli  Orientation Level: Oriented X4  Following Commands: Follows one step commands consistently            Home Living:  Type of Home: Apartment  Lives With: Alone  Home Adaptive Equipment: Walker rolling or standard, Cane  Home Access: Elevator  Home Living Comments: per previous PT eval, pt may have been admitted from [facility]   Prior Function:  Receives Help From: Home health  Ambulatory Assistance:  (Previously ambulated with a cane or a walker. SPC noted in pt's hospital room.)       ADL:  UE Dressing Assistance:  (Anticipate SBA after setup seated EOB based on pt's functional performance during other tasks in session.)  LE Dressing Assistance:  (CGA to don left sock in long sitting with HOB elevated. Anticipate min to mod assist for clothing management when in standing with support of RW, will continue to assess.)  LE Dressing Deficit: Don/doff L sock  Activity Tolerance:  Endurance: Tolerates 10 - 20 min exercise with multiple rests  Balance:  Dynamic Sitting Balance  Dynamic Sitting-Level of Assistance:  (Close SBA/CGA depending upon complexity of task.)  Static Sitting Balance  Static Sitting-Level of Assistance: Distant supervision  Static Standing Balance  Static Standing-Level of Assistance:  (CGA/MIn assist x1 with FWW)  Bed Mobility/Transfers: Bed Mobility  Bed Mobility: Yes  Bed Mobility 1  Bed Mobility 1: Supine to sitting  Level of Assistance 1: Close supervision, Minimal verbal cues  Bed Mobility Comments 1: HOB minimally elevated  Bed Mobility 2  Bed Mobility  2: Sitting to supine  Level of Assistance 2: Close supervision  Bed Mobility Comments 2: Pt utilized B/L hands to assist with management of RLE back into bed.  Functional Mobility  Functional Mobility Performed: Yes  Functional Mobility 1  Surface 1: Level tile  Device 1: Rolling walker  Assistance 1: Minimum assistance (x1)  Comments 1: Pt able to take several hopping side steps toward HOB.   and Transfers  Transfer: Yes  Transfer 1  Transfer From 1: Bed to  Transfer to 1: Stand  Transfer Device 1: Walker (EOB elevated.)  Transfer Level of  Assistance 1: Minimum assistance, +2, Moderate verbal cues  Trials/Comments 1: Cues for safe hand placement in task.  Transfers 2  Transfer From 2: Stand to  Transfer to 2: Bed  Transfer Level of Assistance 2: Minimum assistance, +2, Moderate verbal cues       Vision: Vision - Basic Assessment  Current Vision: Other (Comment) (Prosthetic Left eye, right eye WFL grossly.)   and    Sensation:  Light Touch: No apparent deficits (UE's)  Strength:  Strength Comments: Grossly WFL throughout B/L UE's.  Perception:  Inattention/Neglect: Appears intact  Coordination:  Movements are Fluid and Coordinated: Yes   Hand Function:  Hand Function  Gross Grasp: Functional  Extremities: RUE   RUE : Within Functional Limits, LUE   LUE: Within Functional Limits,  , and      Outcome Measures: [facility] Daily Activity  Putting on and taking off regular lower body clothing: A lot  Bathing (including washing, rinsing, drying): A little  Putting on and taking off regular upper body clothing: A little  Toileting, which includes using toilet, bedpan or urinal: A lot  Taking care of personal grooming such as brushing teeth: A little  Eating Meals: None  Daily Activity - Total Score: 17         ,     OT Adult Other Outcome Measures  4AT: 0    Education Documentation  Body Mechanics, taught by Rajni Jacobs OT at 12/3/2024  1:33 PM.  Learner: Patient  Readiness: Acceptance  Method: Explanation  Response: Needs Reinforcement    Precautions, taught by Rajni Jacobs OT at 12/3/2024  1:33 PM.  Learner: Patient  Readiness: Acceptance  Method: Explanation  Response: Needs Reinforcement    ADL Training, taught by Rajni Jacobs OT at 12/3/2024  1:33 PM.  Learner: Patient  Readiness: Acceptance  Method: Explanation  Response: Needs Reinforcement    Education Comments  No comments found.        Goals:     Encounter Problems       Encounter Problems (Active)       ADLs       Patient will perform UB and LB bathing  with modified independent level of assistance  and extended tub bench and long-handled sponge. (Progressing)       Start:  12/03/24    Expected End:  12/17/24            Patient with complete upper body dressing with independent level of assistance donning and doffing all UE clothes with no adaptive equipment while supported sitting and edge of bed  (Progressing)       Start:  12/03/24    Expected End:  12/17/24            Patient with complete lower body dressing with modified independent level of assistance donning and doffing all LE clothes  with PRN adaptive equipment while standing (Progressing)       Start:  12/03/24    Expected End:  12/17/24            Patient will complete daily grooming tasks brushing teeth and washing face/hair with modified independent level of assistance and PRN adaptive equipment while edge of bed  vs. standing at sink pending progress. (Progressing)       Start:  12/03/24    Expected End:  12/17/24            Patient will complete toileting including hygiene clothing management/hygiene with modified independent level of assistance and raised toilet seat. (Progressing)       Start:  12/03/24    Expected End:  12/17/24               BALANCE       Pt will maintain dynamic standing balance during ADL task with modified independent level of assistance in order to demonstrate decreased risk of falling and improved postural control. (Progressing)       Start:  12/03/24    Expected End:  12/17/24               MOBILITY       Patient will perform Functional mobility  Household distances/Community Distances with modified independent level of assistance and least restrictive device in order to improve safety and functional mobility. (Progressing)       Start:  12/03/24    Expected End:  12/17/24               TRANSFERS       Patient will perform bed mobility at independent level using safe technique in order to improve safety and independence with mobility (Progressing)       Start:  12/03/24    Expected End:  12/17/24            Patient  will complete functional transfer to all surfaces with least restrictive device with modified independent level of assistance. (Progressing)       Start:  12/03/24    Expected End:  12/17/24 12/03/24 at 1:33 PM   Rajni Jacobs OT   Rehab Office: [phone]

## 2024-12-03 NOTE — PROGRESS NOTES
Subjective   NAEO. Pain controlled. No chest pain, shortness of breath, abdominal pain.       Objective     Last Recorded Vitals  /73   Pulse 71   Temp 35.9 °C (96.6 °F)   Resp 16   Wt 90.7 kg (199 lb 15.3 oz)   SpO2 96%   Intake/Output last 3 Shifts:    Intake/Output Summary (Last 24 hours) at 12/3/2024 0749  Last data filed at 12/3/2024 0500  Gross per 24 hour   Intake 3460.34 ml   Output 3470 ml   Net -9.66 ml       Admission Weight  Weight: 90.7 kg (200 lb) (11/25/24 1123)    Daily Weight  11/29/24 : 90.7 kg (199 lb 15.3 oz)    Image Results  Bedside Midline Imaging  These images are not reportable by radiology and will not be interpreted   by  Radiologists.      Physical Exam  Constitutional:       General: He is not in acute distress.     Appearance: He is not ill-appearing.   HENT:      Head: Normocephalic and atraumatic.      Mouth/Throat:      Mouth: Mucous membranes are moist.   Eyes:      Extraocular Movements: Extraocular movements intact.      Pupils: Pupils are equal, round, and reactive to light.      Comments: Prosthetic left eye   Cardiovascular:      Rate and Rhythm: Normal rate and regular rhythm.      Heart sounds: Normal heart sounds.   Pulmonary:      Effort: Pulmonary effort is normal.      Breath sounds: Normal breath sounds.   Abdominal:      General: Abdomen is flat.      Palpations: Abdomen is soft.   Genitourinary:     Comments: [place] in place  Musculoskeletal:      Left lower leg: No edema.   Feet:      Comments: Left hallux amputation with dry skin. Appears intact. Noted callus under the first MTP.     Right side: Wound from BKA dressed  Neurological:      Mental Status: He is alert and oriented to person, place, and time.         Relevant Results               Assessment/Plan      Assessment & Plan  Osteomyelitis of right foot, unspecified type (Multi)    HTN (hypertension)    Paroxysmal atrial fibrillation (Multi)    JOSE (obstructive sleep apnea)    CVA (cerebral vascular  accident) (Multi)    Pulmonary emphysema (Multi)    Diabetes mellitus, type 2 (Multi)    Hyperlipidemia    CAD (coronary artery disease)    Hepatitis C    Dustin Ace is a 74 y.o. male with a history of BPH, centrilobular emphysema, chronic hepatitis C (treated), CVA, diabetes mellitus, hypertension, PAD, history of Charcot deformity, chronic right foot osteomyelitis, paroxysmal atrial fibrillation presenting for a chronic R foot wound. Patient met SIRS criteria on admission. Started on vanc and zosyn and got 500mL bolus, vitals now improved. MRI confirmed R foot OM. To undergo R BKA with vascular surgery.    Updates 12/3:  -Underwent formalization of R BKA yesterday  -Will stop antibiotics today at 24 hours post-op  -Holding heparin drip until tomorrow  -Remove mike    #Chronic infected right leg wound s/p multiple I&D's  #SIRS secondary to infected wound (resolved)  #Osteomyelitis  #Peripheral vascular disease  #Charcot deformity and neuropathy  - CRP: 16.25, ESR: 96  - SIRS criteria with source of infection, though no signs of end-organ damage currently, vitals improved with vanc, zosyn, and IVF bolus  - MRI showed OM in R foot  - Patient seen by podiatry, deemed R foot unsalvageable and recommended BKA  - ARMANDO showed mild disease in RLE, none in LLE    Cultures:  BCx (11/25): NGTD  Wound cultures (11/25): Pseudomonas and Strep gordonii     Antibiotics:  Vancomycin (11/25 - 12/3)  Zosyn (11/25 - 12/3)     Plan:  -R BKA with vascular surgery 11/29  -Formalization of R BKA on 12/2  -Stopped vancomycin and Zosyn   -Holding heparin drip today per vascular surgery  -Patient will go to SNF once medically ready       #Diabetes mellitus  - A1C 5.6 on 11/25  Plan:  - POC glucose checks, will add sliding scale insulin if needed     #HTN  - Hold amlodipine given normotension  Reintroduce as appropriate     #Paroxysmal Afib  - Hold apixaban for possible procedure/surgical intervention.   -Low intensity therapeutic heparin  drip on hold pending vascular surgery recs    #HLD  -continue home atorvastatin     #Anemia of chronic disease  - Baseline of approximately 12-13  - Presented at 9.0. Normocytic with elevated RDW  - Iron 25 (low), TIBC (low), UIBC 165, 13% sat, ferritin of 1,087, suggestive of anemia of chronic disease  - Retic absolute: 0.086, retic %: 2.7%. RPI <2, indicates inadequate response  Plan:   - Treat underlying infection     #Insomnia  #Anxiety  - Patient reports taking it approximately once per month  - Hold diazepam. Will give anxiolytic if needed     #BPH  - Continue tamsulosin     #MISC  - Continue B-complex with vitamin C  - Continue vitamin D  - Continue Maalox     F: PRN  E: PRN  N: regular diet  A: PIV  Bowel regimen: Miralax     DVT ppx: Holding home apixaban, heparin drip stopped for surgery  GI ppx: None    Code Status: Full code (confirmed on admission)  Surrogate Medical Decision-maker: Coco Finley (daughter): [phone]; Yanet Oropeza (son): [phone]

## 2024-12-03 NOTE — PROGRESS NOTES
Physical Therapy    Physical Therapy Evaluation & Treatment    Patient Name: Dustin Ace  MRN: [id number]  Department: [place] [address]  Room: [facility]60-  Today's Date: 12/3/2024   Time Calculation  Start Time: 1148  Stop Time: 1212  Time Calculation (min): 24 min    Assessment/Plan   PT Assessment  PT Assessment Results: Decreased strength, Impaired balance, Decreased mobility, Orthopedic restrictions, Pain  Rehab Prognosis: Good  End of Session Communication: Bedside nurse  Assessment Comment: Pt able to complete standing and side step tasks with assist, demos decreased strength and balance, need for further skilled PT to progress towards (I) functional mobility.  End of Session Patient Position: Bed, 3 rail up, Alarm off, not on at start of session   IP OR SWING BED PT PLAN  Inpatient or Swing Bed: Inpatient  PT Plan  Treatment/Interventions: Bed mobility, Transfer training, Gait training, Balance training, Therapeutic exercise, Therapeutic activity  PT Plan: Ongoing PT  PT Frequency: 4 times per week  PT Discharge Recommendations: Moderate intensity level of continued care  Equipment Recommended upon Discharge:  (none)  PT Recommended Transfer Status: Assist x1, Assistive device  PT - OK to Discharge: Yes (meaning pt seen and dc rec made)      Subjective     General Visit Information:  General  Reason for Referral: RE-eval s/p (R) BKA formalization, TMR 12/2  Past Medical History Relevant to Rehab: 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 emphysema, CVA hx, PAD; Afib,  HTN, DM, (L) eye prosthetic  Co-Treatment: OT  Co-Treatment Reason: for patient safety with mobility attempts  Prior to Session Communication: Bedside nurse  General Comment: Pt agreeable to participate, reports higher pain in (R) LE but willing to mobilize.  Able to stand and side step with assist and standard walker, limited distance attempted due to reports of dizziness.  Will continue to follow.  Home Living:  Home Living  Type of Home: Apartment  Lives  With: Alone  Home Adaptive Equipment: Walker rolling or standard, Cane  Home Access: Elevator  Home Living Comments: per previous PT eval, pt may have been admitted from [facility]  Prior Level of Function:  Prior Function Per Pt/Caregiver Report  Receives Help From: Home health  Ambulatory Assistance:  (previously amb with cane or walker, does not specify which he used more.)  Precautions:  Precautions  Hearing/Visual Limitations: (L) eye prosthetic  LE Weight Bearing Status: Right Non-Weight Bearing  Braces Applied: KI in place    Objective   Pain:  Pain Assessment  Pain Assessment: 0-10  0-10 (Numeric) Pain Score: 7  Pain Location: Leg  Pain Orientation: Right  Pain Interventions:  (medicated ~1-2 hours prior to therapy)  Cognition:  Cognition  Arousal/Alertness: Appropriate responses to stimuli  Orientation Level: Oriented X4  Following Commands: Follows one step commands consistently    General Assessments:     Activity Tolerance  Endurance: Tolerates 10 - 20 min exercise with multiple rests           Perception  Inattention/Neglect: Appears intact  Initiation: Appears intact  Motor Planning: Appears intact  Perseveration: Not present    Coordination  Movements are Fluid and Coordinated: Yes     Static Sitting Balance  Static Sitting-Balance Support:  ((L) foot supported)  Static Sitting-Level of Assistance: Close supervision    Static Standing Balance  Static Standing-Balance Support: Bilateral upper extremity supported  Static Standing-Level of Assistance: Contact guard, Minimum assistance  Static Standing-Comment/Number of Minutes: whw  Dynamic Standing Balance  Dynamic Standing-Balance Support: Bilateral upper extremity supported  Dynamic Standing-Level of Assistance: Minimum assistance (x2)  Functional Assessments:  Bed Mobility  Bed Mobility: Yes  Bed Mobility 1  Bed Mobility 1: Supine to sitting  Level of Assistance 1: Close supervision, Minimal verbal cues  Bed Mobility Comments 1: HOB slighlty  elevated  Bed Mobility 2  Bed Mobility  2: Sitting to supine  Level of Assistance 2: Close supervision  Bed Mobility Comments 2: self assists (R) LE with hands as needed    Transfers  Transfer: Yes  Transfer 1  Transfer From 1: Bed to  Transfer to 1: Stand  Transfer Device 1: Walker (bed height elevated)  Transfer Level of Assistance 1: Minimum assistance, +2, Moderate verbal cues  Transfers 2  Transfer From 2: Stand to  Transfer to 2: Bed  Transfer Device 2: Walker  Transfer Level of Assistance 2: Minimum assistance, +2, Moderate verbal cues    Ambulation/Gait Training  Ambulation/Gait Training Performed: Yes  Ambulation/Gait Training 1  Surface 1: Level tile  Device 1: Standard walker  Assistance 1: Minimum assistance, Minimal verbal cues (x2)  Quality of Gait 1: Decreased step length (increased postural sway, cues for safety)  Comments/Distance (ft) 1: side steps x 4 to (R) 2x, to (L) x 3 (limited distance attempted due to reports of ongoing lightheadedness.)  Extremity/Trunk Assessments:  RLE   RLE : Exceptions to WFL  Strength RLE  R Hip Flexion: 3/5  LLE   LLE : Exceptions to WFL  Strength LLE  LLE Overall Strength: Greater than or equal to 3/5 as evidenced by functional mobility  Treatments:  Therapeutic Exercise  Therapeutic Exercise Performed: Yes  Therapeutic Exercise Activity 1: (L) LE: LAQ and hip flexion x 12 reps each.  Therapeutic Exercise Activity 2: (R) LE hip flexion x 5 reps  Outcome Measures:  [facility] Basic Mobility  Turning from your back to your side while in a flat bed without using bedrails: A little  Moving from lying on your back to sitting on the side of a flat bed without using bedrails: A little  Moving to and from bed to chair (including a wheelchair): A lot  Standing up from a chair using your arms (e.g. wheelchair or bedside chair): A lot  To walk in hospital room: A lot  Climbing 3-5 steps with railing: Total  Basic Mobility - Total Score: 13    Encounter Problems       Encounter  Problems (Active)       PT Problem       Patient will ambulate >60' with LRAD and Supervision  (Progressing)       Start:  12/01/24    Expected End:  12/16/24            Patient will perform sit<>stand transfer with LRAD, and Supervision  (Progressing)       Start:  12/01/24    Expected End:  12/16/24            Patient will complete supine to sit and sit to supine Independent  (Progressing)       Start:  12/01/24    Expected End:  12/16/24            Pt will demo static/dynamic standing with LRAD and supervison x 2 min with 0 LOB (Progressing)       Start:  12/01/24    Expected End:  12/16/24                   Education Documentation  Precautions, taught by Xochilt Robb, PT at 12/3/2024  1:05 PM.  Learner: Patient  Readiness: Acceptance  Method: Explanation  Response: Verbalizes Understanding  Comment: use of KI/ possible use of limb protector in future as deemed appropriate by MD teams, PT POC    Mobility Training, taught by Xochilt Robb PT at 12/3/2024  1:05 PM.  Learner: Patient  Readiness: Acceptance  Method: Explanation  Response: Verbalizes Understanding  Comment: use of KI/ possible use of limb protector in future as deemed appropriate by MD teams, PT POC      12/03/24 at 1:09 PM - Xochilt Robb PT

## 2024-12-03 NOTE — SIGNIFICANT EVENT
Post-op Check  S:    POD 0 from R BKA and TMR    O:   Vital signs are stable, normotensive, afebrile, no new or worsening oxygen requirement, not tachycardic  Visit Vitals  /73   Pulse 71   Temp 35.9 °C (96.6 °F)   Resp 16        Constitutional: no acute distress  Skin: warm and dry overall   Neuro: A/O x4, no gross deficits   HEENT: Atraumatic, no scleral icterus  Cardiac: RRR  Pulmonary: Unlabored respirations   Abdomen: Non distended, non tender  GI: Voiding   BKA dressing clean without strikethrough or bleeding. Intact strength in BL LE.    A/P:  Overall, patient is doing well postoperatively with no acute concerns.  Will continue to monitor clinical exam, vitals, I&O's, and labs when available.  Will follow up on the patient in the a.m. or sooner as needed.    Bro Fitzgerald DO  PGY-1

## 2024-12-03 NOTE — PROGRESS NOTES
Vancomycin Dosing by Pharmacy- Cessation of Therapy    Consult to pharmacy for vancomycin dosing has been discontinued by the prescriber, pharmacy will sign off at this time.    Please call pharmacy if there are further questions or re-enter a consult if vancomycin is resumed.     Alexx Ellis, [facility]

## 2024-12-03 NOTE — CARE PLAN
Problem: Fall/Injury  Goal: Not fall by end of shift  Outcome: Progressing  Goal: Be free from injury by end of the shift  Outcome: Progressing  Goal: Verbalize understanding of personal risk factors for fall in the hospital  Outcome: Progressing  Goal: Verbalize understanding of risk factor reduction measures to prevent injury from fall in the home  Outcome: Progressing  Goal: Use assistive devices by end of the shift  Outcome: Progressing  Goal: Pace activities to prevent fatigue by end of the shift  Outcome: Progressing     Problem: Pain  Goal: Takes deep breaths with improved pain control throughout the shift  Outcome: Progressing  Goal: Turns in bed with improved pain control throughout the shift  Outcome: Progressing  Goal: Walks with improved pain control throughout the shift  Outcome: Progressing  Goal: Performs ADL's with improved pain control throughout shift  Outcome: Progressing  Goal: Participates in PT with improved pain control throughout the shift  Outcome: Progressing  Goal: Free from opioid side effects throughout the shift  Outcome: Progressing  Goal: Free from acute confusion related to pain meds throughout the shift  Outcome: Progressing     Problem: Skin  Goal: Decreased wound size/increased tissue granulation at next dressing change  Outcome: Progressing  Goal: Participates in plan/prevention/treatment measures  Outcome: Progressing  Goal: Prevent/manage excess moisture  Outcome: Progressing  Goal: Prevent/minimize sheer/friction injuries  Outcome: Progressing  Goal: Promote/optimize nutrition  Outcome: Progressing  Goal: Promote skin healing  Outcome: Progressing     Problem: Pain - Adult  Goal: Verbalizes/displays adequate comfort level or baseline comfort level  Outcome: Progressing     Problem: Safety - Adult  Goal: Free from fall injury  Outcome: Progressing     Problem: Discharge Planning  Goal: Discharge to home or other facility with appropriate resources  Outcome: Progressing      Problem: Chronic Conditions and Co-morbidities  Goal: Patient's chronic conditions and co-morbidity symptoms are monitored and maintained or improved  Outcome: Progressing     Problem: Diabetes  Goal: Achieve decreasing blood glucose levels by end of shift  Outcome: Progressing  Goal: Increase stability of blood glucose readings by end of shift  Outcome: Progressing  Goal: Decrease in ketones present in urine by end of shift  Outcome: Progressing  Goal: Maintain electrolyte levels within acceptable range throughout shift  Outcome: Progressing  Goal: Maintain glucose levels >70mg/dl to <250mg/dl throughout shift  Outcome: Progressing  Goal: No changes in neurological exam by end of shift  Outcome: Progressing  Goal: Learn about and adhere to nutrition recommendations by end of shift  Outcome: Progressing  Goal: Vital signs within normal range for age by end of shift  Outcome: Progressing  Goal: Increase self care and/or family involovement by end of shift  Outcome: Progressing  Goal: Receive DSME education by end of shift  Outcome: Progressing         The patient's goals for the shift include Patient pain will be controled during shift

## 2024-12-03 NOTE — DISCHARGE INSTRUCTIONS
Ashlyn Mr. Ace.    You were recently treated at [facility] for the infection in the bones in your right foot. The podiatrists here evaluated your foot and deemed that it could not be successfully treated with surgery or antibiotics, so our vascular surgery team amputated your right foot below the knee. You are being discharged to a skilled nursing facility where you will do rehabilitation to help get your strength back and learn to how to move and get around after your amputation.    You must be non-weight bearing in your right leg until the wound is fully healed.    If you notice any bleeding, swelling, redness, or pus coming out of your incision or develop fevers or chills, please seek care in the emergency department.    It was a pleasure taking care of you.    Sincerely,   Your [facility] care team

## 2024-12-03 NOTE — PROGRESS NOTES
VASCULAR SURGERY PROGRESS NOTE  Assessment/Plan   Dustin Ace is 74 y.o. male with history of emphysema, BPH, CVA hx, PAD, Afib (on Eliquis), HTN, DM who presented to the ED 11/25 with nonhealing wound of his right foot, fevers, chronic osteomyelitis. Right foot has been deemed unsalvageable. He is amenable to a R BKA.   Although febrile on arrival, vitals have stabilized, WBC count normalized on vanc/zosyn.     CTA coronaries showing multivessel disease -- per conversation with cardiology this morning, heart cath will not be needed. Will proceed with plans for amputation tomorrow in OR.    11/30: POD s/p R ankle disarticulation. Pain tolerable. Dressing changes twice daily.  12/2: R BKA formalization, TMR    Plan:  Okay to discontinue antibiotics after 24 hours from surgery  Okay to start DVT prophylaxis - please hold off on heparin drip today  Maintain ACE over R BKA wound, will take down tomorrow. Dressing underneath to remain until day of discharge   consult for stump guard  [place] per primary team  Multimodal pain regimen  Daily labs, recommend transfusing for Hgb < 8.0    D/w attending, Dr. Kody Brown MD  General Surgery PGY-3  Vascular Surgery [id number]    Subjective   No overnight issues.  His pain has been well tolerated. He feels hungry and plans on ordering breakfast.     Objective   Vitals:  Heart Rate:  [62-84]   Temp:  [35.9 °C (96.6 °F)-36.6 °C (97.9 °F)]   Resp:  [11-16]   BP: (115-155)/(55-78)   SpO2:  [96 %-100 %]     Exam:  Constitutional: No acute distress, resting comfortably  Neuro:  AOx3, grossly intact, some left eye ptosis at baseline  ENMT: moist mucous membranes  Head/neck: atraumatic  CV: no tachycardia  Pulm: non-labored on room air  GI: soft, non-tender, non-distended  Skin: warm and dry  Musculoskeletal: moving all extremities  Extremities: RLE BKA, wrapped in ACE, knee immobilizer in place    Labs:  Results from last 7 days   Lab Units 12/02/24  4391  12/02/24  0651 12/01/24  0555   WBC AUTO x10*3/uL 6.5 6.8 9.0   HEMOGLOBIN g/dL 9.2* 8.7* 8.2*   PLATELETS AUTO x10*3/uL 310 315 336      Results from last 7 days   Lab Units 12/02/24  1656 12/02/24  0651 12/01/24  1146   SODIUM mmol/L 141 138 136   POTASSIUM mmol/L 3.9 4.2 3.6   CHLORIDE mmol/L 110* 106 104   CO2 mmol/L 22 24 24   BUN mg/dL 8 9 10   CREATININE mg/dL 0.68 0.73 0.80   GLUCOSE mg/dL 159* 90 122*   MAGNESIUM mg/dL  --  1.85 1.79   PHOSPHORUS mg/dL  --  2.7 2.9      Results from last 7 days   Lab Units 12/02/24  0651 11/29/24  1625 11/28/24  1616   INR  1.4* 1.3* 1.1   PROTIME seconds 15.5* 14.1* 12.9*   APTT seconds 22* 31  --       Results from last 7 days   Lab Units 12/01/24  2247 12/01/24  1146 12/01/24  0555   ANTI XA UNFRACTIONATED IU/mL 0.1 <0.1 1.4*

## 2024-12-03 NOTE — ANESTHESIA POSTPROCEDURE EVALUATION
Patient: Dustin Ace    Procedure Summary       Date: 12/02/24 Room / Location: [facility] OR 14 / [facility] OR    Anesthesia Start: 1117 Anesthesia Stop: 1622    Procedure: formalization of R BKA with TMR (Right) Diagnosis:       Osteomyelitis of right foot, unspecified type (Multi)      (Osteomyelitis of right foot, unspecified type (Multi) [M86.9])    Surgeons: Sis Gates MD Responsible Provider: Hemanth Garcia MD    Anesthesia Type: general ASA Status: 3            Anesthesia Type: general    Vitals Value Taken Time   /66 12/02/24 1745   Temp 36.1 °C (97 °F) 12/02/24 1745   Pulse 78 12/02/24 1745   Resp 14 12/02/24 1745   SpO2 99 % 12/02/24 1745       Anesthesia Post Evaluation    Patient location during evaluation: PACU  Patient participation: complete - patient participated  Level of consciousness: awake and alert  Pain management: adequate  Multimodal analgesia pain management approach  Airway patency: patent  Cardiovascular status: acceptable  Respiratory status: acceptable  Hydration status: acceptable  Postoperative Nausea and Vomiting: none        There were no known notable events for this encounter.

## 2024-12-03 NOTE — PROGRESS NOTES
12/3/24 1016 Transitional Care Coordinator Notes:    Transitional Care Coordination Progress Note:  Patient discussed during interdisciplinary rounds.   Team members present: MD and TCC  Plan per Medical/Surgical team: Met with patient to discuss rehab facilities. Patient stated he went to [facility] in the past and would like a referral place there. Team will restart patient oral coagulant and monitor for bleeding x 24 hours.  Payor: [employer]  Discharge disposition: SNF  Potential Barriers: none  ADOD: 1-3 days                        Assessment/Plan   Assessment & Plan  Osteomyelitis of right foot, unspecified type (Multi)    HTN (hypertension)    Paroxysmal atrial fibrillation (Multi)    JOSE (obstructive sleep apnea)    CVA (cerebral vascular accident) (Multi)    Pulmonary emphysema (Multi)    Diabetes mellitus, type 2 (Multi)    Hyperlipidemia    CAD (coronary artery disease)    Hepatitis C               Kristina Paul RN

## 2024-12-04 PROBLEM — T14.8XXA CHRONIC WOUND: Status: ACTIVE | Noted: 2024-12-04

## 2024-12-04 LAB
ALBUMIN SERPL BCP-MCNC: 3.4 G/DL (ref 3.4–5)
ANION GAP SERPL CALC-SCNC: 13 MMOL/L (ref 10–20)
BASOPHILS # BLD AUTO: 0.02 X10*3/UL (ref 0–0.1)
BASOPHILS NFR BLD AUTO: 0.2 %
BUN SERPL-MCNC: 11 MG/DL (ref 6–23)
CALCIUM SERPL-MCNC: 8.9 MG/DL (ref 8.6–10.6)
CHLORIDE SERPL-SCNC: 105 MMOL/L (ref 98–107)
CO2 SERPL-SCNC: 26 MMOL/L (ref 21–32)
CREAT SERPL-MCNC: 0.56 MG/DL (ref 0.5–1.3)
EGFRCR SERPLBLD CKD-EPI 2021: >90 ML/MIN/1.73M*2
EOSINOPHIL # BLD AUTO: 0.02 X10*3/UL (ref 0–0.4)
EOSINOPHIL NFR BLD AUTO: 0.2 %
ERYTHROCYTE [DISTWIDTH] IN BLOOD BY AUTOMATED COUNT: 17.2 % (ref 11.5–14.5)
GLUCOSE BLD MANUAL STRIP-MCNC: 119 MG/DL (ref 74–99)
GLUCOSE BLD MANUAL STRIP-MCNC: 122 MG/DL (ref 74–99)
GLUCOSE BLD MANUAL STRIP-MCNC: 155 MG/DL (ref 74–99)
GLUCOSE BLD MANUAL STRIP-MCNC: 163 MG/DL (ref 74–99)
GLUCOSE SERPL-MCNC: 142 MG/DL (ref 74–99)
HCT VFR BLD AUTO: 29.4 % (ref 41–52)
HGB BLD-MCNC: 9.2 G/DL (ref 13.5–17.5)
IMM GRANULOCYTES # BLD AUTO: 0.06 X10*3/UL (ref 0–0.5)
IMM GRANULOCYTES NFR BLD AUTO: 0.5 % (ref 0–0.9)
LYMPHOCYTES # BLD AUTO: 3.51 X10*3/UL (ref 0.8–3)
LYMPHOCYTES NFR BLD AUTO: 28.2 %
MAGNESIUM SERPL-MCNC: 2.06 MG/DL (ref 1.6–2.4)
MCH RBC QN AUTO: 28.1 PG (ref 26–34)
MCHC RBC AUTO-ENTMCNC: 31.3 G/DL (ref 32–36)
MCV RBC AUTO: 90 FL (ref 80–100)
MONOCYTES # BLD AUTO: 0.99 X10*3/UL (ref 0.05–0.8)
MONOCYTES NFR BLD AUTO: 8 %
NEUTROPHILS # BLD AUTO: 7.85 X10*3/UL (ref 1.6–5.5)
NEUTROPHILS NFR BLD AUTO: 62.9 %
NRBC BLD-RTO: 0 /100 WBCS (ref 0–0)
PHOSPHATE SERPL-MCNC: 2.7 MG/DL (ref 2.5–4.9)
PLATELET # BLD AUTO: 302 X10*3/UL (ref 150–450)
POTASSIUM SERPL-SCNC: 3.7 MMOL/L (ref 3.5–5.3)
RBC # BLD AUTO: 3.27 X10*6/UL (ref 4.5–5.9)
SODIUM SERPL-SCNC: 140 MMOL/L (ref 136–145)
UFH PPP CHRO-ACNC: <0.1 IU/ML
WBC # BLD AUTO: 12.5 X10*3/UL (ref 4.4–11.3)

## 2024-12-04 PROCEDURE — 1200000002 HC GENERAL ROOM WITH TELEMETRY DAILY

## 2024-12-04 PROCEDURE — 2500000004 HC RX 250 GENERAL PHARMACY W/ HCPCS (ALT 636 FOR OP/ED): Performed by: STUDENT IN AN ORGANIZED HEALTH CARE EDUCATION/TRAINING PROGRAM

## 2024-12-04 PROCEDURE — 85025 COMPLETE CBC W/AUTO DIFF WBC: CPT

## 2024-12-04 PROCEDURE — 2500000004 HC RX 250 GENERAL PHARMACY W/ HCPCS (ALT 636 FOR OP/ED)

## 2024-12-04 PROCEDURE — 2500000002 HC RX 250 W HCPCS SELF ADMINISTERED DRUGS (ALT 637 FOR MEDICARE OP, ALT 636 FOR OP/ED)

## 2024-12-04 PROCEDURE — 99232 SBSQ HOSP IP/OBS MODERATE 35: CPT

## 2024-12-04 PROCEDURE — 2500000001 HC RX 250 WO HCPCS SELF ADMINISTERED DRUGS (ALT 637 FOR MEDICARE OP)

## 2024-12-04 PROCEDURE — 82947 ASSAY GLUCOSE BLOOD QUANT: CPT

## 2024-12-04 PROCEDURE — 85520 HEPARIN ASSAY: CPT | Performed by: STUDENT IN AN ORGANIZED HEALTH CARE EDUCATION/TRAINING PROGRAM

## 2024-12-04 PROCEDURE — 97535 SELF CARE MNGMENT TRAINING: CPT | Mod: GO

## 2024-12-04 PROCEDURE — 80069 RENAL FUNCTION PANEL: CPT | Performed by: STUDENT IN AN ORGANIZED HEALTH CARE EDUCATION/TRAINING PROGRAM

## 2024-12-04 PROCEDURE — 83735 ASSAY OF MAGNESIUM: CPT

## 2024-12-04 PROCEDURE — 97530 THERAPEUTIC ACTIVITIES: CPT | Mod: GO

## 2024-12-04 RX ORDER — INSULIN LISPRO 100 [IU]/ML
0-5 INJECTION, SOLUTION INTRAVENOUS; SUBCUTANEOUS
Start: 2024-12-04

## 2024-12-04 RX ORDER — OXYCODONE HYDROCHLORIDE 5 MG/1
5 TABLET ORAL EVERY 4 HOURS PRN
Start: 2024-12-04 | End: 2024-12-05

## 2024-12-04 RX ORDER — B-COMPLEX WITH VITAMIN C
1 TABLET ORAL DAILY
Start: 2024-12-05

## 2024-12-04 RX ORDER — METHOCARBAMOL 500 MG/1
500 TABLET, FILM COATED ORAL EVERY 8 HOURS PRN
Status: DISCONTINUED | OUTPATIENT
Start: 2024-12-04 | End: 2024-12-05

## 2024-12-04 RX ORDER — SODIUM CHLORIDE 0.9 % (FLUSH) 0.9 %
10 SYRINGE (ML) INJECTION AS NEEDED
Start: 2024-12-04 | End: 2024-12-06 | Stop reason: HOSPADM

## 2024-12-04 RX ORDER — ALUMINUM HYDROXIDE, MAGNESIUM HYDROXIDE, AND SIMETHICONE 1200; 120; 1200 MG/30ML; MG/30ML; MG/30ML
30 SUSPENSION ORAL EVERY 6 HOURS PRN
Start: 2024-12-04

## 2024-12-04 RX ORDER — ACETAMINOPHEN 325 MG/1
975 TABLET ORAL EVERY 8 HOURS
Start: 2024-12-04 | End: 2024-12-06

## 2024-12-04 RX ORDER — METHOCARBAMOL 500 MG/1
500 TABLET, FILM COATED ORAL EVERY 8 HOURS PRN
Start: 2024-12-04 | End: 2024-12-05

## 2024-12-04 RX ORDER — DIAZEPAM 5 MG/1
5 TABLET ORAL DAILY PRN
Start: 2024-12-04

## 2024-12-04 RX ORDER — HEPARIN SODIUM 10000 [USP'U]/100ML
0-4000 INJECTION, SOLUTION INTRAVENOUS CONTINUOUS
Status: DISCONTINUED | OUTPATIENT
Start: 2024-12-04 | End: 2024-12-06

## 2024-12-04 RX ORDER — GABAPENTIN 300 MG/1
300 CAPSULE ORAL 3 TIMES DAILY
Start: 2024-12-04

## 2024-12-04 RX ORDER — POLYETHYLENE GLYCOL 3350 17 G/17G
17 POWDER, FOR SOLUTION ORAL DAILY
Start: 2024-12-05

## 2024-12-04 ASSESSMENT — COGNITIVE AND FUNCTIONAL STATUS - GENERAL
DRESSING REGULAR UPPER BODY CLOTHING: A LITTLE
MOBILITY SCORE: 18
DRESSING REGULAR LOWER BODY CLOTHING: A LOT
HELP NEEDED FOR BATHING: A LITTLE
CLIMB 3 TO 5 STEPS WITH RAILING: TOTAL
STANDING UP FROM CHAIR USING ARMS: A LITTLE
WALKING IN HOSPITAL ROOM: A LITTLE
MOVING TO AND FROM BED TO CHAIR: A LITTLE
HELP NEEDED FOR BATHING: A LITTLE
DAILY ACTIVITIY SCORE: 17
PERSONAL GROOMING: A LITTLE
DAILY ACTIVITIY SCORE: 22
TOILETING: A LITTLE
TOILETING: A LOT

## 2024-12-04 ASSESSMENT — PAIN SCALES - GENERAL
PAINLEVEL_OUTOF10: 9
PAINLEVEL_OUTOF10: 7
PAINLEVEL_OUTOF10: 7
PAINLEVEL_OUTOF10: 0 - NO PAIN
PAINLEVEL_OUTOF10: 2
PAINLEVEL_OUTOF10: 8
PAINLEVEL_OUTOF10: 8
PAINLEVEL_OUTOF10: 7
PAINLEVEL_OUTOF10: 8

## 2024-12-04 ASSESSMENT — PAIN - FUNCTIONAL ASSESSMENT
PAIN_FUNCTIONAL_ASSESSMENT: 0-10

## 2024-12-04 ASSESSMENT — PAIN DESCRIPTION - ORIENTATION
ORIENTATION: RIGHT;LOWER
ORIENTATION: RIGHT

## 2024-12-04 ASSESSMENT — PAIN SCALES - PAIN ASSESSMENT IN ADVANCED DEMENTIA (PAINAD): TOTALSCORE: MEDICATION (SEE MAR)

## 2024-12-04 ASSESSMENT — PAIN DESCRIPTION - DESCRIPTORS: DESCRIPTORS: ACHING;DISCOMFORT

## 2024-12-04 ASSESSMENT — PAIN DESCRIPTION - LOCATION
LOCATION: LEG

## 2024-12-04 ASSESSMENT — ACTIVITIES OF DAILY LIVING (ADL): HOME_MANAGEMENT_TIME_ENTRY: 24

## 2024-12-04 NOTE — CARE PLAN
The patient's goals for the shift include Patient pain will be controled during shift    The clinical goals for the shift include patient remains free from injury this shift        Problem: Fall/Injury  Goal: Not fall by end of shift  Outcome: Progressing  Goal: Be free from injury by end of the shift  Outcome: Progressing  Goal: Verbalize understanding of personal risk factors for fall in the hospital  Outcome: Progressing  Goal: Use assistive devices by end of the shift  Outcome: Progressing     Problem: Pain  Goal: Turns in bed with improved pain control throughout the shift  Outcome: Progressing  Goal: Free from opioid side effects throughout the shift  Outcome: Progressing     Problem: Skin  Goal: Promote/optimize nutrition  12/4/2024 1740 by Karolina Hartmann RN  Outcome: Progressing  12/4/2024 1739 by Karolina Hartmann RN  Flowsheets (Taken 12/4/2024 1739)  Promote/optimize nutrition:   Offer water/supplements/favorite foods   Monitor/record intake including meals     Problem: Safety - Adult  Goal: Free from fall injury  Outcome: Progressing

## 2024-12-04 NOTE — PROGRESS NOTES
Occupational Therapy    Occupational Therapy Treatment    Name: Dustin Ace  MRN: [id number]  : 1950  Date: 24  Room: [address] [place], [place] [address]      Time Calculation  Start Time: 1216  Stop Time: 1255  Time Calculation (min): 39 min    Assessment:  End of Session Communication: Bedside nurse  End of Session Patient Position: Bed, 3 rail up, Alarm off, not on at start of session  Plan:  Treatment Interventions:  (ADL retraining; Functional transfer training; UE strengthening/ROM; Endurance training; Patient/family training; Equipment evaluation/education; Compensatory technique education; Continued evaluation)  OT Frequency: 3 times per week  OT Discharge Recommendations: Moderate intensity level of continued care  Equipment Recommended upon Discharge:  (TBD)  OT - OK to Discharge: Yes    Subjective   General:  OT Last Visit  OT Received On: 24  Reason for Referral: s/p (R) BKA formalization, TMR 2024  Past Medical History Relevant to Rehab: s/p (R) ankle disarticulation . emphysema, CVA hx, PAD; Afib,  HTN, DM, (L) eye prosthetic  Prior to Session Communication: Bedside nurse  Patient Position Received: Bed, 3 rail up, Alarm off, not on at start of session  Family/Caregiver Present: No  General Comment: PLEASANT AND HIGHLY MOTIVATED FOR FULL PARTICIPATION   Precautions:  Hearing/Visual Limitations: (L) eye prosthetic  LE Weight Bearing Status: Right Non-Weight Bearing  Medical Precautions: Fall precautions  Braces Applied: R/STUMP GUARD DONNED  Precautions Comment: R ankle disarticulation (;NO BP RUE; LUE IV)  Vitals:    Lines/Tubes/Drains:  Midline 24 Single lumen Left Basilic vein (Active)   Number of days: 3       Cognition:  Overall Cognitive Status: Within Functional Limits  Orientation Level: Oriented X4  Following Commands: Follows all commands and directions without difficulty    Pain Assessment:  Pain Assessment  Pain Assessment: 0-10  0-10 (Numeric) Pain Score: 8  Pain Location:   (R/RESIDUAL LIMB)  Pain Interventions: Repositioned (REPORTED DECREASED PAIN UPON POSITION CHANGE/TF)  Response to Interventions:  (REPORTED DECREASED PAIN AND INCREASED READINESS FOR PARTICIPATION)     Objective   Activities of Daily Living:   Grooming  Grooming Level of Assistance: Setup  Grooming Where Assessed: Edge of bed   LE Dressing  LE Dressing: Yes  Pants Level of Assistance: Minimum assistance  LE Dressing Where Assessed: Edge of bed  LE Dressing Comments: S/P INSTRUCTION FOR USE OF COMPENSATORY LB ADL STRATEGIES  Bed Mobility/Transfers:   Bed Mobility  Bed Mobility: Yes  Bed Mobility 1  Bed Mobility 1: Supine to sitting, Sitting to supine  Level of Assistance 1: Close supervision  Transfers  Transfer: Yes  Transfer 1  Transfer From 1:  (MIN A SIT < > STAND FROM EOB < > WH W)  Balance:  Dynamic Sitting Balance  Dynamic Sitting-Balance Support: Right upper extremity supported, Left upper extremity supported  Dynamic Sitting-Level of Assistance: Contact guard  Dynamic Standing Balance  Dynamic Standing-Balance Support: Bilateral upper extremity supported  Dynamic Standing-Level of Assistance: Minimum assistance  Dynamic Standing-Comments: TOLERATED x5 LATERAL SIDE STEPS AT WH W LEVEL  Static Sitting Balance  Static Sitting-Balance Support: Bilateral upper extremity supported  Static Sitting-Level of Assistance: Distant supervision  Static Sitting-Comment/Number of Minutes: >/= 6 MINUTES  Outcome Measures:  [facility] Daily Activity  Putting on and taking off regular lower body clothing: A lot  Bathing (including washing, rinsing, drying): A little  Putting on and taking off regular upper body clothing: A little  Toileting, which includes using toilet, bedpan or urinal: A lot  Taking care of personal grooming such as brushing teeth: A little  Eating Meals: None  Daily Activity - Total Score: 17     Education Documentation  Body Mechanics, taught by Eva Ruiz OT at 12/4/2024  3:10 PM.  Learner:  Patient  Readiness: Eager  Method: Explanation  Response: Verbalizes Understanding    Precautions, taught by Eva Ruiz OT at 12/4/2024  3:10 PM.  Learner: Patient  Readiness: Eager  Method: Explanation  Response: Verbalizes Understanding    ADL Training, taught by Eva Ruiz OT at 12/4/2024  3:10 PM.  Learner: Patient  Readiness: Eager  Method: Explanation  Response: Verbalizes Understanding    Education Comments  No comments found.        Goals:  Encounter Problems       Encounter Problems (Active)       ADLs       Patient will perform UB and LB bathing  with modified independent level of assistance and extended tub bench and long-handled sponge. (Progressing)       Start:  12/03/24    Expected End:  12/17/24            Patient with complete upper body dressing with independent level of assistance donning and doffing all UE clothes with no adaptive equipment while supported sitting and edge of bed  (Progressing)       Start:  12/03/24    Expected End:  12/17/24            Patient with complete lower body dressing with modified independent level of assistance donning and doffing all LE clothes  with PRN adaptive equipment while standing (Progressing)       Start:  12/03/24    Expected End:  12/17/24            Patient will complete daily grooming tasks brushing teeth and washing face/hair with modified independent level of assistance and PRN adaptive equipment while edge of bed  vs. standing at sink pending progress. (Progressing)       Start:  12/03/24    Expected End:  12/17/24            Patient will complete toileting including hygiene clothing management/hygiene with modified independent level of assistance and raised toilet seat. (Progressing)       Start:  12/03/24    Expected End:  12/17/24               BALANCE       Pt will maintain dynamic standing balance during ADL task with modified independent level of assistance in order to demonstrate decreased risk of falling and improved  postural control. (Progressing)       Start:  12/03/24    Expected End:  12/17/24               MOBILITY       Patient will perform Functional mobility  Household distances/Community Distances with modified independent level of assistance and least restrictive device in order to improve safety and functional mobility. (Progressing)       Start:  12/03/24    Expected End:  12/17/24               TRANSFERS       Patient will perform bed mobility at independent level using safe technique in order to improve safety and independence with mobility (Progressing)       Start:  12/03/24    Expected End:  12/17/24            Patient will complete functional transfer to all surfaces with least restrictive device with modified independent level of assistance. (Progressing)       Start:  12/03/24    Expected End:  12/17/24 12/04/24 at 3:12 PM   Eva Ruiz, OT   [phone]

## 2024-12-04 NOTE — PROGRESS NOTES
12/4/24 1151 Transitional Care Coordinator Notes:    Transitional Care Coordination Progress Note:  Patient discussed during interdisciplinary rounds.   Team members present: MD and TCC  Plan per Medical/Surgical team: [facility] can not accept. Referrals placed near patient home address.  Payor: [employer]  Discharge disposition: SNF  Potential Barriers: none  ADOD: 1-3 days                        Assessment/Plan   Assessment & Plan  Osteomyelitis of right foot, unspecified type (Multi)    HTN (hypertension)    Paroxysmal atrial fibrillation (Multi)    JOSE (obstructive sleep apnea)    CVA (cerebral vascular accident) (Multi)    Pulmonary emphysema (Multi)    Diabetes mellitus, type 2 (Multi)    Hyperlipidemia    CAD (coronary artery disease)    Hepatitis C               Kristina Paul, RN

## 2024-12-04 NOTE — PROGRESS NOTES
Subjective   NAEO. Pain controlled. No chest pain, shortness of breath, abdominal pain.       Objective     Last Recorded Vitals  /73   Pulse 68   Temp 36.2 °C (97.2 °F)   Resp 17   Wt 90.7 kg (199 lb 15.3 oz)   SpO2 100%   Intake/Output last 3 Shifts:    Intake/Output Summary (Last 24 hours) at 12/4/2024 0632  Last data filed at 12/3/2024 2303  Gross per 24 hour   Intake 490 ml   Output 1600 ml   Net -1110 ml       Admission Weight  Weight: 90.7 kg (200 lb) (11/25/24 1123)    Daily Weight  11/29/24 : 90.7 kg (199 lb 15.3 oz)    Image Results  Bedside Midline Imaging  These images are not reportable by radiology and will not be interpreted   by  Radiologists.      Physical Exam  Constitutional:       General: He is not in acute distress.     Appearance: He is not ill-appearing.   HENT:      Head: Normocephalic and atraumatic.      Mouth/Throat:      Mouth: Mucous membranes are moist.   Eyes:      Extraocular Movements: Extraocular movements intact.      Pupils: Pupils are equal, round, and reactive to light.      Comments: Prosthetic left eye   Cardiovascular:      Rate and Rhythm: Normal rate and regular rhythm.      Heart sounds: Normal heart sounds.   Pulmonary:      Effort: Pulmonary effort is normal.      Breath sounds: Normal breath sounds.   Abdominal:      General: Abdomen is flat.      Palpations: Abdomen is soft.   Musculoskeletal:      Left lower leg: No edema.      Comments: R BKA wrapped with stump guard in place   Feet:      Comments: Left hallux amputation with dry skin. Appears intact. Noted callus under the first MTP.     Neurological:      Mental Status: He is alert and oriented to person, place, and time.         Relevant Results               Assessment/Plan      Assessment & Plan  Osteomyelitis of right foot, unspecified type (Multi)    HTN (hypertension)    Paroxysmal atrial fibrillation (Multi)    JOSE (obstructive sleep apnea)    CVA (cerebral vascular accident)  (Multi)    Pulmonary emphysema (Multi)    Diabetes mellitus, type 2 (Multi)    Hyperlipidemia    CAD (coronary artery disease)    Hepatitis C    Dustin Ace is a 74 y.o. male with a history of BPH, centrilobular emphysema, chronic hepatitis C (treated), CVA, diabetes mellitus, hypertension, PAD, history of Charcot deformity, chronic right foot osteomyelitis, paroxysmal atrial fibrillation presenting for a chronic R foot wound. Patient met SIRS criteria on admission. Started on vanc and zosyn and got 500mL bolus, vitals now improved. MRI confirmed R foot OM. To undergo R BKA with vascular surgery.    Updates 12/4:  -WBC increased 12.5 from 11.7 likely secondary to surgery  -Restart heparin drip today to monitor for bleeding  -Plan for discharge to SNF possibly tomorrow    #Chronic infected right leg wound s/p multiple I&D's  #SIRS secondary to infected wound (resolved)  #Osteomyelitis  #Peripheral vascular disease  #Charcot deformity and neuropathy  #S/p R BKA  - CRP: 16.25, ESR: 96  - SIRS criteria with source of infection, though no signs of end-organ damage currently, vitals improved with vanc, zosyn, and IVF bolus  - MRI showed OM in R foot  - Patient seen by podiatry, deemed R foot unsalvageable and recommended BKA  - ARMANDO showed mild disease in RLE, none in LLE    Cultures:  BCx (11/25): NGTD  Wound cultures (11/25): Pseudomonas and Strep gordonii     Antibiotics:  Vancomycin (11/25 - 12/3)  Zosyn (11/25 - 12/3)     Plan:  -R BKA with vascular surgery 11/29  -Formalization of R BKA on 12/2  -Stopped vancomycin and Zosyn   -Restart heparin drip today to monitor for bleeding  -Patient will go to SNF once medically ready       #Diabetes mellitus  - A1C 5.6 on 11/25  Plan:  - POC glucose checks, will add sliding scale insulin if needed     #HTN  - Hold amlodipine given normotension  Reintroduce as appropriate     #Paroxysmal Afib  - Hold apixaban for possible procedure/surgical intervention.   -Restart heparin drip  today to monitor for bleeding    #HLD  -continue home atorvastatin     #Anemia of chronic disease  - Baseline of approximately 12-13  - Presented at 9.0. Normocytic with elevated RDW  - Iron 25 (low), TIBC (low), UIBC 165, 13% sat, ferritin of 1,087, suggestive of anemia of chronic disease  - Retic absolute: 0.086, retic %: 2.7%. RPI <2, indicates inadequate response  Plan:   - Treat underlying infection     #Insomnia  #Anxiety  - Patient reports taking it approximately once per month  - Hold diazepam. Will give anxiolytic if needed     #BPH  - Continue tamsulosin     #MISC  - Continue B-complex with vitamin C  - Continue vitamin D  - Continue Maalox     F: PRN  E: PRN  N: regular diet  A: PIV  Bowel regimen: Miralax     DVT ppx: Holding home apixaban, heparin drip   GI ppx: None    Code Status: Full code (confirmed on admission)  Surrogate Medical Decision-maker: Coco Finley (daughter): [phone]; Yanet Oropeza (son): [phone]

## 2024-12-04 NOTE — PROGRESS NOTES
VASCULAR SURGERY PROGRESS NOTE  Assessment/Plan   Dustin Ace is 74 y.o. male with history of emphysema, BPH, CVA hx, PAD, Afib (on Eliquis), HTN, DM who presented to the ED 11/25 with nonhealing wound of his right foot, fevers, chronic osteomyelitis. Right foot has been deemed unsalvageable. He is amenable to a R BKA.   Although febrile on arrival, vitals have stabilized, WBC count normalized on vanc/zosyn.     CTA coronaries showing multivessel disease -- per conversation with cardiology this morning, heart cath will not be needed. Will proceed with plans for amputation tomorrow in OR.    11/30: POD s/p R ankle disarticulation. Pain tolerable. Dressing changes twice daily.  12/2: R BKA formalization, TMR    Plan:  Okay to start heparin drip today, monitor for signs of bleeding  Will remove mepilex dressing on day of discharge   consult for stump guard - obtained  Multimodal pain regimen  Daily labs, recommend transfusing for Hgb < 8.0    D/w attending, Dr. Kody Brown MD  General Surgery PGY-3  Vascular Surgery [id number]    Subjective   No overnight issues.  His pain has been worse. Eating okay. Able to move leg okay,  in place.     Objective   Vitals:  Heart Rate:  [64-78]   Temp:  [35.8 °C (96.4 °F)-36.5 °C (97.7 °F)]   Resp:  [17-18]   BP: (102-157)/(64-81)   SpO2:  [99 %-100 %]     Exam:  Constitutional: No acute distress, resting comfortably  Neuro:  AOx3, grossly intact, some left eye ptosis at baseline  ENMT: moist mucous membranes  Head/neck: atraumatic  CV: no tachycardia  Pulm: non-labored on room air  GI: soft, non-tender, non-distended  Skin: warm and dry  Musculoskeletal: moving all extremities  Extremities: RLE BKA,  in place    Labs:  Results from last 7 days   Lab Units 12/04/24  0603 12/03/24  1231 12/02/24  1656   WBC AUTO x10*3/uL 12.5* 11.7* 6.5   HEMOGLOBIN g/dL 9.2* 8.7* 9.2*   PLATELETS AUTO x10*3/uL 302 303 310      Results from last 7 days   Lab Units  12/04/24  0603 12/03/24  1231 12/02/24  1656   SODIUM mmol/L 140 138 141   POTASSIUM mmol/L 3.7 3.9 3.9   CHLORIDE mmol/L 105 106 110*   CO2 mmol/L 26 22 22   BUN mg/dL 11 9 8   CREATININE mg/dL 0.56 0.68 0.68   GLUCOSE mg/dL 142* 198* 159*   MAGNESIUM mg/dL 2.06 2.21  --    PHOSPHORUS mg/dL 2.7 2.0*  --       Results from last 7 days   Lab Units 12/02/24  0651 11/29/24  1625 11/28/24  1616 11/28/24  1616   INR  1.4* 1.3*  --  1.1   PROTIME seconds 15.5* 14.1*  --  12.9*   APTT seconds 22* 31   < >  --     < > = values in this interval not displayed.      Results from last 7 days   Lab Units 12/01/24  2247 12/01/24  1146 12/01/24  0555   ANTI XA UNFRACTIONATED IU/mL 0.1 <0.1 1.4*

## 2024-12-04 NOTE — CARE PLAN
The patient's goals for the shift include Patient pain will be controled during shift    The clinical goals for the shift include Pt will tolerate ADL's with adequate pain control by the end of shift    O

## 2024-12-05 LAB
ALBUMIN SERPL BCP-MCNC: 3.2 G/DL (ref 3.4–5)
ANION GAP SERPL CALC-SCNC: 15 MMOL/L (ref 10–20)
BASOPHILS # BLD AUTO: 0.04 X10*3/UL (ref 0–0.1)
BASOPHILS NFR BLD AUTO: 0.3 %
BUN SERPL-MCNC: 15 MG/DL (ref 6–23)
CALCIUM SERPL-MCNC: 8.7 MG/DL (ref 8.6–10.6)
CHLORIDE SERPL-SCNC: 103 MMOL/L (ref 98–107)
CO2 SERPL-SCNC: 25 MMOL/L (ref 21–32)
CREAT SERPL-MCNC: 0.56 MG/DL (ref 0.5–1.3)
EGFRCR SERPLBLD CKD-EPI 2021: >90 ML/MIN/1.73M*2
EOSINOPHIL # BLD AUTO: 0.08 X10*3/UL (ref 0–0.4)
EOSINOPHIL NFR BLD AUTO: 0.6 %
ERYTHROCYTE [DISTWIDTH] IN BLOOD BY AUTOMATED COUNT: 17.7 % (ref 11.5–14.5)
GLUCOSE BLD MANUAL STRIP-MCNC: 129 MG/DL (ref 74–99)
GLUCOSE BLD MANUAL STRIP-MCNC: 130 MG/DL (ref 74–99)
GLUCOSE BLD MANUAL STRIP-MCNC: 132 MG/DL (ref 74–99)
GLUCOSE BLD MANUAL STRIP-MCNC: 160 MG/DL (ref 74–99)
GLUCOSE SERPL-MCNC: 102 MG/DL (ref 74–99)
HCT VFR BLD AUTO: 32.1 % (ref 41–52)
HGB BLD-MCNC: 10 G/DL (ref 13.5–17.5)
IMM GRANULOCYTES # BLD AUTO: 0.07 X10*3/UL (ref 0–0.5)
IMM GRANULOCYTES NFR BLD AUTO: 0.5 % (ref 0–0.9)
LYMPHOCYTES # BLD AUTO: 5.09 X10*3/UL (ref 0.8–3)
LYMPHOCYTES NFR BLD AUTO: 38.8 %
MAGNESIUM SERPL-MCNC: 1.82 MG/DL (ref 1.6–2.4)
MCH RBC QN AUTO: 28.5 PG (ref 26–34)
MCHC RBC AUTO-ENTMCNC: 31.2 G/DL (ref 32–36)
MCV RBC AUTO: 92 FL (ref 80–100)
MONOCYTES # BLD AUTO: 1.04 X10*3/UL (ref 0.05–0.8)
MONOCYTES NFR BLD AUTO: 7.9 %
NEUTROPHILS # BLD AUTO: 6.81 X10*3/UL (ref 1.6–5.5)
NEUTROPHILS NFR BLD AUTO: 51.9 %
NRBC BLD-RTO: 0 /100 WBCS (ref 0–0)
PHOSPHATE SERPL-MCNC: 3.1 MG/DL (ref 2.5–4.9)
PLATELET # BLD AUTO: 339 X10*3/UL (ref 150–450)
POTASSIUM SERPL-SCNC: 4 MMOL/L (ref 3.5–5.3)
RBC # BLD AUTO: 3.51 X10*6/UL (ref 4.5–5.9)
SODIUM SERPL-SCNC: 139 MMOL/L (ref 136–145)
UFH PPP CHRO-ACNC: 0.2 IU/ML
UFH PPP CHRO-ACNC: 0.3 IU/ML
UFH PPP CHRO-ACNC: 0.4 IU/ML
WBC # BLD AUTO: 13.1 X10*3/UL (ref 4.4–11.3)

## 2024-12-05 PROCEDURE — 2500000001 HC RX 250 WO HCPCS SELF ADMINISTERED DRUGS (ALT 637 FOR MEDICARE OP)

## 2024-12-05 PROCEDURE — 2500000002 HC RX 250 W HCPCS SELF ADMINISTERED DRUGS (ALT 637 FOR MEDICARE OP, ALT 636 FOR OP/ED)

## 2024-12-05 PROCEDURE — 85520 HEPARIN ASSAY: CPT | Performed by: STUDENT IN AN ORGANIZED HEALTH CARE EDUCATION/TRAINING PROGRAM

## 2024-12-05 PROCEDURE — 99232 SBSQ HOSP IP/OBS MODERATE 35: CPT

## 2024-12-05 PROCEDURE — 97530 THERAPEUTIC ACTIVITIES: CPT | Mod: GP

## 2024-12-05 PROCEDURE — 2500000004 HC RX 250 GENERAL PHARMACY W/ HCPCS (ALT 636 FOR OP/ED): Performed by: STUDENT IN AN ORGANIZED HEALTH CARE EDUCATION/TRAINING PROGRAM

## 2024-12-05 PROCEDURE — 2500000004 HC RX 250 GENERAL PHARMACY W/ HCPCS (ALT 636 FOR OP/ED)

## 2024-12-05 PROCEDURE — 97116 GAIT TRAINING THERAPY: CPT | Mod: GP

## 2024-12-05 PROCEDURE — 82947 ASSAY GLUCOSE BLOOD QUANT: CPT

## 2024-12-05 PROCEDURE — 1200000002 HC GENERAL ROOM WITH TELEMETRY DAILY

## 2024-12-05 PROCEDURE — 83735 ASSAY OF MAGNESIUM: CPT

## 2024-12-05 PROCEDURE — 80069 RENAL FUNCTION PANEL: CPT

## 2024-12-05 PROCEDURE — 85025 COMPLETE CBC W/AUTO DIFF WBC: CPT

## 2024-12-05 RX ORDER — AMLODIPINE BESYLATE 5 MG/1
5 TABLET ORAL DAILY
Status: DISCONTINUED | OUTPATIENT
Start: 2024-12-05 | End: 2024-12-07 | Stop reason: HOSPADM

## 2024-12-05 RX ORDER — OXYCODONE HYDROCHLORIDE 5 MG/1
5 TABLET ORAL EVERY 4 HOURS PRN
Start: 2024-12-05 | End: 2024-12-08

## 2024-12-05 RX ORDER — METHOCARBAMOL 500 MG/1
500 TABLET, FILM COATED ORAL NIGHTLY PRN
Status: DISCONTINUED | OUTPATIENT
Start: 2024-12-05 | End: 2024-12-07 | Stop reason: HOSPADM

## 2024-12-05 RX ORDER — METHOCARBAMOL 500 MG/1
500 TABLET, FILM COATED ORAL NIGHTLY PRN
Start: 2024-12-05 | End: 2024-12-15

## 2024-12-05 ASSESSMENT — PAIN - FUNCTIONAL ASSESSMENT
PAIN_FUNCTIONAL_ASSESSMENT: 0-10
PAIN_FUNCTIONAL_ASSESSMENT: UNABLE TO SELF-REPORT
PAIN_FUNCTIONAL_ASSESSMENT: 0-10

## 2024-12-05 ASSESSMENT — COGNITIVE AND FUNCTIONAL STATUS - GENERAL
TOILETING: A LITTLE
WALKING IN HOSPITAL ROOM: A LITTLE
MOBILITY SCORE: 16
CLIMB 3 TO 5 STEPS WITH RAILING: TOTAL
MOVING FROM LYING ON BACK TO SITTING ON SIDE OF FLAT BED WITH BEDRAILS: A LITTLE
DAILY ACTIVITIY SCORE: 22
STANDING UP FROM CHAIR USING ARMS: A LITTLE
MOVING TO AND FROM BED TO CHAIR: A LITTLE
HELP NEEDED FOR BATHING: A LITTLE
TURNING FROM BACK TO SIDE WHILE IN FLAT BAD: A LITTLE

## 2024-12-05 ASSESSMENT — PAIN SCALES - GENERAL
PAINLEVEL_OUTOF10: 0 - NO PAIN
PAINLEVEL_OUTOF10: 9
PAINLEVEL_OUTOF10: 6
PAINLEVEL_OUTOF10: 4
PAINLEVEL_OUTOF10: 0 - NO PAIN
PAINLEVEL_OUTOF10: 6
PAINLEVEL_OUTOF10: 7
PAINLEVEL_OUTOF10: 4
PAINLEVEL_OUTOF10: 7

## 2024-12-05 ASSESSMENT — PAIN DESCRIPTION - LOCATION
LOCATION: LEG

## 2024-12-05 ASSESSMENT — PAIN DESCRIPTION - ORIENTATION
ORIENTATION: RIGHT

## 2024-12-05 ASSESSMENT — PAIN DESCRIPTION - DESCRIPTORS
DESCRIPTORS: ACHING;DISCOMFORT
DESCRIPTORS: ACHING

## 2024-12-05 NOTE — PROGRESS NOTES
VASCULAR SURGERY PROGRESS NOTE  Assessment/Plan   Dustin Ace is 74 y.o. male with history of emphysema, BPH, CVA hx, PAD, Afib (on Eliquis), HTN, DM who presented to the ED 11/25 with nonhealing wound of his right foot, fevers, chronic osteomyelitis. Right foot has been deemed unsalvageable. He is amenable to a R BKA.   Although febrile on arrival, vitals have stabilized, WBC count normalized on vanc/zosyn.     CTA coronaries showing multivessel disease -- per conversation with cardiology this morning, heart cath will not be needed. Will proceed with plans for amputation tomorrow in OR.    11/30: POD s/p R ankle disarticulation. Pain tolerable. Dressing changes twice daily.  12/2: R BKA formalization, TMR    Plan:  Continue heparin drip, transition to Eliquis at the time of discharge  Will remove mepilex dressing on day of discharge / tomorrow 12/6   consult for stump guard - obtained  Multimodal pain regimen  Daily labs, recommend transfusing for Hgb < 8.0    D/w attending, Dr. Kody Rashid-Merari Brown MD  General Surgery PGY-3  Vascular Surgery [id number]    Subjective   No overnight issues. WBC continues to trend up. No urinary symptoms, no shortness of breath.     Objective   Vitals:  Heart Rate:  [67-77]   Temp:  [35.8 °C (96.4 °F)-36.6 °C (97.9 °F)]   Resp:  [17-18]   BP: (126-142)/(58-83)   SpO2:  [98 %-100 %]     Exam:  Constitutional: No acute distress, resting comfortably  Neuro:  AOx3, grossly intact, some left eye ptosis at baseline  ENMT: moist mucous membranes  Head/neck: atraumatic  CV: no tachycardia  Pulm: non-labored on room air  GI: soft, non-tender, non-distended  Skin: warm and dry  Musculoskeletal: moving all extremities  Extremities: RLE BKA,  in place, Dry dressing over stump, mepilex in place, no gross drainage, erythema, or induration felt in area    Labs:  Results from last 7 days   Lab Units 12/05/24  0524 12/04/24  0603 12/03/24  1231   WBC AUTO x10*3/uL 13.1* 12.5* 11.7*    HEMOGLOBIN g/dL 10.0* 9.2* 8.7*   PLATELETS AUTO x10*3/uL 339 302 303      Results from last 7 days   Lab Units 12/05/24  0524 12/04/24  0603 12/03/24  1231   SODIUM mmol/L 139 140 138   POTASSIUM mmol/L 4.0 3.7 3.9   CHLORIDE mmol/L 103 105 106   CO2 mmol/L 25 26 22   BUN mg/dL 15 11 9   CREATININE mg/dL 0.56 0.56 0.68   GLUCOSE mg/dL 102* 142* 198*   MAGNESIUM mg/dL 1.82 2.06 2.21   PHOSPHORUS mg/dL 3.1 2.7 2.0*      Results from last 7 days   Lab Units 12/02/24  0651 11/29/24  1625 11/28/24  1616 11/28/24  1616   INR  1.4* 1.3*  --  1.1   PROTIME seconds 15.5* 14.1*  --  12.9*   APTT seconds 22* 31   < >  --     < > = values in this interval not displayed.      Results from last 7 days   Lab Units 12/05/24  0524 12/04/24  2313 12/04/24  1628   ANTI XA UNFRACTIONATED IU/mL 0.4 0.2 <0.1

## 2024-12-05 NOTE — CARE PLAN
The patient's goals for the shift include Patient pain will be controled during shift    The clinical goals for the shift include patient remains free from injury this shift

## 2024-12-05 NOTE — PROGRESS NOTES
Subjective   NAEO. Patient seen at bedside this morning. Pain is under control. Having bowel movements. No bleeding noted although leg is wrapped. Denies fevers/chills, chest pain, shortness of breath.         Objective     Last Recorded Vitals  /83   Pulse 77   Temp 36.6 °C (97.9 °F)   Resp 17   Wt 90.7 kg (199 lb 15.3 oz)   SpO2 100%   Intake/Output last 3 Shifts:    Intake/Output Summary (Last 24 hours) at 12/5/2024 0655  Last data filed at 12/5/2024 0500  Gross per 24 hour   Intake 709.38 ml   Output 2875 ml   Net -2165.62 ml       Admission Weight  Weight: 90.7 kg (200 lb) (11/25/24 1123)    Daily Weight  11/29/24 : 90.7 kg (199 lb 15.3 oz)    Image Results  Bedside Midline Imaging  These images are not reportable by radiology and will not be interpreted   by  Radiologists.      Physical Exam  Constitutional:       General: He is not in acute distress.     Appearance: He is not ill-appearing.   HENT:      Head: Normocephalic and atraumatic.      Mouth/Throat:      Mouth: Mucous membranes are moist.   Eyes:      Extraocular Movements: Extraocular movements intact.      Pupils: Pupils are equal, round, and reactive to light.      Comments: Prosthetic left eye   Cardiovascular:      Rate and Rhythm: Normal rate and regular rhythm.      Heart sounds: Normal heart sounds.   Pulmonary:      Effort: Pulmonary effort is normal.      Breath sounds: Normal breath sounds.   Abdominal:      General: Abdomen is flat.      Palpations: Abdomen is soft.   Musculoskeletal:      Left lower leg: No edema.      Comments: R BKA wrapped with stump guard in place   Feet:      Comments: Left hallux amputation with dry skin. Appears intact. Noted callus under the first MTP.     Neurological:      Mental Status: He is alert and oriented to person, place, and time.       Relevant Results               Assessment/Plan      Assessment & Plan  Osteomyelitis of right foot, unspecified type (Multi)    HTN  (hypertension)    Paroxysmal atrial fibrillation (Multi)    JOSE (obstructive sleep apnea)    CVA (cerebral vascular accident) (Multi)    Pulmonary emphysema (Multi)    Diabetes mellitus, type 2 (Multi)    Hyperlipidemia    CAD (coronary artery disease)    Hepatitis C    Chronic wound    Dustin Ace is a 74 y.o. male with a history of BPH, centrilobular emphysema, chronic hepatitis C (treated), CVA, diabetes mellitus, hypertension, PAD, history of Charcot deformity, chronic right foot osteomyelitis, paroxysmal atrial fibrillation presenting for a chronic R foot wound. Patient met SIRS criteria on admission. Started on vanc and zosyn and got 500mL bolus, vitals now improved. MRI confirmed R foot OM. To undergo R BKA with vascular surgery.    Updates 12/5:  -Leukocytosis slightly increased today, likely hemoconcentrated  -Continue heparin drip today, then switch to home eliquis  -Vascular surgery will unwrap and evaluate wound before discharge  -Restarted home amlodipine 5mg  -Dispo to SNF pending acceptance    #Chronic infected right leg wound s/p multiple I&D's  #SIRS secondary to infected wound (resolved)  #Osteomyelitis  #Peripheral vascular disease  #Charcot deformity and neuropathy  #S/p R BKA  - CRP: 16.25, ESR: 96  - SIRS criteria with source of infection, though no signs of end-organ damage currently, vitals improved with vanc, zosyn, and IVF bolus  - MRI showed OM in R foot  - Patient seen by podiatry, deemed R foot unsalvageable and recommended BKA  - ARMANDO showed mild disease in RLE, none in LLE    Cultures:  BCx (11/25): NGTD  Wound cultures (11/25): Pseudomonas and Strep gordonii     Antibiotics:  Vancomycin (11/25 - 12/3)  Zosyn (11/25 - 12/3)     Plan:  -R BKA with vascular surgery 11/29  -Formalization of R BKA on 12/2  -Stopped vancomycin and Zosyn   -Continue heparin drip today, then switch to home eliquis  -Vascular surgery will unwrap and evaluate wound before discharge  -Patient will go to SNF once  medically ready       #Diabetes mellitus  - A1C 5.6 on 11/25  Plan:  - POC glucose checks, will add sliding scale insulin if needed     #HTN  - Restarted home amlodipine     #Paroxysmal Afib  - Hold apixaban for possible procedure/surgical intervention.   -Continue heparin drip today, restart apixaban at discharge    #HLD  -continue home atorvastatin     #Anemia of chronic disease  - Baseline of approximately 12-13  - Presented at 9.0. Normocytic with elevated RDW  - Iron 25 (low), TIBC (low), UIBC 165, 13% sat, ferritin of 1,087, suggestive of anemia of chronic disease  - Retic absolute: 0.086, retic %: 2.7%. RPI <2, indicates inadequate response  Plan:   - Treat underlying infection     #Insomnia  #Anxiety  - Patient reports taking it approximately once per month  - Hold diazepam. Will give anxiolytic if needed     #BPH  - Continue tamsulosin     #MISC  - Continue B-complex with vitamin C  - Continue vitamin D  - Continue Maalox     F: PRN  E: PRN  N: regular diet  A: PIV  Bowel regimen: Miralax     DVT ppx: Holding home apixaban, heparin drip   GI ppx: None    Code Status: Full code (confirmed on admission)  Surrogate Medical Decision-maker: Coco Finley (daughter): [phone]; Yanet Sandip (son): [phone]

## 2024-12-05 NOTE — PROGRESS NOTES
12/5/24 2993 Transitional Care Coordinator Notes:    Transitional Care Coordination Progress Note:  Patient discussed during interdisciplinary rounds.   Team members present: MD and TCC  Plan per Medical/Surgical team: Three liaison's from three nursing facilities will meet with the patient tomorrow to discuss facility choices. ([facility], [facility]).  Payor: [employer]  Discharge disposition: SNF  Potential Barriers: none  ADOD: 1-3 days                        Assessment/Plan   Assessment & Plan  Osteomyelitis of right foot, unspecified type (Multi)    HTN (hypertension)    Paroxysmal atrial fibrillation (Multi)    JOSE (obstructive sleep apnea)    CVA (cerebral vascular accident) (Multi)    Pulmonary emphysema (Multi)    Diabetes mellitus, type 2 (Multi)    Hyperlipidemia    CAD (coronary artery disease)    Hepatitis C    Chronic wound               Kristina Paul, RN

## 2024-12-05 NOTE — PROGRESS NOTES
Physical Therapy    Physical Therapy Treatment    Patient Name: Dustin Ace  MRN: [id number]  Department: [place] [address]  Room: 60/6010-A  Today's Date: 12/5/2024  Time Calculation  Start Time: 0929  Stop Time: 1009  Time Calculation (min): 40 min         Assessment/Plan   PT Assessment  PT Assessment Results: Decreased strength, Impaired balance, Orthopedic restrictions  Assessment Comment: Pt able to progress with ambulation this date, demos decreased strength and balance currently post-op BKA, will continue to benefit from skilled PT to progress with overall mobility, transfers and gait.  End of Session Patient Position: Bed, 3 rail up, Alarm off, not on at start of session     PT Plan  Treatment/Interventions: Bed mobility, Transfer training, Gait training, Balance training, Therapeutic exercise, Therapeutic activity  PT Plan: Ongoing PT  PT Frequency: 4 times per week  PT Discharge Recommendations: Moderate intensity level of continued care  Equipment Recommended upon Discharge:  (none)  PT Recommended Transfer Status: Assist x1, Assistive device  PT - OK to Discharge: Yes (meaning pt seen and dc rec made)      General Visit Information:   PT  Visit  PT Received On: 12/05/24  General  General Comment: Pt agreeable to participate, limb protector in place.  Shows this PT a page out of informational booklet that prosthetics gave him that he feels will help explain to his son what he is feeling currently as reports though he can be stoic, is feeling many emotions in regards to surgery.  Mobility-wise is continues to progress well, able to ambulate into hallway with min<-> CGA.  Completes therex and multiple sit<->stand transfers.  Will continue to follow.    Subjective   Precautions:  Precautions  LE Weight Bearing Status: Right Non-Weight Bearing  Medical Precautions: Fall precautions    Objective   Pain:  Pain Assessment  Pain Assessment: 0-10  0-10 (Numeric) Pain Score: 4  Pain Location: Leg  Pain Orientation:  Right  Cognition:  Cognition  Arousal/Alertness: Appropriate responses to stimuli  Orientation Level: Disoriented X4  Following Commands: Follows all commands and directions without difficulty     Postural Control:  Static Sitting Balance  Static Sitting-Balance Support: Bilateral upper extremity supported ((L) foot supported)  Static Sitting-Level of Assistance: Distant supervision  Static Standing Balance  Static Standing-Balance Support: Bilateral upper extremity supported  Static Standing-Level of Assistance: Contact guard  Dynamic Standing Balance  Dynamic Standing-Balance Support: Bilateral upper extremity supported  Dynamic Standing-Level of Assistance: Minimum assistance, Contact guard  Dynamic Standing-Comments: standard walker    Activity Tolerance:  Activity Tolerance  Endurance: Tolerates 30 min exercise with multiple rests  Treatments:  Therapeutic Exercise  Therapeutic Exercise Performed: Yes  Therapeutic Exercise Activity 1: (L) LE: LAQ x 20 reps, cues for slow controlled motion  Therapeutic Exercise Activity 2: (R) LE SLR x 10, cues to only raise to (L) knee height with controlled motion.  Therapeutic Exercise Activity 3: (R) LE hip abd/add x 10 reps    Therapeutic Activity  Therapeutic Activity Performed: Yes  Therapeutic Activity 1: Completed 5xSTS in 40 seconds, did use 1-2 hands for safety, completed with close (S)<-> CGA for safety with bed height slightly elevated.    Bed Mobility  Bed Mobility: Yes  Bed Mobility 1  Bed Mobility 1: Supine to sitting  Level of Assistance 1: Close supervision  Bed Mobility 2  Bed Mobility  2: Sitting to supine  Level of Assistance 2: Close supervision    Ambulation/Gait Training  Ambulation/Gait Training Performed: Yes  Ambulation/Gait Training 1  Surface 1: Level tile  Device 1: Standard walker  Assistance 1: Minimum assistance, Contact guard, Minimal verbal cues  Quality of Gait 1:  (variable hop length with (L) LE, slightly unsteady at times with changes of  direction/use of standard walker though pt reports he prefers using over whw.  Overall slow pace.)  Comments/Distance (ft) 1: 20 ft (short standing rest break in long half way through)  Transfers  Transfer: Yes  Transfer 1  Transfer From 1: Bed to  Transfer to 1: Stand  Transfer Device 1: Walker (bed height elevated)  Transfer Level of Assistance 1: Contact guard, Close supervision, Minimal verbal cues  Trials/Comments 1: completes x 5 during session  Transfers 2  Transfer From 2: Stand to  Transfer to 2: Bed  Transfer Level of Assistance 2: Contact guard, Close supervision, Minimal verbal cues  Trials/Comments 2: completes x 5 during session, mildly uncontrolled when does not reach back with at least one hand    Outcome Measures:  [facility] Basic Mobility  Turning from your back to your side while in a flat bed without using bedrails: A little  Moving from lying on your back to sitting on the side of a flat bed without using bedrails: A little  Moving to and from bed to chair (including a wheelchair): A little  Standing up from a chair using your arms (e.g. wheelchair or bedside chair): A little  To walk in hospital room: A little  Climbing 3-5 steps with railing: Total  Basic Mobility - Total Score: 16    Education Documentation  Mobility Training, taught by Xochilt Robb, PT at 12/5/2024 10:57 AM.  Learner: Patient  Readiness: Acceptance  Method: Explanation  Response: Verbalizes Understanding, Demonstrated Understanding  Comment: use of walker, PT progression         Encounter Problems       Encounter Problems (Active)       PT Problem       Patient will ambulate >60' with LRAD and Supervision  (Progressing)       Start:  12/01/24    Expected End:  12/16/24            Patient will perform sit<>stand transfer with LRAD, and Supervision  (Progressing)       Start:  12/01/24    Expected End:  12/16/24            Patient will complete supine to sit and sit to supine Independent  (Progressing)       Start:  12/01/24     Expected End:  12/16/24            Pt will demo static/dynamic standing with LRAD and supervison x 2 min with 0 LOB (Progressing)       Start:  12/01/24    Expected End:  12/16/24 12/05/24 at 10:58 AM - Xochilt Robb, PT      1 person assist

## 2024-12-06 LAB
ABO GROUP (TYPE) IN BLOOD: NORMAL
ANTIBODY SCREEN: NORMAL
ERYTHROCYTE [DISTWIDTH] IN BLOOD BY AUTOMATED COUNT: 18 % (ref 11.5–14.5)
GLUCOSE BLD MANUAL STRIP-MCNC: 104 MG/DL (ref 74–99)
GLUCOSE BLD MANUAL STRIP-MCNC: 136 MG/DL (ref 74–99)
GLUCOSE BLD MANUAL STRIP-MCNC: 168 MG/DL (ref 74–99)
HCT VFR BLD AUTO: 32.1 % (ref 41–52)
HGB BLD-MCNC: 10 G/DL (ref 13.5–17.5)
MCH RBC QN AUTO: 27.9 PG (ref 26–34)
MCHC RBC AUTO-ENTMCNC: 31.2 G/DL (ref 32–36)
MCV RBC AUTO: 90 FL (ref 80–100)
NRBC BLD-RTO: 0 /100 WBCS (ref 0–0)
PLATELET # BLD AUTO: 348 X10*3/UL (ref 150–450)
RBC # BLD AUTO: 3.58 X10*6/UL (ref 4.5–5.9)
RH FACTOR (ANTIGEN D): NORMAL
UFH PPP CHRO-ACNC: 0.3 IU/ML
WBC # BLD AUTO: 11.1 X10*3/UL (ref 4.4–11.3)

## 2024-12-06 PROCEDURE — 2500000001 HC RX 250 WO HCPCS SELF ADMINISTERED DRUGS (ALT 637 FOR MEDICARE OP)

## 2024-12-06 PROCEDURE — 2500000002 HC RX 250 W HCPCS SELF ADMINISTERED DRUGS (ALT 637 FOR MEDICARE OP, ALT 636 FOR OP/ED)

## 2024-12-06 PROCEDURE — 1200000002 HC GENERAL ROOM WITH TELEMETRY DAILY

## 2024-12-06 PROCEDURE — 82947 ASSAY GLUCOSE BLOOD QUANT: CPT

## 2024-12-06 PROCEDURE — 85027 COMPLETE CBC AUTOMATED: CPT | Performed by: STUDENT IN AN ORGANIZED HEALTH CARE EDUCATION/TRAINING PROGRAM

## 2024-12-06 PROCEDURE — 99232 SBSQ HOSP IP/OBS MODERATE 35: CPT

## 2024-12-06 PROCEDURE — 97116 GAIT TRAINING THERAPY: CPT | Mod: GP | Performed by: PHYSICAL THERAPIST

## 2024-12-06 PROCEDURE — 86901 BLOOD TYPING SEROLOGIC RH(D): CPT

## 2024-12-06 PROCEDURE — 85520 HEPARIN ASSAY: CPT | Performed by: STUDENT IN AN ORGANIZED HEALTH CARE EDUCATION/TRAINING PROGRAM

## 2024-12-06 PROCEDURE — 2500000004 HC RX 250 GENERAL PHARMACY W/ HCPCS (ALT 636 FOR OP/ED)

## 2024-12-06 PROCEDURE — 36415 COLL VENOUS BLD VENIPUNCTURE: CPT

## 2024-12-06 RX ORDER — SENNOSIDES 8.6 MG/1
1 TABLET ORAL NIGHTLY
Status: DISCONTINUED | OUTPATIENT
Start: 2024-12-06 | End: 2024-12-07 | Stop reason: HOSPADM

## 2024-12-06 RX ORDER — ACETAMINOPHEN 325 MG/1
975 TABLET ORAL EVERY 8 HOURS PRN
Start: 2024-12-06

## 2024-12-06 ASSESSMENT — COGNITIVE AND FUNCTIONAL STATUS - GENERAL
TOILETING: A LITTLE
STANDING UP FROM CHAIR USING ARMS: A LITTLE
STANDING UP FROM CHAIR USING ARMS: A LITTLE
TURNING FROM BACK TO SIDE WHILE IN FLAT BAD: A LITTLE
DAILY ACTIVITIY SCORE: 22
MOVING TO AND FROM BED TO CHAIR: A LITTLE
CLIMB 3 TO 5 STEPS WITH RAILING: TOTAL
TOILETING: A LITTLE
HELP NEEDED FOR BATHING: A LITTLE
MOBILITY SCORE: 17
MOVING TO AND FROM BED TO CHAIR: A LITTLE
WALKING IN HOSPITAL ROOM: A LITTLE
CLIMB 3 TO 5 STEPS WITH RAILING: TOTAL
WALKING IN HOSPITAL ROOM: A LITTLE
STANDING UP FROM CHAIR USING ARMS: A LITTLE
MOVING FROM LYING ON BACK TO SITTING ON SIDE OF FLAT BED WITH BEDRAILS: A LITTLE
WALKING IN HOSPITAL ROOM: A LITTLE
MOBILITY SCORE: 19
DAILY ACTIVITIY SCORE: 23
MOBILITY SCORE: 18
CLIMB 3 TO 5 STEPS WITH RAILING: A LOT

## 2024-12-06 ASSESSMENT — PAIN DESCRIPTION - DESCRIPTORS: DESCRIPTORS: ACHING

## 2024-12-06 ASSESSMENT — PAIN SCALES - GENERAL
PAINLEVEL_OUTOF10: 7
PAINLEVEL_OUTOF10: 6
PAINLEVEL_OUTOF10: 7
PAINLEVEL_OUTOF10: 8
PAINLEVEL_OUTOF10: 4
PAINLEVEL_OUTOF10: 6
PAINLEVEL_OUTOF10: 0 - NO PAIN

## 2024-12-06 ASSESSMENT — PAIN - FUNCTIONAL ASSESSMENT
PAIN_FUNCTIONAL_ASSESSMENT: 0-10
PAIN_FUNCTIONAL_ASSESSMENT: 0-10

## 2024-12-06 NOTE — PROGRESS NOTES
VASCULAR SURGERY PROGRESS NOTE  Assessment/Plan   Dustin Ace is 74 y.o. male with history of emphysema, BPH, CVA hx, PAD, Afib (on Eliquis), HTN, DM who presented to the ED 11/25 with nonhealing wound of his right foot, fevers, chronic osteomyelitis. Right foot has been deemed unsalvageable. He is amenable to a R BKA.   Although febrile on arrival, vitals have stabilized, WBC count normalized on vanc/zosyn.     CTA coronaries showing multivessel disease -- per conversation with cardiology this morning, heart cath will not be needed. Will proceed with plans for amputation tomorrow in OR.    11/30: POD s/p R ankle disarticulation. Pain tolerable. Dressing changes twice daily.  12/2: R BKA formalization, TMR    Plan:  Okay for eliquis  Mepilex removed, no concern for wound infection  If any bleeding/drainage, okay to cover with gauze and tape. Otherwise okay to leave open to air.   consult for stump guard - obtained  Okay for discharge from vascular surgery standpoint    D/w attending, Dr. Kody Rashid-Merari Brown MD  General Surgery PGY-3  Vascular Surgery [id number]    Subjective   No overnight issues.     Objective   Vitals:  Heart Rate:  [70-80]   Temp:  [36.1 °C (97 °F)-36.6 °C (97.9 °F)]   Resp:  [14-18]   BP: (107-171)/(63-90)   SpO2:  [96 %-100 %]     Exam:  Constitutional: No acute distress, resting comfortably  Neuro:  AOx3, grossly intact, some left eye ptosis at baseline  ENMT: moist mucous membranes  Head/neck: atraumatic  CV: no tachycardia  Pulm: non-labored on room air  GI: soft, non-tender, non-distended  Skin: warm and dry  Musculoskeletal: moving all extremities  Extremities: RLE BKA,  in place, dressing removed with incision c/d/I without signs of infection    Labs:  Results from last 7 days   Lab Units 12/06/24  0534 12/05/24  0524 12/04/24  0603   WBC AUTO x10*3/uL 11.1 13.1* 12.5*   HEMOGLOBIN g/dL 10.0* 10.0* 9.2*   PLATELETS AUTO x10*3/uL 348 339 302      Results from last 7 days    Lab Units 12/05/24  0524 12/04/24  0603 12/03/24  1231   SODIUM mmol/L 139 140 138   POTASSIUM mmol/L 4.0 3.7 3.9   CHLORIDE mmol/L 103 105 106   CO2 mmol/L 25 26 22   BUN mg/dL 15 11 9   CREATININE mg/dL 0.56 0.56 0.68   GLUCOSE mg/dL 102* 142* 198*   MAGNESIUM mg/dL 1.82 2.06 2.21   PHOSPHORUS mg/dL 3.1 2.7 2.0*      Results from last 7 days   Lab Units 12/02/24  0651 11/29/24  1625   INR  1.4* 1.3*   PROTIME seconds 15.5* 14.1*   APTT seconds 22* 31      Results from last 7 days   Lab Units 12/06/24  0534 12/05/24  1114 12/05/24  0524   ANTI XA UNFRACTIONATED IU/mL 0.3 0.3 0.4

## 2024-12-06 NOTE — CARE PLAN
The patient's goals for the shift include Patient pain will be controled during shift    The clinical goals for the shift include Pt safety will be maintained      Problem: Fall/Injury  Goal: Be free from injury by end of the shift  Outcome: Progressing     Problem: Pain  Goal: Turns in bed with improved pain control throughout the shift  Outcome: Progressing     Problem: Skin  Goal: Prevent/minimize sheer/friction injuries  Outcome: Progressing  Flowsheets (Taken 12/6/2024 0331)  Prevent/minimize sheer/friction injuries: HOB 30 degrees or less     Problem: Pain - Adult  Goal: Verbalizes/displays adequate comfort level or baseline comfort level  Outcome: Progressing  Flowsheets (Taken 12/6/2024 0331)  Verbalizes/displays adequate comfort level or baseline comfort level:   Encourage patient to monitor pain and request assistance   Assess pain using appropriate pain scale   Administer analgesics based on type and severity of pain and evaluate response     Problem: Diabetes  Goal: Increase stability of blood glucose readings by end of shift  Outcome: Progressing

## 2024-12-06 NOTE — PROGRESS NOTES
Physical Therapy    Physical Therapy Treatment    Patient Name: Dustin Ace  MRN: [id number]  Department: [place] [address]  Room: [facility]60-  Today's Date: 12/6/2024  Time Calculation  Start Time: 1133  Stop Time: 1152  Time Calculation (min): 19 min         Assessment/Plan   PT Assessment  PT Assessment Results: Decreased strength, Impaired balance, Orthopedic restrictions  Rehab Prognosis: Good  Barriers to Discharge: none  Evaluation/Treatment Tolerance: Patient tolerated treatment well  Medical Staff Made Aware: Yes  Strengths: Attitude of self  Barriers to Participation: Comorbidities  End of Session Communication: Bedside nurse  Assessment Comment: pt increase ambulation and able to transfer wtih decrease assist. pt continues to be appropriate for services.  End of Session Patient Position: Bed, 3 rail up, Alarm off, not on at start of session  PT Plan  Inpatient/Swing Bed or Outpatient: Inpatient  PT Plan  Treatment/Interventions: Bed mobility, Transfer training, Gait training, Stair training, Balance training, Strengthening, Endurance training, Range of motion, Therapeutic exercise, Therapeutic activity, Home exercise program, Orthotic fitting/training  PT Plan: Ongoing PT  PT Frequency: 4 times per week  PT Discharge Recommendations: Moderate intensity level of continued care  Equipment Recommended upon Discharge:  (none)  PT Recommended Transfer Status: Assist x1  PT - OK to Discharge: Yes      General Visit Information:   PT  Visit  PT Received On: 12/06/24  General  Reason for Referral: s/p (R) BKA formalization, TMR 12/2/2024  Past Medical History Relevant to Rehab: 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 emphysema, CVA hx, PAD; Afib,  HTN, DM, (L) eye prosthetic  Family/Caregiver Present: No  Prior to Session Communication: Bedside nurse  Patient Position Received: Bed, 3 rail up, Alarm off, not on at start of session  Preferred Learning Style: auditory, verbal  General Comment: pt agreeable to participate. very  disappointed that he can not go to [facility] upon DC. pt to pick an alternate facility.    Subjective   Precautions:  Precautions  Hearing/Visual Limitations: (L) eye prosthetic  LE Weight Bearing Status: Right Non-Weight Bearing  Medical Precautions: Fall precautions  Braces Applied: R/STUMP GUARD DONNED  Precautions Comment: R ankle disarticulation (no BP in R UE)    Vital Signs (Past 2hrs)                 Objective   Pain:  Pain Assessment  Pain Assessment: 0-10  0-10 (Numeric) Pain Score: 8  Pain Type: Surgical pain  Pain Location: Leg  Pain Orientation: Right  Pain Descriptors: Aching  Pain Frequency: Intermittent  Pain Onset: Ongoing  Clinical Progression: Gradually improving  Pain Interventions: Repositioned  Cognition:  Cognition  Orientation Level: Oriented X4  Following Commands: Follows all commands and directions without difficulty  Coordination:  Movements are Fluid and Coordinated: Yes  Postural Control:  Postural Control  Postural Control: Within Functional Limits  Static Sitting Balance  Static Sitting-Balance Support: Bilateral upper extremity supported  Static Sitting-Level of Assistance: Distant supervision  Static Standing Balance  Static Standing-Balance Support: Bilateral upper extremity supported  Static Standing-Level of Assistance: Contact guard  Static Standing-Comment/Number of Minutes: use of ww  Dynamic Standing Balance  Dynamic Standing-Balance Support: Bilateral upper extremity supported  Dynamic Standing-Level of Assistance: Contact guard  Dynamic Standing-Comments: sw    Activity Tolerance:  Activity Tolerance  Endurance: Tolerates 10 - 20 min exercise with multiple rests  Treatments:  Therapeutic Exercise  Therapeutic Exercise Performed: Yes  Therapeutic Exercise Activity 1: (L) LE: LAQ x 20 reps, cues for slow controlled motion  Therapeutic Exercise Activity 2: Supine: SLR, hip abd/add 1 x 10 each B         Bed Mobility  Bed Mobility: Yes  Bed Mobility 1  Bed Mobility 1: Supine to  sitting  Level of Assistance 1: Close supervision  Bed Mobility Comments 1: HOB elevated    Ambulation/Gait Training  Ambulation/Gait Training Performed: Yes  Ambulation/Gait Training 1  Surface 1: Level tile  Device 1: Standard walker  Assistance 1: Contact guard  Quality of Gait 1: Wide base of support, Inconsistent stride length, Decreased step length, [place] (variable hop length with (L) LE, pt with some unsteadiness with turning but without LOB)  Comments/Distance (ft) 1: 30 ft  Transfers  Transfer: Yes  Transfer 1  Transfer From 1: Sit to, Stand to  Transfer to 1: Stand, Sit  Technique 1: Sit to stand, Stand to sit  Transfer Device 1: Walker  Transfer Level of Assistance 1: Contact guard  Trials/Comments 1: 3 trials  Transfers 2  Transfer From 2: Bed to  Transfer to 2: Chair with arms  Transfer Device 2: Walker  Transfer Level of Assistance 2: Contact guard, Close supervision, Minimal verbal cues         Outcome Measures:  [facility] Basic Mobility  Turning from your back to your side while in a flat bed without using bedrails: A little  Moving from lying on your back to sitting on the side of a flat bed without using bedrails: A little  Moving to and from bed to chair (including a wheelchair): A little  Standing up from a chair using your arms (e.g. wheelchair or bedside chair): A little  To walk in hospital room: A little  Climbing 3-5 steps with railing: A lot  Basic Mobility - Total Score: 17    Education Documentation  Precautions, taught by Tram Echavarria, PT at 12/6/2024  1:32 PM.  Learner: Patient  Readiness: Acceptance  Method: Explanation  Response: Verbalizes Understanding    Body Mechanics, taught by Tram Echavarria PT at 12/6/2024  1:32 PM.  Learner: Patient  Readiness: Acceptance  Method: Explanation  Response: Verbalizes Understanding    Mobility Training, taught by Tram Echavarria PT at 12/6/2024  1:32 PM.  Learner: Patient  Readiness: Acceptance  Method:  Explanation  Response: Verbalizes Understanding    Education Comments  No comments found.        OP EDUCATION:       Encounter Problems       Encounter Problems (Active)       PT Problem       Patient will ambulate >60' with LRAD and Supervision  (Progressing)       Start:  12/01/24    Expected End:  12/16/24            Patient will perform sit<>stand transfer with LRAD, and Supervision  (Progressing)       Start:  12/01/24    Expected End:  12/16/24            Patient will complete supine to sit and sit to supine Independent  (Progressing)       Start:  12/01/24    Expected End:  12/16/24            Pt will demo static/dynamic standing with LRAD and supervison x 2 min with 0 LOB (Progressing)       Start:  12/01/24    Expected End:  12/16/24               Pain - Adult

## 2024-12-06 NOTE — PROGRESS NOTES
12/06/24 1657   Discharge Planning   Home or Post Acute Services Post acute facilities (Rehab/SNF/etc)  ([facility])   Type of Post Acute Facility Services Skilled nursing   Expected Discharge Disposition SNF   Does the patient need discharge transport arranged? Yes   RoundTrip coordination needed? Yes   Has discharge transport been arranged? Yes   What day is the transport expected? 12/06/24   What time is the transport expected? 1000     Patient will discharge to [facility]. 7000 completed and uploaded into [facility]. Final goldenrod and AVS sent to facility. Transportation is arranged for 12/7 at 10:00 am via [facility]. Nurse given number for report.    Number for report: [phone]

## 2024-12-06 NOTE — PROGRESS NOTES
Subjective   NAEO. Patient seen at bedside this morning. Pain is under control. Having bowel movements. No bleeding noted although leg is wrapped. Denies fevers/chills, chest pain, shortness of breath.         Objective     Last Recorded Vitals  /68   Pulse 70   Temp 36.1 °C (97 °F)   Resp 17   Wt 90.7 kg (199 lb 15.3 oz)   SpO2 100%   Intake/Output last 3 Shifts:    Intake/Output Summary (Last 24 hours) at 12/6/2024 0644  Last data filed at 12/6/2024 0600  Gross per 24 hour   Intake 520 ml   Output 1210 ml   Net -690 ml       Admission Weight  Weight: 90.7 kg (200 lb) (11/25/24 1123)    Daily Weight  11/29/24 : 90.7 kg (199 lb 15.3 oz)    Image Results  Bedside Midline Imaging  These images are not reportable by radiology and will not be interpreted   by  Radiologists.      Physical Exam  Constitutional:       General: He is not in acute distress.     Appearance: He is not ill-appearing.   HENT:      Head: Normocephalic and atraumatic.      Mouth/Throat:      Mouth: Mucous membranes are moist.   Eyes:      Extraocular Movements: Extraocular movements intact.      Pupils: Pupils are equal, round, and reactive to light.      Comments: Prosthetic left eye   Cardiovascular:      Rate and Rhythm: Normal rate and regular rhythm.      Heart sounds: Normal heart sounds.   Pulmonary:      Effort: Pulmonary effort is normal.      Breath sounds: Normal breath sounds.   Abdominal:      General: Abdomen is flat.      Palpations: Abdomen is soft.   Musculoskeletal:      Left lower leg: No edema.      Comments: R BKA wrapped with stump guard in place   Feet:      Comments: Left hallux amputation with dry skin. Appears intact. Noted callus under the first MTP.     Neurological:      Mental Status: He is alert and oriented to person, place, and time.       Relevant Results               Assessment/Plan      Assessment & Plan  Osteomyelitis of right foot, unspecified type (Multi)    HTN (hypertension)    Paroxysmal  atrial fibrillation (Multi)    JOSE (obstructive sleep apnea)    CVA (cerebral vascular accident) (Multi)    Pulmonary emphysema (Multi)    Diabetes mellitus, type 2 (Multi)    Hyperlipidemia    CAD (coronary artery disease)    Hepatitis C    Chronic wound    Dustin Ace is a 74 y.o. male with a history of BPH, centrilobular emphysema, chronic hepatitis C (treated), CVA, diabetes mellitus, hypertension, PAD, history of Charcot deformity, chronic right foot osteomyelitis, paroxysmal atrial fibrillation presenting for a chronic R foot wound. Patient met SIRS criteria on admission. Started on vanc and zosyn and got 500mL bolus, vitals now improved. MRI confirmed R foot OM. To undergo R BKA with vascular surgery.    Updates 12/6:  -Awaiting placement in SNF  -Leukocytosis downtrending, was likely secondary to surgery  -Will continue heparin drip and switch to home eliquis at discharge    #Chronic infected right leg wound s/p multiple I&D's  #SIRS secondary to infected wound (resolved)  #Osteomyelitis  #Peripheral vascular disease  #Charcot deformity and neuropathy  #S/p R BKA  - CRP: 16.25, ESR: 96  - SIRS criteria with source of infection, though no signs of end-organ damage currently, vitals improved with vanc, zosyn, and IVF bolus  - MRI showed OM in R foot  - Patient seen by podiatry, deemed R foot unsalvageable and recommended BKA  - ARMANDO showed mild disease in RLE, none in LLE    Cultures:  BCx (11/25): NGTD  Wound cultures (11/25): Pseudomonas and Strep gordonii     Antibiotics:  Vancomycin (11/25 - 12/3)  Zosyn (11/25 - 12/3)     Plan:  -R BKA with vascular surgery 11/29  -Formalization of R BKA on 12/2  -Stopped vancomycin and Zosyn   -Continue heparin drip, switch to home eliquis upon discharge  -Vascular surgery will unwrap and evaluate wound before discharge  -Patient will go to SNF once medically ready       #Diabetes mellitus  - A1C 5.6 on 11/25  Plan:  - POC glucose checks, will add sliding scale insulin  if needed     #HTN  - Restarted home amlodipine     #Paroxysmal Afib  - Hold apixaban for possible procedure/surgical intervention.   -Continue heparin drip, restart apixaban at discharge    #HLD  -continue home atorvastatin     #Anemia of chronic disease  - Baseline of approximately 12-13  - Presented at 9.0. Normocytic with elevated RDW  - Iron 25 (low), TIBC (low), UIBC 165, 13% sat, ferritin of 1,087, suggestive of anemia of chronic disease  - Retic absolute: 0.086, retic %: 2.7%. RPI <2, indicates inadequate response  Plan:   - Treat underlying infection     #Insomnia  #Anxiety  - Patient reports taking it approximately once per month  - Hold diazepam. Will give anxiolytic if needed     #BPH  - Continue tamsulosin     #MISC  - Continue B-complex with vitamin C  - Continue vitamin D  - Continue Maalox     F: PRN  E: PRN  N: regular diet  A: PIV  Bowel regimen: Miralax     DVT ppx: Holding home apixaban, heparin drip   GI ppx: None    Code Status: Full code (confirmed on admission)  Surrogate Medical Decision-maker: Coco Finley (daughter): [phone]; Yanet Oropeza (son): [phone]

## 2024-12-06 NOTE — PROGRESS NOTES
12/6/24 1229 Transitional Care Coordinator Notes:    Transitional Care Coordination Progress Note:  Patient discussed during interdisciplinary rounds.   Team members present: MD and TCC  Plan per Medical/Surgical team: Met with patient and he has chosen [facility] the [facility]. Messaged the direct submit team to initiate precert.  Payor: [employer]  Discharge disposition: SNF  Potential Barriers: none   ADOD:  1-3 days                       Assessment/Plan   Assessment & Plan  Osteomyelitis of right foot, unspecified type (Multi)    HTN (hypertension)    Paroxysmal atrial fibrillation (Multi)    JOSE (obstructive sleep apnea)    CVA (cerebral vascular accident) (Multi)    Pulmonary emphysema (Multi)    Diabetes mellitus, type 2 (Multi)    Hyperlipidemia    CAD (coronary artery disease)    Hepatitis C    Chronic wound               Kristina Paul RN

## 2024-12-07 ENCOUNTER — DOCUMENTATION (OUTPATIENT)
Dept: INPATIENT UNIT | Facility: HOSPITAL | Age: 74
End: 2024-12-07
Payer: MEDICARE

## 2024-12-07 VITALS
TEMPERATURE: 97.7 F | HEIGHT: 75 IN | SYSTOLIC BLOOD PRESSURE: 161 MMHG | BODY MASS INDEX: 24.86 KG/M2 | DIASTOLIC BLOOD PRESSURE: 93 MMHG | RESPIRATION RATE: 17 BRPM | WEIGHT: 199.96 LBS | OXYGEN SATURATION: 96 % | HEART RATE: 69 BPM

## 2024-12-07 LAB
GLUCOSE BLD MANUAL STRIP-MCNC: 107 MG/DL (ref 74–99)
GLUCOSE BLD MANUAL STRIP-MCNC: 125 MG/DL (ref 74–99)
GLUCOSE BLD MANUAL STRIP-MCNC: 140 MG/DL (ref 74–99)

## 2024-12-07 PROCEDURE — 2500000001 HC RX 250 WO HCPCS SELF ADMINISTERED DRUGS (ALT 637 FOR MEDICARE OP)

## 2024-12-07 PROCEDURE — 99239 HOSP IP/OBS DSCHRG MGMT >30: CPT

## 2024-12-07 PROCEDURE — 82947 ASSAY GLUCOSE BLOOD QUANT: CPT

## 2024-12-07 PROCEDURE — 2500000004 HC RX 250 GENERAL PHARMACY W/ HCPCS (ALT 636 FOR OP/ED)

## 2024-12-07 ASSESSMENT — COGNITIVE AND FUNCTIONAL STATUS - GENERAL
WALKING IN HOSPITAL ROOM: A LITTLE
CLIMB 3 TO 5 STEPS WITH RAILING: A LOT
TOILETING: A LITTLE
MOBILITY SCORE: 19
STANDING UP FROM CHAIR USING ARMS: A LITTLE
TURNING FROM BACK TO SIDE WHILE IN FLAT BAD: A LITTLE
DAILY ACTIVITIY SCORE: 23

## 2024-12-07 ASSESSMENT — PAIN SCALES - GENERAL
PAINLEVEL_OUTOF10: 6
PAINLEVEL_OUTOF10: 6
PAINLEVEL_OUTOF10: 4

## 2024-12-07 ASSESSMENT — PAIN DESCRIPTION - DESCRIPTORS: DESCRIPTORS: ACHING

## 2024-12-07 ASSESSMENT — PAIN - FUNCTIONAL ASSESSMENT
PAIN_FUNCTIONAL_ASSESSMENT: 0-10

## 2024-12-07 NOTE — DISCHARGE SUMMARY
Discharge Diagnosis  Osteomyelitis of right foot, unspecified type (Multi)    Issues Requiring Follow-Up  None, routine follow up with PCP    Discharge Meds     Medication List      START taking these medications     acetaminophen 325 mg tablet; Commonly known as: Tylenol; Take 3 tablets   (975 mg) by mouth every 8 hours if needed for mild pain (1 - 3).   alum-mag hydroxide-simeth 200-200-20 mg/5 mL oral suspension; Commonly   known as: Mylanta; Take 30 mL by mouth every 6 hours if needed for   indigestion or heartburn.   B complex-vitamin C tablet; Take 1 tablet by mouth once daily.   benzocaine-menthol lozenge; Commonly known as: Cepastat Sore Throat;   Dissolve 1 lozenge in the mouth every 2 hours if needed for sore throat.   insulin lispro 100 unit/mL injection; Inject 0-5 Units under the skin 3   times a day before meals. Take as directed per insulin instructions.   methocarbamol 500 mg tablet; Commonly known as: Robaxin; Take 1 tablet   (500 mg) by mouth as needed at bedtime for muscle spasms for up to 10   days.   oxyCODONE 5 mg immediate release tablet; Commonly known as: Roxicodone;   Take 1 tablet (5 mg) by mouth every 4 hours if needed for severe pain (7 -   10) for up to 3 days.   polyethylene glycol 17 gram packet; Commonly known as: Glycolax,   Miralax; Take 17 g by mouth once daily.     CONTINUE taking these medications     amLODIPine 5 mg tablet; Commonly known as: Norvasc   apixaban 5 mg tablet; Commonly known as: Eliquis   atorvastatin 80 mg tablet; Commonly known as: Lipitor   cholecalciferol 400 unit capsule; Commonly known as: Vitamin D-3   cyanocobalamin 1,000 mcg tablet; Commonly known as: Vitamin B-12   diazePAM 5 mg tablet; Commonly known as: Valium; Take 1 tablet (5 mg) by   mouth once daily as needed for anxiety.   gabapentin 300 mg capsule; Commonly known as: Neurontin; Take 1 capsule   (300 mg) by mouth 3 times a day.   tamsulosin 0.4 mg 24 hr capsule; Commonly known as: Flomax     STOP  taking these medications     b complex 0.4 mg tablet       Test Results Pending At Discharge  Pending Labs       Order Current Status    Surgical Pathology Exam In process    Surgical Pathology Exam In process            Hospital Course  Dustin Ace is a 74 y.o. male with a history of BPH, centrilobular emphysema, chronic hepatitis C (treated), CVA, diabetes mellitus, hypertension, PAD, history of Charcot deformity, chronic right foot osteomyelitis, paroxysmal atrial fibrillation who presented for his chronic R foot wound on 11/25 after 1 week of fever and chills at home and purulent drainage from his foot. Upon arrival to the ED he was febrile, tachycardic, and had a mild leukocytosis so was started on vancomycin and zosyn and IVF with improvement in vital signs. MRI confirmed R foot OM. He was evaluated by podiatry who determined the right foot to be unsalvageable and recommended BKA. Vascular surgery was consulted and took the patient for R BKA on 11/29 and then formalization of the BKA on 12/2. Source control was obtained so antibiotics were stopped on 12/3. His eliquis for afib was held during his hospitalization and he was started on a heparin drip. He developed a mild leukocytosis after the formalization on 12/2, likely reactive in the setting of surgery, which subsequently downtrended. He was discharged to SNF and resumed on his home eliquis.    Pertinent Physical Exam At Time of Discharge  Physical Exam  Constitutional:       General: He is not in acute distress.     Appearance: He is not ill-appearing.   HENT:      Head: Normocephalic and atraumatic.      Mouth/Throat:      Mouth: Mucous membranes are moist.   Eyes:      Extraocular Movements: Extraocular movements intact.      Pupils: Pupils are equal, round, and reactive to light.      Comments: Prosthetic left eye   Cardiovascular:      Rate and Rhythm: Normal rate and regular rhythm.      Heart sounds: Normal heart sounds.   Pulmonary:      Effort:  Pulmonary effort is normal.      Breath sounds: Normal breath sounds.   Abdominal:      General: Abdomen is flat.      Palpations: Abdomen is soft.   Musculoskeletal:      Left lower leg: No edema.      Comments: R BKA with stump guard in place   Feet:      Comments: Left hallux amputation with dry skin. Appears intact. Noted callus under the first MTP.     Neurological:      Mental Status: He is alert and oriented to person, place, and time.         Outpatient Follow-Up  No future appointments.      Chin Daniel MD  PGY-1

## 2024-12-07 NOTE — NURSING NOTE
Nursing Discharge Note:  [facility]. Patient midline discontinue before transport. Patient belongings return to patient and patient left will all belongings including a walker and cane . Patient understand discharge. Patient blood glucose and vital take both stable. Blood glucose 140. Report given to receiving nurse at [facility] and nurse updated about care. Patient left unit via stretcher.

## 2024-12-07 NOTE — PROGRESS NOTES
VASCULAR SURGERY PROGRESS NOTE  Assessment/Plan   Dustin Ace is 74 y.o. male with history of emphysema, BPH, CVA hx, PAD, Afib (on Eliquis), HTN, DM who presented to the ED 11/25 with nonhealing wound of his right foot, fevers, chronic osteomyelitis. Right foot has been deemed unsalvageable. He is amenable to a R BKA.   Although febrile on arrival, vitals have stabilized, WBC count normalized on vanc/zosyn.     CTA coronaries showing multivessel disease -- per conversation with cardiology this morning, heart cath will not be needed. Will proceed with plans for amputation tomorrow in OR.    11/30: POD s/p R ankle disarticulation. Pain tolerable. Dressing changes twice daily.  12/2: R BKA formalization, TMR    Plan:  Okay for eliquis  If any bleeding/drainage, okay to cover with gauze and tape. Otherwise okay to leave open to air.   consult for stump guard and stump  - obtained  Okay for discharge from vascular surgery standpoint    D/w attending, Dr. Venessa Ascencio MD  Vascular Surgery Fellow    Subjective   No overnight issues. No complaints.     Objective   Vitals:  Heart Rate:  [69-76]   Temp:  [36.2 °C (97.2 °F)-36.6 °C (97.9 °F)]   Resp:  [14-18]   BP: (116-161)/(58-93)   SpO2:  [95 %-96 %]     Exam:  Constitutional: No acute distress, resting comfortably  Neuro:  AOx3, grossly intact, some left eye ptosis at baseline  ENMT: moist mucous membranes  Head/neck: atraumatic  CV: no tachycardia  Pulm: non-labored on room air  GI: soft, non-tender, non-distended  Skin: warm and dry  Musculoskeletal: moving all extremities  Extremities: RLE BKA,  in place    Labs:  Results from last 7 days   Lab Units 12/06/24  0534 12/05/24  0524 12/04/24  0603   WBC AUTO x10*3/uL 11.1 13.1* 12.5*   HEMOGLOBIN g/dL 10.0* 10.0* 9.2*   PLATELETS AUTO x10*3/uL 348 339 302      Results from last 7 days   Lab Units 12/05/24  0524 12/04/24  0603 12/03/24  1231   SODIUM mmol/L 139 140 138   POTASSIUM mmol/L  4.0 3.7 3.9   CHLORIDE mmol/L 103 105 106   CO2 mmol/L 25 26 22   BUN mg/dL 15 11 9   CREATININE mg/dL 0.56 0.56 0.68   GLUCOSE mg/dL 102* 142* 198*   MAGNESIUM mg/dL 1.82 2.06 2.21   PHOSPHORUS mg/dL 3.1 2.7 2.0*      Results from last 7 days   Lab Units 12/02/24  0651   INR  1.4*   PROTIME seconds 15.5*   APTT seconds 22*      Results from last 7 days   Lab Units 12/06/24  0534 12/05/24  1114 12/05/24  0524   ANTI XA UNFRACTIONATED IU/mL 0.3 0.3 0.4

## 2024-12-07 NOTE — CARE PLAN
The patient's goals for the shift include Patient pain will be controled during shift    The clinical goals for the shift include Patient will remain HDS till discharge today    Other goals include:  Problem: Fall/Injury  Goal: Not fall by end of shift  Outcome: Progressing  Goal: Be free from injury by end of the shift  Outcome: Progressing  Goal: Verbalize understanding of personal risk factors for fall in the hospital  Outcome: Progressing  Goal: Verbalize understanding of risk factor reduction measures to prevent injury from fall in the home  Outcome: Progressing  Goal: Use assistive devices by end of the shift  Outcome: Progressing  Goal: Pace activities to prevent fatigue by end of the shift  Outcome: Progressing     Problem: Pain  Goal: Takes deep breaths with improved pain control throughout the shift  Outcome: Progressing  Goal: Turns in bed with improved pain control throughout the shift  Outcome: Progressing  Goal: Walks with improved pain control throughout the shift  Outcome: Progressing  Goal: Performs ADL's with improved pain control throughout shift  Outcome: Progressing  Goal: Participates in PT with improved pain control throughout the shift  Outcome: Progressing  Goal: Free from opioid side effects throughout the shift  Outcome: Progressing  Goal: Free from acute confusion related to pain meds throughout the shift  Outcome: Progressing     Problem: Pain - Adult  Goal: Verbalizes/displays adequate comfort level or baseline comfort level  Outcome: Progressing     Problem: Safety - Adult  Goal: Free from fall injury  Outcome: Progressing     Problem: Discharge Planning  Goal: Discharge to home or other facility with appropriate resources  Outcome: Progressing     Problem: Chronic Conditions and Co-morbidities  Goal: Patient's chronic conditions and co-morbidity symptoms are monitored and maintained or improved  Outcome: Progressing     Problem: Diabetes  Goal: Achieve decreasing blood glucose levels  by end of shift  Outcome: Progressing  Goal: Increase stability of blood glucose readings by end of shift  Outcome: Progressing  Goal: Decrease in ketones present in urine by end of shift  Outcome: Progressing  Goal: Maintain electrolyte levels within acceptable range throughout shift  Outcome: Progressing  Goal: Maintain glucose levels >70mg/dl to <250mg/dl throughout shift  Outcome: Progressing  Goal: No changes in neurological exam by end of shift  Outcome: Progressing  Goal: Learn about and adhere to nutrition recommendations by end of shift  Outcome: Progressing  Goal: Vital signs within normal range for age by end of shift  Outcome: Progressing  Goal: Increase self care and/or family involovement by end of shift  Outcome: Progressing  Goal: Receive DSME education by end of shift  Outcome: Progressing

## 2024-12-07 NOTE — CARE PLAN
Problem: Fall/Injury  Goal: Not fall by end of shift  Outcome: Progressing  Goal: Be free from injury by end of the shift  Outcome: Progressing  Goal: Verbalize understanding of personal risk factors for fall in the hospital  Outcome: Progressing  Goal: Verbalize understanding of risk factor reduction measures to prevent injury from fall in the home  Outcome: Progressing  Goal: Use assistive devices by end of the shift  Outcome: Progressing  Goal: Pace activities to prevent fatigue by end of the shift  Outcome: Progressing   The patient's goals for the shift include Patient pain will be controled during shift    The clinical goals for the shift include Patient's pain will decrease from 7 to 6 or 5 pain scale.

## 2024-12-11 ENCOUNTER — NURSING HOME VISIT (OUTPATIENT)
Dept: POST ACUTE CARE | Facility: EXTERNAL LOCATION | Age: 74
End: 2024-12-11
Payer: MEDICARE

## 2024-12-11 DIAGNOSIS — Z74.09 IMPAIRED FUNCTIONAL MOBILITY, BALANCE, GAIT, AND ENDURANCE: ICD-10-CM

## 2024-12-11 DIAGNOSIS — R52 PAIN: ICD-10-CM

## 2024-12-11 DIAGNOSIS — Z79.4 TYPE 2 DIABETES MELLITUS WITH FOOT ULCER, WITH LONG-TERM CURRENT USE OF INSULIN: ICD-10-CM

## 2024-12-11 DIAGNOSIS — Z79.01 CURRENT USE OF ANTICOAGULANT THERAPY: ICD-10-CM

## 2024-12-11 DIAGNOSIS — L97.509 TYPE 2 DIABETES MELLITUS WITH FOOT ULCER, WITH LONG-TERM CURRENT USE OF INSULIN: ICD-10-CM

## 2024-12-11 DIAGNOSIS — E11.621 TYPE 2 DIABETES MELLITUS WITH FOOT ULCER, WITH LONG-TERM CURRENT USE OF INSULIN: ICD-10-CM

## 2024-12-11 DIAGNOSIS — M86.9 OSTEOMYELITIS OF RIGHT FOOT, UNSPECIFIED TYPE (MULTI): Primary | ICD-10-CM

## 2024-12-11 PROCEDURE — 99310 SBSQ NF CARE HIGH MDM 45: CPT | Performed by: PHYSICIAN ASSISTANT

## 2024-12-11 NOTE — LETTER
"Patient: Dustin Ace  : 1950    Encounter Date: 2024  Name: Dustin Ace  YOB: 1950    Chief complaint: Osteomyelitis. S/P R BKA    HPI: This is a 74 year old  male who has a medical history remarkable for BPH, centrilobular emphysema, chronic hepatitis C (treated), CVA, diabetes mellitus, hypertension, CVA, PAD, Charcot deformity, chronic right foot osteomyelitis, lower leg edema, and paroxysmal atrial fibrillation. He presents to the hospital for evaluation of chronic leg wound. Patient reports chronic leg wound has not improved over the last 2.5 years. He noticed green pus coming out of his wound starting approximately 1 week earlier. Patient was started on IVF, Vancomycin and Zosyn. MRI of R foot on 2024 consistent with osteomyelitis. Patient was seen by vascular and infectious disease services. Right foot has been deemed unsalvageable and patient agreed to R BKA which was done on 2024. He had formalization of the BKA on 2024. Patient was restarted on his home medications and discharged to SNF for rehab    Patient seen and examined in his room. He is alert and cooperative. Denies fever, chills, sob, chest pain, nausea, diarrhea, or leg pain.    Review of systems:   ROS negative except were noted in HPI.    Code Status: full code    /72   Pulse 82   Temp 36.5 °C (97.7 °F)   Resp 18   Ht 1.88 m (6' 2\")   Wt 81.6 kg (180 lb)   SpO2 96%   BMI 23.11 kg/m²      Physical Exam  Constitutional:       General: He is not in acute distress.  HENT:      Head: Normocephalic.      Nose: Nose normal.      Mouth/Throat:      Mouth: Mucous membranes are moist.   Eyes:      Extraocular Movements: Extraocular movements intact.      Pupils: Pupils are equal, round, and reactive to light.   Cardiovascular:      Rate and Rhythm: Normal rate and regular rhythm.      Pulses: Normal pulses.   Pulmonary:      Effort: Pulmonary effort is normal.      " Breath sounds: No wheezing or rales.   Abdominal:      General: Bowel sounds are normal. There is no distension.      Palpations: Abdomen is soft.      Tenderness: There is no abdominal tenderness. There is no guarding.   Genitourinary:     Comments: Voiding  Musculoskeletal:         General: Normal range of motion.      Cervical back: Normal range of motion.      Comments: R BKA dressing intact. No drainage.   Lymphadenopathy:      Cervical: No cervical adenopathy.   Skin:     General: Skin is warm and dry.      Capillary Refill: Capillary refill takes 2 to 3 seconds.   Neurological:      General: No focal deficit present.      Mental Status: He is alert and oriented to person, place, and time.   Psychiatric:         Mood and Affect: Mood normal.         Behavior: Behavior normal.        Medications reviewed during visit at facility.  Atorvastatin 80 mg po daily  Methocarbamol 500 mg po q 8 hours prn  Tylenol 650 mg po q 4 hours prn  MOM 30 ml po daily prn  Amlodipine 5 mg po daily  Gabapentin 300 mg po tid  Miralax 17 gram po daily  Tamsulosin 0.4 mg po daily  Valium 5 mg po daily prn  Lispro insulin sliding scale coverage  Apixaban 5 mg po daily  Benzocaine-Menthol Mouth/Throat Lozenge 15-3.6 mg one lozenge q 2 hours prn  Vitamin B & C complex one tablet po daily  Vitamin D 3 400 UT po daily  Oxycodone 5 mg po q 6 hours prn    Labs reviewed at facility:   Contains abnormal data CBC  Order: [id number]   Status: Final result       Visible to patient: No (inaccessible in [facility])    0 Result Notes            Component  Ref Range & Units 8 d ago  (12/6/24) 9 d ago  (12/5/24) 10 d ago  (12/4/24) 11 d ago  (12/3/24) 12 d ago  (12/2/24) 12 d ago  (12/2/24) 13 d ago  (12/1/24)   WBC  4.4 - 11.3 x10*3/uL 11.1 13.1 High  12.5 High  11.7 High  6.5 6.8 9.0   nRBC  0.0 - 0.0 /100 WBCs 0.0 0.0 0.0 0.0 0.0 0.0 0.0   RBC  4.50 - 5.90 x10*6/uL 3.58 Low  3.51 Low  3.27 Low  3.07 Low  3.26 Low  3.16 Low  3.01 Low     Hemoglobin  13.5 - 17.5 g/dL 10.0 Low  10.0 Low  9.2 Low  8.7 Low  9.2 Low  8.7 Low  8.2 Low    Hematocrit  41.0 - 52.0 % 32.1 Low  32.1 Low  29.4 Low  27.4 Low  29.2 Low  28.6 Low  26.8 Low    MCV  80 - 100 fL 90 92 90 89 90 91 89   MCH  26.0 - 34.0 pg 27.9 28.5 28.1 28.3 28.2 27.5 27.2   MCHC  32.0 - 36.0 g/dL 31.2 Low  31.2 Low  31.3 Low  31.8 Low  31.5 Low  30.4 Low  30.6 Low    RDW  11.5 - 14.5 % 18.0 High  17.7 High  17.2 High  17.0 High  16.4 High  17.2 High  17.3 High    Platelets  150 - 450 x10*3/uL 348 339 302 303 310 315 336   Resulting Agency [facility]             Specimen Collected: 12/06/24 05:34 Last Resulted: 12/06/24 08:10      Contains abnormal data Renal function panel  Order: [id number]   Status: Final result       Visible to patient: No (inaccessible in [facility])    0 Result Notes            Component  Ref Range & Units 9 d ago  (12/5/24) 10 d ago  (12/4/24) 11 d ago  (12/3/24) 12 d ago  (12/2/24) 12 d ago  (12/2/24) 13 d ago  (12/1/24) 2 wk ago  (11/30/24)   Glucose  74 - 99 mg/dL 102 High  142 High  198 High  159 High  90 122 High  120 High    Sodium  136 - 145 mmol/L 139 140 138 141 138 136 139   Potassium  3.5 - 5.3 mmol/L 4.0 3.7 3.9 3.9 4.2 3.6 3.9   Chloride  98 - 107 mmol/L 103 105 106 110 High  106 104 106   Bicarbonate  21 - 32 mmol/L 25 26 22 22 24 24 24   Anion Gap  10 - 20 mmol/L 15 13 14 13 12 12 13   Urea Nitrogen  6 - 23 mg/dL 15 11 9 8 9 10 9   Creatinine  0.50 - 1.30 mg/dL 0.56 0.56 0.68 0.68 0.73 0.80 0.73   eGFR  >60 mL/min/1.73m*2 >90 >90 CM >90 CM >90 CM >90 CM >90 CM >90 CM   Comment: Calculations of estimated GFR are performed using the 2021 CKD-EPI Study Refit equation without the race variable for the IDMS-Traceable creatinine methods.  https://jasn.asnjournals.org/content/early/2021/09/22/ASN.9730286383   Calcium  8.6 - 10.6 mg/dL 8.7 8.9 8.8 7.9 Low  8.1 Low  8.5 Low  8.6   Phosphorus  2.5 - 4.9 mg/dL 3.1 2.7 CM 2.0 Low  CM  2.7 CM  2.9 CM 2.9 CM   Comment: The performance characteristics of phosphorus testing in heparinized plasma have been validated by the individual  laboratory site where testing is performed. Testing on heparinized plasma is not approved by the FDA; however, such approval is not necessary.   Albumin  3.4 - 5.0 g/dL 3.2 Low  3.4 3.3 Low  3.3 Low  2.9 Low  3.0 Low  3.0 Low    Resulting Agency [facility]             Specimen Collected: 12/05/24 05:24 Last Resulted: 12/05/24 06:48     Assessment/Plan   Problem List Items Addressed This Visit       Osteomyelitis of right foot, unspecified type (Multi) - Primary     S/P R BKA. Monitor incision for sign of infection or bleeding. BMP CBC with diff q  Tuesdays.         Diabetes mellitus, type 2 (Multi)     Review blood sugars. Blood sugar today 122. Continue with Lispro sliding scale coverage.         Impaired functional mobility, balance, gait, and endurance     PT and OT to assess and treat.         Pain     Oxycodone 5 mg po q 6 hours prn. Methocarbamol 500 mg po q 8 hours prn. Gabapentin 300 mg po tid         Current use of anticoagulant therapy     Apixaban 5 mg po daily            Time:  I spent 45 minutes or greater with the patient. Greater than 50% of this time was spent in counseling and or coordination of care. The time includes prep time of reviewing vital signs, report from direct nursing staff and or therapists, hospital documentation, reviewing labs, radiographs, diagnostic tests and or consultations, time directly spent with the patient interviewing, examining, and education regarding diagnosis, treatments, and medications, as well as documentation in the electronic medical record, and reviewing the plan of care and any new orders with the patient, nursing staff and other staff directly related to the patients care.      Ceasar Liu PA-C       Electronically Signed By: Ceasar Liu PA-C   12/14/24 12:56 PM

## 2024-12-13 ENCOUNTER — TELEPHONE (OUTPATIENT)
Dept: VASCULAR SURGERY | Facility: HOSPITAL | Age: 74
End: 2024-12-13
Payer: MEDICARE

## 2024-12-13 ENCOUNTER — NURSING HOME VISIT (OUTPATIENT)
Dept: POST ACUTE CARE | Facility: EXTERNAL LOCATION | Age: 74
End: 2024-12-13
Payer: MEDICARE

## 2024-12-13 DIAGNOSIS — M86.9 OSTEOMYELITIS OF RIGHT FOOT, UNSPECIFIED TYPE (MULTI): Primary | ICD-10-CM

## 2024-12-13 PROCEDURE — 99306 1ST NF CARE HIGH MDM 50: CPT | Performed by: INTERNAL MEDICINE

## 2024-12-13 NOTE — LETTER
Patient: Dustin Ace  : 1950    Encounter Date: 2024    Subjective  Patient ID: Dustin Ace is a 74 y.o. male who presents for No chief complaint on file..  HPI  Past medical history atrial fibrillation type 2 diabetes anemia neuropathy PAD hypertension hyperlipidemia right foot osteomyelitis I&D right foot  Status post recent hospital admission osteomyelitis right foot sepsis status post BKA    Patient overall doing okay he is main concern is that the vascular wound care instructions need to be followed by the nursing staff he states he believes one of the staff members tried to put on an old dirty sock and he should have a clean 1 every day which I agree with  Denies any fever chills drainage redness              Health Maintenance:      Colonoscopy:      Mammogram:      Pelvic/Pap:      Low dose chest CT:      Aorta duplex:      Optho:      Podiatry:        Vaccines:      Refer to Epic Vaccination Log        ROS:      General: denies fever/chills/weight loss      Head: denies HA/trauma/masses/dizziness      Eyes: denies vision change/loss of vision/blurry vision/diplopia/eye pain      Ears: denies hearing loss/tinnitus/otalgia/otorrhea      Nose: denies nasal drainage/anosmia      Throat: denies dysphagia/odynophagia      Lymphatics: denies lymph node swelling      Breast: denies masses/discharge/dimpling/skin changes      Cardiac: denies CP/palpitations/orthopnea/PND      Pulmonary: denies dyspnea/cough/wheezing      GI: denies abd pain/n/v/diarrhea/melena/hematochezia/hematemesis      : denies dysuria/hematuria/change frequency      Genital: denies genital discharge/lesions      Skin: denies rashes/lesions/masses      MSK: Right foot BKA ; 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     Physical Exam:     General: AO3, NAD     Head: atraumatic/NC     Eyes: EOMI/PERRLA. Negative APD     Ears: TM pearly gray, EAC clear. No lesions or erythema     Nose: symmetric nares, no discharge     Throat: trachea midline, uvula midline pink mucosa. No thyromegaly     Lymphatics: no cervical/supraclavicular/ant or posterior cervical adenopathy/axillary/inguinal adenopathy     Breast: not examined     Chest: no deformity or tenderness to palpation     Pulm: CTA b/l, no wheeze/rhonchi/rales. nonlabored     Cardiac: RRR +s1s2, no m/r/g.      GI: soft, NT/ND. Normoactive Bsx4. No rebound/guarding.     Rectal: not examined     Genital: not examined     MSK: Right foot BKA stump clean dry intact; 1+ palpable DP PT left lower extremity movement and sensation intact stump site from previous 1 through 3 tarsal amputation clean dry intact 5/5 strength UE LE. No edema/clubbing/cyanosis     Skin: no rashes/lesions     Vascular: 2+ palp DP PT radials b/l. Negative carotid bruit     Neuro: CNII-XII intact. No focal deficits. Reflexes 2/4 brachioradialis bicep tricep patellar achilles. Finger to nose intact.     Psych: appropriate mood/affect                    No results found for: \"BMPR1A\", \"CBCDIF\"      Assessment/Plan  Diagnoses and all orders for this visit:  Osteomyelitis of right foot, unspecified type (Multi)    PT OT SLP  Wound care with instructions as per vascular recommendations  Clean sock daily    Every Tuesday CBC BMP    Louis Lei DO, FACOI       Louis Lei DO      Electronically Signed By: Louis Lei DO   12/13/24  9:19 PM  "

## 2024-12-13 NOTE — TELEPHONE ENCOUNTER
Care giver called from patient's facility asking:   - is it ok to leave staples in until seen in [facility] on 1/2/25, and   - does he need to keep it covered with gauze.     Instructed them to keep staples in until seen by Dr. Gates on 1/2 and that the stump should be open to air, only use gauze if it is draining, per Dr. Gates.     All questions answered.   Allie Heath, PATY-CNP  Vascular Surgery

## 2024-12-14 VITALS
WEIGHT: 180 LBS | TEMPERATURE: 97.7 F | HEIGHT: 74 IN | BODY MASS INDEX: 23.1 KG/M2 | HEART RATE: 82 BPM | SYSTOLIC BLOOD PRESSURE: 128 MMHG | OXYGEN SATURATION: 96 % | RESPIRATION RATE: 18 BRPM | DIASTOLIC BLOOD PRESSURE: 72 MMHG

## 2024-12-14 PROBLEM — Z74.09 IMPAIRED FUNCTIONAL MOBILITY, BALANCE, GAIT, AND ENDURANCE: Status: ACTIVE | Noted: 2024-12-14

## 2024-12-14 PROBLEM — Z79.01 CURRENT USE OF ANTICOAGULANT THERAPY: Status: ACTIVE | Noted: 2024-12-14

## 2024-12-14 PROBLEM — R52 PAIN: Status: ACTIVE | Noted: 2024-12-14

## 2024-12-14 NOTE — PROGRESS NOTES
"12/11/2024  Name: Dustin Ace  YOB: 1950    Chief complaint: Osteomyelitis. S/P R BKA    HPI: This is a 74 year old  male who has a medical history remarkable for BPH, centrilobular emphysema, chronic hepatitis C (treated), CVA, diabetes mellitus, hypertension, CVA, PAD, Charcot deformity, chronic right foot osteomyelitis, lower leg edema, and paroxysmal atrial fibrillation. He presents to the hospital for evaluation of chronic leg wound. Patient reports chronic leg wound has not improved over the last 2.5 years. He noticed green pus coming out of his wound starting approximately 1 week earlier. Patient was started on IVF, Vancomycin and Zosyn. MRI of R foot on 11/25/2024 consistent with osteomyelitis. Patient was seen by vascular and infectious disease services. Right foot has been deemed unsalvageable and patient agreed to R BKA which was done on 11/29/2024. He had formalization of the BKA on 12/2/2024. Patient was restarted on his home medications and discharged to SNF for rehab    Patient seen and examined in his room. He is alert and cooperative. Denies fever, chills, sob, chest pain, nausea, diarrhea, or leg pain.    Review of systems:   ROS negative except were noted in HPI.    Code Status: full code    /72   Pulse 82   Temp 36.5 °C (97.7 °F)   Resp 18   Ht 1.88 m (6' 2\")   Wt 81.6 kg (180 lb)   SpO2 96%   BMI 23.11 kg/m²      Physical Exam  Constitutional:       General: He is not in acute distress.  HENT:      Head: Normocephalic.      Nose: Nose normal.      Mouth/Throat:      Mouth: Mucous membranes are moist.   Eyes:      Comments:  R pupil round and reacts to light   L eye prothesis.   Cardiovascular:      Rate and Rhythm: Normal rate and regular rhythm.      Pulses: Normal pulses.   Pulmonary:      Effort: Pulmonary effort is normal.      Breath sounds: No wheezing or rales.   Abdominal:      General: Bowel sounds are normal. There is no distension.      " Palpations: Abdomen is soft.      Tenderness: There is no abdominal tenderness. There is no guarding.   Genitourinary:     Comments: Voiding  Musculoskeletal:         General: Normal range of motion.      Cervical back: Normal range of motion.      Comments: R BKA dressing intact. No drainage.   Lymphadenopathy:      Cervical: No cervical adenopathy.   Skin:     General: Skin is warm and dry.      Capillary Refill: Capillary refill takes 2 to 3 seconds.   Neurological:      General: No focal deficit present.      Mental Status: He is alert and oriented to person, place, and time.   Psychiatric:         Mood and Affect: Mood normal.         Behavior: Behavior normal.        Medications reviewed during visit at facility.  Atorvastatin 80 mg po daily  Methocarbamol 500 mg po q 8 hours prn  Tylenol 650 mg po q 4 hours prn  MOM 30 ml po daily prn  Amlodipine 5 mg po daily  Gabapentin 300 mg po tid  Miralax 17 gram po daily  Tamsulosin 0.4 mg po daily  Valium 5 mg po daily prn  Lispro insulin sliding scale coverage  Apixaban 5 mg po daily  Benzocaine-Menthol Mouth/Throat Lozenge 15-3.6 mg one lozenge q 2 hours prn  Vitamin B & C complex one tablet po daily  Vitamin D 3 400 UT po daily  Oxycodone 5 mg po q 6 hours prn    Labs reviewed at facility:   Contains abnormal data CBC  Order: [id number]   Status: Final result       Visible to patient: No (inaccessible in [facility])    0 Result Notes            Component  Ref Range & Units 8 d ago  (12/6/24) 9 d ago  (12/5/24) 10 d ago  (12/4/24) 11 d ago  (12/3/24) 12 d ago  (12/2/24) 12 d ago  (12/2/24) 13 d ago  (12/1/24)   WBC  4.4 - 11.3 x10*3/uL 11.1 13.1 High  12.5 High  11.7 High  6.5 6.8 9.0   nRBC  0.0 - 0.0 /100 WBCs 0.0 0.0 0.0 0.0 0.0 0.0 0.0   RBC  4.50 - 5.90 x10*6/uL 3.58 Low  3.51 Low  3.27 Low  3.07 Low  3.26 Low  3.16 Low  3.01 Low    Hemoglobin  13.5 - 17.5 g/dL 10.0 Low  10.0 Low  9.2 Low  8.7 Low  9.2 Low  8.7 Low  8.2 Low    Hematocrit  41.0 - 52.0 % 32.1 Low   32.1 Low  29.4 Low  27.4 Low  29.2 Low  28.6 Low  26.8 Low    MCV  80 - 100 fL 90 92 90 89 90 91 89   MCH  26.0 - 34.0 pg 27.9 28.5 28.1 28.3 28.2 27.5 27.2   MCHC  32.0 - 36.0 g/dL 31.2 Low  31.2 Low  31.3 Low  31.8 Low  31.5 Low  30.4 Low  30.6 Low    RDW  11.5 - 14.5 % 18.0 High  17.7 High  17.2 High  17.0 High  16.4 High  17.2 High  17.3 High    Platelets  150 - 450 x10*3/uL 348 339 302 303 310 315 336   Resulting Agency [facility]             Specimen Collected: 12/06/24 05:34 Last Resulted: 12/06/24 08:10      Contains abnormal data Renal function panel  Order: [id number]   Status: Final result       Visible to patient: No (inaccessible in [facility])    0 Result Notes            Component  Ref Range & Units 9 d ago  (12/5/24) 10 d ago  (12/4/24) 11 d ago  (12/3/24) 12 d ago  (12/2/24) 12 d ago  (12/2/24) 13 d ago  (12/1/24) 2 wk ago  (11/30/24)   Glucose  74 - 99 mg/dL 102 High  142 High  198 High  159 High  90 122 High  120 High    Sodium  136 - 145 mmol/L 139 140 138 141 138 136 139   Potassium  3.5 - 5.3 mmol/L 4.0 3.7 3.9 3.9 4.2 3.6 3.9   Chloride  98 - 107 mmol/L 103 105 106 110 High  106 104 106   Bicarbonate  21 - 32 mmol/L 25 26 22 22 24 24 24   Anion Gap  10 - 20 mmol/L 15 13 14 13 12 12 13   Urea Nitrogen  6 - 23 mg/dL 15 11 9 8 9 10 9   Creatinine  0.50 - 1.30 mg/dL 0.56 0.56 0.68 0.68 0.73 0.80 0.73   eGFR  >60 mL/min/1.73m*2 >90 >90 CM >90 CM >90 CM >90 CM >90 CM >90 CM   Comment: Calculations of estimated GFR are performed using the 2021 CKD-EPI Study Refit equation without the race variable for the IDMS-Traceable creatinine methods.  https://jasn.asnjournals.org/content/early/2021/09/22/ASN.4225013347   Calcium  8.6 - 10.6 mg/dL 8.7 8.9 8.8 7.9 Low  8.1 Low  8.5 Low  8.6   Phosphorus  2.5 - 4.9 mg/dL 3.1 2.7 CM 2.0 Low  CM  2.7 CM 2.9 CM 2.9 CM   Comment: The performance characteristics of phosphorus testing in heparinized plasma have been validated by the  individual  laboratory site where testing is performed. Testing on heparinized plasma is not approved by the FDA; however, such approval is not necessary.   Albumin  3.4 - 5.0 g/dL 3.2 Low  3.4 3.3 Low  3.3 Low  2.9 Low  3.0 Low  3.0 Low    Resulting Agency [facility]             Specimen Collected: 12/05/24 05:24 Last Resulted: 12/05/24 06:48     Assessment/Plan    Problem List Items Addressed This Visit       Osteomyelitis of right foot, unspecified type (Multi) - Primary     S/P R BKA. Monitor incision for sign of infection or bleeding. BMP CBC with diff q  Tuesdays.         Diabetes mellitus, type 2 (Multi)     Review blood sugars. Blood sugar today 122. Continue with Lispro sliding scale coverage.         Impaired functional mobility, balance, gait, and endurance     PT and OT to assess and treat.         Pain     Oxycodone 5 mg po q 6 hours prn. Methocarbamol 500 mg po q 8 hours prn. Gabapentin 300 mg po tid         Current use of anticoagulant therapy     Apixaban 5 mg po daily            Time:  I spent 45 minutes or greater with the patient. Greater than 50% of this time was spent in counseling and or coordination of care. The time includes prep time of reviewing vital signs, report from direct nursing staff and or therapists, hospital documentation, reviewing labs, radiographs, diagnostic tests and or consultations, time directly spent with the patient interviewing, examining, and education regarding diagnosis, treatments, and medications, as well as documentation in the electronic medical record, and reviewing the plan of care and any new orders with the patient, nursing staff and other staff directly related to the patients care.      Ceasar Liu PA-C

## 2024-12-14 NOTE — PROGRESS NOTES
Subjective   Patient ID: Dustin Ace is a 74 y.o. male who presents for No chief complaint on file..  HPI  Past medical history atrial fibrillation type 2 diabetes anemia neuropathy PAD hypertension hyperlipidemia right foot osteomyelitis I&D right foot  Status post recent hospital admission osteomyelitis right foot sepsis status post BKA    Patient overall doing okay he is main concern is that the vascular wound care instructions need to be followed by the nursing staff he states he believes one of the staff members tried to put on an old dirty sock and he should have a clean 1 every day which I agree with  Denies any fever chills drainage redness              Health Maintenance:      Colonoscopy:      Mammogram:      Pelvic/Pap:      Low dose chest CT:      Aorta duplex:      Optho:      Podiatry:        Vaccines:      Refer to Epic Vaccination Log        ROS:      General: denies fever/chills/weight loss      Head: denies HA/trauma/masses/dizziness      Eyes: denies vision change/loss of vision/blurry vision/diplopia/eye pain      Ears: denies hearing loss/tinnitus/otalgia/otorrhea      Nose: denies nasal drainage/anosmia      Throat: denies dysphagia/odynophagia      Lymphatics: denies lymph node swelling      Breast: denies masses/discharge/dimpling/skin changes      Cardiac: denies CP/palpitations/orthopnea/PND      Pulmonary: denies dyspnea/cough/wheezing      GI: denies abd pain/n/v/diarrhea/melena/hematochezia/hematemesis      : denies dysuria/hematuria/change frequency      Genital: denies genital discharge/lesions      Skin: denies rashes/lesions/masses      MSK: Right foot BKA ; 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     Physical Exam:     General: AO3,  "NAD     Head: atraumatic/NC     Eyes: EOMI/PERRLA. Negative APD     Ears: TM pearly gray, EAC clear. No lesions or erythema     Nose: symmetric nares, no discharge     Throat: trachea midline, uvula midline pink mucosa. No thyromegaly     Lymphatics: no cervical/supraclavicular/ant or posterior cervical adenopathy/axillary/inguinal adenopathy     Breast: not examined     Chest: no deformity or tenderness to palpation     Pulm: CTA b/l, no wheeze/rhonchi/rales. nonlabored     Cardiac: RRR +s1s2, no m/r/g.      GI: soft, NT/ND. Normoactive Bsx4. No rebound/guarding.     Rectal: not examined     Genital: not examined     MSK: Right foot BKA stump clean dry intact; 1+ palpable DP PT left lower extremity movement and sensation intact stump site from previous 1 through 3 tarsal amputation clean dry intact 5/5 strength UE LE. No edema/clubbing/cyanosis     Skin: no rashes/lesions     Vascular: 2+ palp DP PT radials b/l. Negative carotid bruit     Neuro: CNII-XII intact. No focal deficits. Reflexes 2/4 brachioradialis bicep tricep patellar achilles. Finger to nose intact.     Psych: appropriate mood/affect                    No results found for: \"BMPR1A\", \"CBCDIF\"      Assessment/Plan   Diagnoses and all orders for this visit:  Osteomyelitis of right foot, unspecified type (Multi)    PT OT SLP  Wound care with instructions as per vascular recommendations  Clean sock daily    Every Tuesday CBC BMP    Louis Lei DO, FACOI       Louis Lei DO  "

## 2024-12-18 LAB
LABORATORY COMMENT REPORT: NORMAL
PATH REPORT.FINAL DX SPEC: NORMAL
PATH REPORT.GROSS SPEC: NORMAL
PATH REPORT.RELEVANT HX SPEC: NORMAL
PATH REPORT.TOTAL CANCER: NORMAL

## 2024-12-19 ENCOUNTER — NURSING HOME VISIT (OUTPATIENT)
Dept: POST ACUTE CARE | Facility: EXTERNAL LOCATION | Age: 74
End: 2024-12-19
Payer: MEDICARE

## 2024-12-19 DIAGNOSIS — L97.509 TYPE 2 DIABETES MELLITUS WITH FOOT ULCER, WITH LONG-TERM CURRENT USE OF INSULIN: ICD-10-CM

## 2024-12-19 DIAGNOSIS — N40.0 BENIGN PROSTATIC HYPERPLASIA WITHOUT LOWER URINARY TRACT SYMPTOMS: ICD-10-CM

## 2024-12-19 DIAGNOSIS — Z79.01 CURRENT USE OF ANTICOAGULANT THERAPY: ICD-10-CM

## 2024-12-19 DIAGNOSIS — Z79.4 TYPE 2 DIABETES MELLITUS WITH FOOT ULCER, WITH LONG-TERM CURRENT USE OF INSULIN: ICD-10-CM

## 2024-12-19 DIAGNOSIS — Z74.09 IMPAIRED FUNCTIONAL MOBILITY, BALANCE, GAIT, AND ENDURANCE: ICD-10-CM

## 2024-12-19 DIAGNOSIS — I10 PRIMARY HYPERTENSION: ICD-10-CM

## 2024-12-19 DIAGNOSIS — E11.621 TYPE 2 DIABETES MELLITUS WITH FOOT ULCER, WITH LONG-TERM CURRENT USE OF INSULIN: ICD-10-CM

## 2024-12-19 DIAGNOSIS — S88.111S: Primary | ICD-10-CM

## 2024-12-19 PROCEDURE — 99316 NF DSCHRG MGMT 30 MIN+: CPT | Performed by: PHYSICIAN ASSISTANT

## 2024-12-19 NOTE — LETTER
Patient: Dustin Ace  : 1950    Encounter Date: 2024  Name: Dustin Ace  YOB: 1950    Chief complaint: R BKA. Discharge summary.    HPI: This is a 74 year old  male who has a medical history remarkable for BPH, centrilobular emphysema, chronic hepatitis C (treated), CVA, diabetes mellitus, hypertension, CVA, PAD, Charcot deformity, chronic right foot osteomyelitis, lower leg edema, and paroxysmal atrial fibrillation. He presents to the hospital for evaluation of chronic leg wound. Patient reports chronic leg wound has not improved over the last 2.5 years. He noticed green pus coming out of his wound starting approximately 1 week earlier. Patient was started on IVF, Vancomycin and Zosyn. MRI of R foot on 2024 consistent with osteomyelitis. Patient was seen by vascular and infectious disease services. Right foot has been deemed unsalvageable and patient agreed to R BKA which was done on 2024. He had formalization of the BKA on 2024. Patient was restarted on his home medications and discharged to SNF for rehab.    Rehab course: Patient received physical and occupational therapy. He was able to stand with walker, and complete success transfers from bed to wheelchair. R stump incision maintain with staples. No drainage noted. He wore stump  with limb guard. Patient will have follow up appointment with vascular surgery on 2025. Hard, scabbed area of plantar aspect to L foot received daily inspection and skinprep, abd, kerlix dressing. Blood sugars were followed. Average blood sugar reading between 100-140 mg/dl. Patient will be discharged to home with home health care, PT, OT, nurse and aide.    Wound Check  He was originally treated 10 to 14 days ago. Previous treatment included IV/IM antibiotics (R BKA). There has been no drainage from the wound. There is no redness present. There is no swelling present. The pain has improved. He  "has no difficulty moving the affected extremity or digit.     Review of systems:   ROS negative except were noted in HPI.    Code Status: full code    /63   Pulse 75   Temp 36.5 °C (97.7 °F)   Resp 18   Ht 1.88 m (6' 2\")   Wt 82.6 kg (182 lb)   SpO2 98%   BMI 23.37 kg/m²      Physical Exam  Constitutional:       General: He is not in acute distress.  HENT:      Head: Normocephalic.      Nose: Nose normal.      Mouth/Throat:      Mouth: Mucous membranes are moist.   Eyes:      Extraocular Movements: Extraocular movements intact.      Pupils: R Pupil  round, and reactive to light.                  L prosthetic eye.  Cardiovascular:      Rate and Rhythm: Normal rate and regular rhythm.      Pulses: Normal pulses.   Pulmonary:      Effort: Pulmonary effort is normal.      Breath sounds: No wheezing or rales.   Abdominal:      General: Bowel sounds are normal. There is no distension.      Palpations: Abdomen is soft.      Tenderness: There is no abdominal tenderness. There is no guarding.   Genitourinary:     Comments: Voiding  Musculoskeletal:         General: Normal range of motion.      Cervical back: Normal range of motion.      Comments: R BKA with staples. No drainage.   Lymphadenopathy:      Cervical: No cervical adenopathy.   Skin:     General: Skin is warm and dry.      Capillary Refill: Capillary refill takes 2 to 3 seconds.   Neurological:      General: No focal deficit present.      Mental Status: He is alert and oriented to person, place, and time.   Psychiatric:         Mood and Affect: Mood normal.         Behavior: Behavior normal.     Medications reviewed during visit at facility.  Atorvastatin 80 mg po daily  Methocarbamol 500 mg po q 8 hours prn  Tylenol 650 mg po q 4 hours prn  MOM 30 ml po daily prn  Amlodipine 5 mg po daily  Gabapentin 300 mg po tid  Miralax 17 gram po daily  Tamsulosin 0.4 mg po daily  Valium 5 mg po daily prn  Lispro insulin sliding scale coverage  Apixaban 5 mg po " bid  Benzocaine-Menthol Mouth/Throat Lozenge 15-3.6 mg one lozenge q 2 hours prn  Vitamin B & C complex one tablet po daily  Vitamin D 3 400 UT po daily  Oxycodone 5 mg po q 6 hours prn  Labs reviewed at facility:    Assessment/Plan   Problem List Items Addressed This Visit       HTN (hypertension)     Review BP readings. Continue with Amlodipine 5 mg po daily.         Diabetes mellitus, type 2 (Multi)     Review blood sugars. Blood sugar today 91. Continue with Lispro sliding scale coverage.            Impaired functional mobility, balance, gait, and endurance     Order home health care, PT, OT, nurse, aide.         Current use of anticoagulant therapy     Apixaban 5 mg po bid. Monitor for bleeding.         Complete below knee amputation of lower extremity, right, sequela (Multi) - Primary     Monitor incision line for signs of infection. Schedule follow up with vascular surgery service Dr. Gates for 1/2/2025         Benign prostatic hyperplasia without lower urinary tract symptoms     Voiding without difficulty. Continue Tamsulosin 0.4 mg po daily            Time:  I spent 35 minutes or greater with the patient. Greater than 50% of this time was spent in counseling and or coordination of care. The time includes prep time of reviewing vital signs, report from direct nursing staff and or therapists, hospital documentation, reviewing labs, radiographs, diagnostic tests and or consultations, time directly spent with the patient interviewing, examining, and education regarding diagnosis, treatments, and medications, as well as documentation in the electronic medical record, and reviewing the plan of care and any new orders with the patient, nursing staff and other staff directly related to the patients care.      Ceasar Liu PA-C       Electronically Signed By: Ceasar Liu PA-C   12/22/24 11:27 AM

## 2024-12-22 VITALS
DIASTOLIC BLOOD PRESSURE: 63 MMHG | RESPIRATION RATE: 18 BRPM | HEIGHT: 74 IN | TEMPERATURE: 97.7 F | HEART RATE: 75 BPM | SYSTOLIC BLOOD PRESSURE: 119 MMHG | OXYGEN SATURATION: 98 % | BODY MASS INDEX: 23.36 KG/M2 | WEIGHT: 182 LBS

## 2024-12-22 PROBLEM — N40.0 BENIGN PROSTATIC HYPERPLASIA WITHOUT LOWER URINARY TRACT SYMPTOMS: Status: ACTIVE | Noted: 2024-12-22

## 2024-12-22 PROBLEM — S88.111S: Status: ACTIVE | Noted: 2024-12-22

## 2024-12-22 NOTE — ASSESSMENT & PLAN NOTE
Monitor incision line for signs of infection. Schedule follow up with vascular surgery service Dr. Gates for 1/2/2025

## 2024-12-22 NOTE — PROGRESS NOTES
12/19/2024  Name: Dustin Ace  YOB: 1950    Chief complaint: R BKA. Discharge summary.    HPI: This is a 74 year old  male who has a medical history remarkable for BPH, centrilobular emphysema, chronic hepatitis C (treated), CVA, diabetes mellitus, hypertension, CVA, PAD, Charcot deformity, chronic right foot osteomyelitis, lower leg edema, and paroxysmal atrial fibrillation. He presents to the hospital for evaluation of chronic leg wound. Patient reports chronic leg wound has not improved over the last 2.5 years. He noticed green pus coming out of his wound starting approximately 1 week earlier. Patient was started on IVF, Vancomycin and Zosyn. MRI of R foot on 11/25/2024 consistent with osteomyelitis. Patient was seen by vascular and infectious disease services. Right foot has been deemed unsalvageable and patient agreed to R BKA which was done on 11/29/2024. He had formalization of the BKA on 12/2/2024. Patient was restarted on his home medications and discharged to SNF for rehab.    Rehab course: Patient received physical and occupational therapy. He was able to stand with walker, and complete success transfers from bed to wheelchair. R stump incision maintain with staples. No drainage noted. He wore stump  with limb guard. Patient will have follow up appointment with vascular surgery on 1/2/2025. Hard, scabbed area of plantar aspect to L foot received daily inspection and skinprep, abd, kerlix dressing. Blood sugars were followed. Average blood sugar reading between 100-140 mg/dl. Patient will be discharged to home with home health care, PT, OT, nurse and aide.    Wound Check  He was originally treated 10 to 14 days ago. Previous treatment included IV/IM antibiotics (R BKA). There has been no drainage from the wound. There is no redness present. There is no swelling present. The pain has improved. He has no difficulty moving the affected extremity or digit.     Review of  "systems:   ROS negative except were noted in HPI.    Code Status: full code    /63   Pulse 75   Temp 36.5 °C (97.7 °F)   Resp 18   Ht 1.88 m (6' 2\")   Wt 82.6 kg (182 lb)   SpO2 98%   BMI 23.37 kg/m²      Physical Exam  Constitutional:       General: He is not in acute distress.  HENT:      Head: Normocephalic.      Nose: Nose normal.      Mouth/Throat:      Mouth: Mucous membranes are moist.   Eyes:      Extraocular Movements: Extraocular movements intact.      Pupils: R Pupil  round, and reactive to light.                  L prosthetic eye.  Cardiovascular:      Rate and Rhythm: Normal rate and regular rhythm.      Pulses: Normal pulses.   Pulmonary:      Effort: Pulmonary effort is normal.      Breath sounds: No wheezing or rales.   Abdominal:      General: Bowel sounds are normal. There is no distension.      Palpations: Abdomen is soft.      Tenderness: There is no abdominal tenderness. There is no guarding.   Genitourinary:     Comments: Voiding  Musculoskeletal:         General: Normal range of motion.      Cervical back: Normal range of motion.      Comments: R BKA with staples. No drainage.   Lymphadenopathy:      Cervical: No cervical adenopathy.   Skin:     General: Skin is warm and dry.      Capillary Refill: Capillary refill takes 2 to 3 seconds.   Neurological:      General: No focal deficit present.      Mental Status: He is alert and oriented to person, place, and time.   Psychiatric:         Mood and Affect: Mood normal.         Behavior: Behavior normal.     Medications reviewed during visit at facility.  Atorvastatin 80 mg po daily  Methocarbamol 500 mg po q 8 hours prn  Tylenol 650 mg po q 4 hours prn  MOM 30 ml po daily prn  Amlodipine 5 mg po daily  Gabapentin 300 mg po tid  Miralax 17 gram po daily  Tamsulosin 0.4 mg po daily  Valium 5 mg po daily prn  Lispro insulin sliding scale coverage  Apixaban 5 mg po bid  Benzocaine-Menthol Mouth/Throat Lozenge 15-3.6 mg one lozenge q 2 " hours prn  Vitamin B & C complex one tablet po daily  Vitamin D 3 400 UT po daily  Oxycodone 5 mg po q 6 hours prn  Labs reviewed at facility:    Assessment/Plan    Problem List Items Addressed This Visit       HTN (hypertension)     Review BP readings. Continue with Amlodipine 5 mg po daily.         Diabetes mellitus, type 2 (Multi)     Review blood sugars. Blood sugar today 91. Continue with Lispro sliding scale coverage.            Impaired functional mobility, balance, gait, and endurance     Order home health care, PT, OT, nurse, aide.         Current use of anticoagulant therapy     Apixaban 5 mg po bid. Monitor for bleeding.         Complete below knee amputation of lower extremity, right, sequela (Multi) - Primary     Monitor incision line for signs of infection. Schedule follow up with vascular surgery service Dr. Gates for 1/2/2025         Benign prostatic hyperplasia without lower urinary tract symptoms     Voiding without difficulty. Continue Tamsulosin 0.4 mg po daily            Time:  I spent 35 minutes or greater with the patient. Greater than 50% of this time was spent in counseling and or coordination of care. The time includes prep time of reviewing vital signs, report from direct nursing staff and or therapists, hospital documentation, reviewing labs, radiographs, diagnostic tests and or consultations, time directly spent with the patient interviewing, examining, and education regarding diagnosis, treatments, and medications, as well as documentation in the electronic medical record, and reviewing the plan of care and any new orders with the patient, nursing staff and other staff directly related to the patients care.      Ceasar Liu PA-C

## 2025-01-02 ENCOUNTER — OFFICE VISIT (OUTPATIENT)
Dept: VASCULAR SURGERY | Facility: CLINIC | Age: 75
End: 2025-01-02
Payer: MEDICARE

## 2025-01-02 VITALS
BODY MASS INDEX: 22.38 KG/M2 | DIASTOLIC BLOOD PRESSURE: 62 MMHG | HEART RATE: 70 BPM | WEIGHT: 180 LBS | SYSTOLIC BLOOD PRESSURE: 112 MMHG | OXYGEN SATURATION: 96 % | HEIGHT: 75 IN

## 2025-01-02 DIAGNOSIS — S88.111D BELOW-KNEE AMPUTATION OF RIGHT LOWER EXTREMITY, SUBSEQUENT ENCOUNTER (MULTI): Primary | ICD-10-CM

## 2025-01-02 PROCEDURE — 99211 OFF/OP EST MAY X REQ PHY/QHP: CPT | Performed by: STUDENT IN AN ORGANIZED HEALTH CARE EDUCATION/TRAINING PROGRAM

## 2025-01-02 PROCEDURE — 3078F DIAST BP <80 MM HG: CPT | Performed by: STUDENT IN AN ORGANIZED HEALTH CARE EDUCATION/TRAINING PROGRAM

## 2025-01-02 PROCEDURE — 3074F SYST BP LT 130 MM HG: CPT | Performed by: STUDENT IN AN ORGANIZED HEALTH CARE EDUCATION/TRAINING PROGRAM

## 2025-01-02 PROCEDURE — 1111F DSCHRG MED/CURRENT MED MERGE: CPT | Performed by: STUDENT IN AN ORGANIZED HEALTH CARE EDUCATION/TRAINING PROGRAM

## 2025-01-02 RX ORDER — SILVER/HYDROCOLLOID DRESSING 1.2%-2"X2"
BANDAGE TOPICAL
Qty: 1 EACH | Refills: 0 | Status: SHIPPED | OUTPATIENT
Start: 2025-01-02

## 2025-01-02 RX ORDER — LANCETS 30 GAUGE
EACH MISCELLANEOUS
COMMUNITY
Start: 2024-12-26

## 2025-01-02 RX ORDER — SODIUM CHLORIDE 0.9 % (FLUSH) 0.9 %
10 SYRINGE (ML) INJECTION DAILY
Qty: 300 ML | Refills: 0 | Status: SHIPPED | OUTPATIENT
Start: 2025-01-02 | End: 2025-02-01

## 2025-01-02 RX ORDER — LANCETS 33 GAUGE
EACH MISCELLANEOUS
COMMUNITY
Start: 2024-12-27

## 2025-01-03 NOTE — PROGRESS NOTES
Reason for Visit: No chief complaint on file..  Referring Clinician: No ref. provider found     History of Present Illness  Dustin Ace is a 74 y.o. male with history of Charcot deformity for which he underwent a right BKA with TMR on 12/2/2024. He tolerated that procedure well and presents today for routine postoperative visit. HE is doing well. He has been discharged from the rehab. He denies any significant pain. He takes occasional Tylenol for pain if needed. Denies any phantom pain. Denies fevers/chills,drainage from incision, or erythema of the incision.     Past Medical History  He has a past medical history of Arrhythmia, Cerebral vascular accident (Multi), Hypertension, Irregular heart beat, Peripheral vascular disease (CMS-HCC), Sleep apnea, and Type 2 diabetes mellitus.    Past Surgical History  He has no past surgical history on file.    Social History  He reports that he has never smoked. He has never used smokeless tobacco. He reports that he does not drink alcohol and does not use drugs.    Family History  No family history on file.    Allergies  Patient has no known allergies.    Outpatient Medications  Current Outpatient Medications   Medication Instructions    acetaminophen (TYLENOL) 975 mg, oral, Every 8 hours PRN    alum-mag hydroxide-simeth (Mylanta) 200-200-20 mg/5 mL oral suspension 30 mL, oral, Every 6 hours PRN    amLODIPine (NORVASC) 5 mg, Daily RT    apixaban (ELIQUIS) 5 mg, 2 times daily    atorvastatin (Lipitor) 80 mg tablet 1 tablet, Daily    B complex-vitamin C tablet 1 tablet, oral, Daily    benzocaine-menthol (Cepastat Sore Throat) lozenge 1 lozenge, Mouth/Throat, Every 2 hour PRN    blood sugar diagnostic strip Use with blood glucose test    cholecalciferol (VITAMIN D-3) 400 Units, Daily RT    cyanocobalamin (VITAMIN B-12) 1,000 mcg, Daily    diazePAM (VALIUM) 5 mg, oral, Daily PRN    gabapentin (NEURONTIN) 300 mg, oral,  "3 times daily    insulin lispro 0-5 Units, subcutaneous, 3 times daily before meals, Take as directed per insulin instructions.    methocarbamol (ROBAXIN) 500 mg, oral, Nightly PRN    OneTouch Delica Plus Lancet 33 gauge misc     OneTouch Ultra2 Meter misc     polyethylene glycol (GLYCOLAX, MIRALAX) 17 g, oral, Daily    silver-hydrocolloid dressing (Aquacel-AG Advantage) 1.2-4 X 5 %-\" bandage Moisten a postage size piece with saline and apply on the two wounds on the stump daily.    sodium chloride 0.9% (Normal Saline Flush) flush 10 mL, intravenous, Daily    tamsulosin (FLOMAX) 0.4 mg, oral, Daily       Review of Systems  Review of Systems   Constitutional: Negative.   HENT: Negative.     Eyes: Negative.    Cardiovascular: Negative.    Respiratory: Negative.     Endocrine: Negative.    Hematologic/Lymphatic: Negative.    Skin: Negative.    Musculoskeletal: Negative.    Gastrointestinal: Negative.    Genitourinary: Negative.    Neurological: Negative.    Psychiatric/Behavioral: Negative.         Last Recorded Vitals  Vitals:    01/02/25 1354 01/02/25 1356   BP: 123/72 112/62   BP Location: Left arm Right arm   Patient Position: Sitting Sitting   Pulse: 70    SpO2: 96%    Weight: 81.6 kg (180 lb)    Height: 1.905 m (6' 3\")        Physical Examination  General: Well appearing, well-nourished, in no acute distress.  HEENT: Normocephalic atraumatic, pupils equal and reactive to light, extraocular muscles intact, no conjunctival injection, oropharynx clear without exudates.  Neck: Normal carotid arterial pulses, no arterial bruits, no thyromegaly.  Cardiac: Regular rhythm and normal heart rate.  Pulmonary: No increased work of breathing, no wheezes or crackles.  GI: Soft, ND, NT  Lower extremities: R BKA incision c/d/I. No surrounding erythema, induration or drainage.           Skin: Skin intact. No significant rashes or lesions present.  Neuro: Alert and oriented x 3, normal attention and cognition, no focal motor or " "sensory neurologic deficits.  Psych: Normal affect and mood.  Musculoskeletal: Normal gait normal muscle tone.    Laboratory Studies  Lab Results   Component Value Date    GLUCOSE 102 (H) 12/05/2024    CALCIUM 8.7 12/05/2024     12/05/2024    K 4.0 12/05/2024    CO2 25 12/05/2024     12/05/2024    BUN 15 12/05/2024    CREATININE 0.56 12/05/2024     Lab Results   Component Value Date    ALT 19 12/02/2024    AST 12 12/02/2024    ALKPHOS 54 12/02/2024    BILITOT 1.2 12/02/2024         Lab Results   Component Value Date    CHOL 62 11/26/2024     Lab Results   Component Value Date    HDL 13.9 11/26/2024     Lab Results   Component Value Date    LDLCALC 34 11/26/2024     Lab Results   Component Value Date    TRIG 69 11/26/2024     No components found for: \"CHOLHDL\"  Lab Results   Component Value Date    HGBA1C 5.6 11/25/2024     No components found for: \"UACR\"    Assessment and Plan  Problem List Items Addressed This Visit    None  Visit Diagnoses       Below-knee amputation of right lower extremity, subsequent encounter (Multi)    -  Primary    Relevant Medications    silver-hydrocolloid dressing (Aquacel-AG Advantage) 1.2-4 X 5 %-\" bandage    sodium chloride 0.9% (Normal Saline Flush) flush          - Patient is doing well postoperatively. His sutures and staples were removed. There is an area with slight separation of the skin. Recommend application of Aquacel AG. In the meantime, continue compression therapy.   - Will have patient return to clinic in 1-2 weeks for incision check. Will refer to Moira at that time.     Sis Gates MD      "

## 2025-01-08 PROBLEM — Z86.73 HISTORY OF ISCHEMIC LEFT MCA STROKE: Status: ACTIVE | Noted: 2024-03-06

## 2025-01-08 PROBLEM — I63.9 ISCHEMIC STROKE (MULTI): Status: ACTIVE | Noted: 2021-04-01

## 2025-01-08 PROBLEM — K40.90 INGUINAL HERNIA OF RIGHT SIDE WITHOUT OBSTRUCTION OR GANGRENE: Status: ACTIVE | Noted: 2021-06-10

## 2025-01-08 PROBLEM — M79.671 BILATERAL FOOT PAIN: Status: ACTIVE | Noted: 2024-01-05

## 2025-01-08 PROBLEM — E55.9 VITAMIN D DEFICIENCY, UNSPECIFIED: Status: ACTIVE | Noted: 2022-04-15

## 2025-01-08 PROBLEM — B18.2 CHRONIC VIRAL HEPATITIS C (MULTI): Status: ACTIVE | Noted: 2022-04-14

## 2025-01-08 PROBLEM — M54.50 ACUTE RIGHT-SIDED LOW BACK PAIN WITHOUT SCIATICA: Status: ACTIVE | Noted: 2021-04-14

## 2025-01-08 PROBLEM — M79.89 RIGHT LEG SWELLING: Status: ACTIVE | Noted: 2021-04-14

## 2025-01-08 PROBLEM — T81.30XA DEHISCENCE OF WOUND: Status: ACTIVE | Noted: 2024-07-15

## 2025-01-08 PROBLEM — M76.52 PATELLAR TENDINITIS OF LEFT KNEE: Status: ACTIVE | Noted: 2018-10-30

## 2025-01-08 PROBLEM — M62.81 MUSCLE WEAKNESS (GENERALIZED): Status: ACTIVE | Noted: 2024-03-19

## 2025-01-08 PROBLEM — L97.419: Status: ACTIVE | Noted: 2024-04-11

## 2025-01-08 PROBLEM — F10.11 ALCOHOL ABUSE, IN REMISSION: Status: ACTIVE | Noted: 2024-03-18

## 2025-01-08 PROBLEM — F33.9 RECURRENT DEPRESSION (CMS-HCC): Status: ACTIVE | Noted: 2025-01-08

## 2025-01-08 PROBLEM — R74.01 TRANSAMINITIS: Status: ACTIVE | Noted: 2024-03-19

## 2025-01-08 PROBLEM — S98.112A: Status: ACTIVE | Noted: 2022-04-14

## 2025-01-08 PROBLEM — E87.1 HYPONATREMIA: Status: ACTIVE | Noted: 2022-05-25

## 2025-01-08 PROBLEM — M86.9 OSTEOMYELITIS DUE TO TYPE 2 DIABETES MELLITUS (MULTI): Status: ACTIVE | Noted: 2024-03-06

## 2025-01-08 PROBLEM — G62.9 NEUROPATHY: Status: ACTIVE | Noted: 2021-04-01

## 2025-01-08 PROBLEM — M25.552 LEFT HIP PAIN: Status: ACTIVE | Noted: 2022-05-25

## 2025-01-08 PROBLEM — R93.7 ABNORMAL FINDINGS ON DIAGNOSTIC IMAGING OF OTHER PARTS OF MUSCULOSKELETAL SYSTEM: Status: ACTIVE | Noted: 2022-07-20

## 2025-01-08 PROBLEM — R26.89 OTHER ABNORMALITIES OF GAIT AND MOBILITY: Status: ACTIVE | Noted: 2024-03-19

## 2025-01-08 PROBLEM — Z98.890 STATUS POST RIGHT FOOT SURGERY: Status: ACTIVE | Noted: 2024-03-16

## 2025-01-08 PROBLEM — D50.9 IDA (IRON DEFICIENCY ANEMIA): Status: ACTIVE | Noted: 2024-03-06

## 2025-01-08 PROBLEM — S91.301D: Status: ACTIVE | Noted: 2024-03-16

## 2025-01-08 PROBLEM — E11.51 PERIPHERAL VASCULAR DISORDER DUE TO DIABETES MELLITUS (MULTI): Status: ACTIVE | Noted: 2025-01-08

## 2025-01-08 PROBLEM — E11.42 DIABETIC POLYNEUROPATHY ASSOCIATED WITH TYPE 2 DIABETES MELLITUS (MULTI): Status: ACTIVE | Noted: 2024-03-07

## 2025-01-08 PROBLEM — G89.18 POSTOPERATIVE PAIN: Status: ACTIVE | Noted: 2024-05-31

## 2025-01-08 PROBLEM — E11.69 OSTEOMYELITIS DUE TO TYPE 2 DIABETES MELLITUS (MULTI): Status: ACTIVE | Noted: 2024-03-06

## 2025-01-08 PROBLEM — I73.9 PAD (PERIPHERAL ARTERY DISEASE) (CMS-HCC): Status: ACTIVE | Noted: 2024-03-18

## 2025-01-08 PROBLEM — I71.40 ABDOMINAL AORTIC ANEURYSM, WITHOUT RUPTURE, UNSPECIFIED (CMS-HCC): Status: ACTIVE | Noted: 2022-10-01

## 2025-01-08 PROBLEM — M79.672 BILATERAL FOOT PAIN: Status: ACTIVE | Noted: 2024-01-05

## 2025-01-08 PROBLEM — E11.9 DIET-CONTROLLED DIABETES MELLITUS (MULTI): Status: ACTIVE | Noted: 2021-11-10

## 2025-01-08 PROBLEM — M14.60 CHARCOT'S ARTHROPATHY: Status: ACTIVE | Noted: 2024-08-07

## 2025-01-08 PROBLEM — E43 UNSPECIFIED SEVERE PROTEIN-CALORIE MALNUTRITION (MULTI): Status: ACTIVE | Noted: 2024-03-06

## 2025-01-08 PROBLEM — Z97.0 PRESENCE OF ARTIFICIAL EYE: Status: ACTIVE | Noted: 2022-04-14

## 2025-01-08 PROBLEM — M14.679 CHARCOT ARTHROPATHY OF MIDFOOT: Status: ACTIVE | Noted: 2024-05-24

## 2025-01-08 PROBLEM — E11.610: Status: ACTIVE | Noted: 2024-03-06

## 2025-01-08 PROBLEM — E08.621 DIABETIC ULCER OF FOOT ASSOCIATED WITH DIABETES MELLITUS DUE TO UNDERLYING CONDITION, LIMITED TO BREAKDOWN OF SKIN: Status: ACTIVE | Noted: 2022-04-11

## 2025-01-08 PROBLEM — I80.8 SUPERFICIAL THROMBOPHLEBITIS OF RIGHT UPPER EXTREMITY: Status: ACTIVE | Noted: 2024-06-10

## 2025-01-08 PROBLEM — I89.0 LYMPHEDEMA: Status: ACTIVE | Noted: 2020-11-30

## 2025-01-08 PROBLEM — B94.8 SEQUELA OF CORYNEBACTERIUM INFECTION: Status: ACTIVE | Noted: 2024-07-11

## 2025-01-08 PROBLEM — N52.9 ERECTILE DYSFUNCTION: Status: ACTIVE | Noted: 2022-07-20

## 2025-01-08 PROBLEM — G62.9 POLYNEUROPATHY, UNSPECIFIED: Status: ACTIVE | Noted: 2021-04-01

## 2025-01-08 PROBLEM — M25.562 CHRONIC PAIN OF LEFT KNEE: Status: ACTIVE | Noted: 2018-12-10

## 2025-01-08 PROBLEM — G89.29 CHRONIC PAIN OF LEFT KNEE: Status: ACTIVE | Noted: 2018-12-10

## 2025-01-08 PROBLEM — S80.02XA CONTUSION OF LEFT KNEE: Status: ACTIVE | Noted: 2018-10-30

## 2025-01-08 PROBLEM — F32.5 MAJOR DEPRESSIVE DISORDER IN REMISSION (CMS-HCC): Status: ACTIVE | Noted: 2021-04-14

## 2025-01-08 PROBLEM — D64.9 ANEMIA, UNSPECIFIED: Status: ACTIVE | Noted: 2022-07-20

## 2025-01-08 PROBLEM — R26.81 UNSTEADINESS ON FEET: Status: ACTIVE | Noted: 2024-03-19

## 2025-01-08 PROBLEM — R26.2 DIFFICULTY IN WALKING, NOT ELSEWHERE CLASSIFIED: Status: ACTIVE | Noted: 2022-04-15

## 2025-01-08 PROBLEM — L97.501 DIABETIC ULCER OF FOOT ASSOCIATED WITH DIABETES MELLITUS DUE TO UNDERLYING CONDITION, LIMITED TO BREAKDOWN OF SKIN: Status: ACTIVE | Noted: 2022-04-11

## 2025-01-08 PROBLEM — M86.672: Status: ACTIVE | Noted: 2024-02-15

## 2025-01-09 ASSESSMENT — ENCOUNTER SYMPTOMS
MUSCULOSKELETAL NEGATIVE: 1
EYES NEGATIVE: 1
CARDIOVASCULAR NEGATIVE: 1
HEMATOLOGIC/LYMPHATIC NEGATIVE: 1
CONSTITUTIONAL NEGATIVE: 1
PSYCHIATRIC NEGATIVE: 1
ENDOCRINE NEGATIVE: 1
NEUROLOGICAL NEGATIVE: 1
GASTROINTESTINAL NEGATIVE: 1
RESPIRATORY NEGATIVE: 1

## 2025-01-10 ENCOUNTER — OFFICE VISIT (OUTPATIENT)
Dept: VASCULAR SURGERY | Facility: HOSPITAL | Age: 75
End: 2025-01-10
Payer: MEDICARE

## 2025-01-10 VITALS
HEART RATE: 69 BPM | WEIGHT: 190 LBS | DIASTOLIC BLOOD PRESSURE: 79 MMHG | BODY MASS INDEX: 23.62 KG/M2 | OXYGEN SATURATION: 100 % | HEIGHT: 75 IN | SYSTOLIC BLOOD PRESSURE: 142 MMHG

## 2025-01-10 DIAGNOSIS — S88.111A BELOW-KNEE AMPUTATION OF RIGHT LOWER EXTREMITY, INITIAL ENCOUNTER (MULTI): Primary | ICD-10-CM

## 2025-01-10 PROCEDURE — 3078F DIAST BP <80 MM HG: CPT | Performed by: STUDENT IN AN ORGANIZED HEALTH CARE EDUCATION/TRAINING PROGRAM

## 2025-01-10 PROCEDURE — 3074F SYST BP LT 130 MM HG: CPT | Performed by: STUDENT IN AN ORGANIZED HEALTH CARE EDUCATION/TRAINING PROGRAM

## 2025-01-10 PROCEDURE — 99211 OFF/OP EST MAY X REQ PHY/QHP: CPT | Performed by: STUDENT IN AN ORGANIZED HEALTH CARE EDUCATION/TRAINING PROGRAM

## 2025-01-10 PROCEDURE — 1125F AMNT PAIN NOTED PAIN PRSNT: CPT | Performed by: STUDENT IN AN ORGANIZED HEALTH CARE EDUCATION/TRAINING PROGRAM

## 2025-01-10 PROCEDURE — 1159F MED LIST DOCD IN RCRD: CPT | Performed by: STUDENT IN AN ORGANIZED HEALTH CARE EDUCATION/TRAINING PROGRAM

## 2025-01-10 ASSESSMENT — PAIN SCALES - GENERAL: PAINLEVEL_OUTOF10: 10-WORST PAIN EVER

## 2025-01-13 NOTE — PROGRESS NOTES
Reason for Visit: Follow-up.  Referring Clinician: No ref. provider found     History of Present Illness  Dustin Ace is a 74 y.o. male with history of Charcot deformity for which he underwent a right BKA with TMR on 12/2/2024. He tolerated that procedure well and presents today for routine wound check. He is doing well.  His incision has now completely healed. He denies any significant pain. He takes occasional Tylenol for pain if needed. Denies any phantom pain. Denies fevers/chills,drainage from incision, or erythema of the incision.     Past Medical History  He has a past medical history of Arrhythmia, Cerebral vascular accident (Multi), Hypertension, Irregular heart beat, Peripheral vascular disease (CMS-HCC), Sleep apnea, and Type 2 diabetes mellitus.    Past Surgical History  He has no past surgical history on file.    Social History  He reports that he has never smoked. He has never used smokeless tobacco. He reports that he does not drink alcohol and does not use drugs.    Family History  No family history on file.    Allergies  Patient has no known allergies.    Outpatient Medications  Current Outpatient Medications   Medication Instructions    acetaminophen (TYLENOL) 975 mg, oral, Every 8 hours PRN    alum-mag hydroxide-simeth (Mylanta) 200-200-20 mg/5 mL oral suspension 30 mL, oral, Every 6 hours PRN    amLODIPine (NORVASC) 5 mg, Daily RT    apixaban (ELIQUIS) 5 mg, 2 times daily    atorvastatin (Lipitor) 80 mg tablet 1 tablet, Daily    B complex-vitamin C tablet 1 tablet, oral, Daily    benzocaine-menthol (Cepastat Sore Throat) lozenge 1 lozenge, Mouth/Throat, Every 2 hour PRN    blood sugar diagnostic strip Use with blood glucose test    cholecalciferol (VITAMIN D-3) 400 Units, Daily RT    cyanocobalamin (VITAMIN B-12) 1,000 mcg, Daily    diazePAM (VALIUM) 5 mg, oral, Daily PRN    gabapentin (NEURONTIN) 300 mg, oral, 3 times daily     "insulin lispro 0-5 Units, subcutaneous, 3 times daily before meals, Take as directed per insulin instructions.    methocarbamol (ROBAXIN) 500 mg, oral, Nightly PRN    OneTouch Delica Plus Lancet 33 gauge misc     OneTouch Ultra2 Meter misc     polyethylene glycol (GLYCOLAX, MIRALAX) 17 g, oral, Daily    silver-hydrocolloid dressing (Aquacel-AG Advantage) 1.2-4 X 5 %-\" bandage Moisten a postage size piece with saline and apply on the two wounds on the stump daily.    sodium chloride 0.9% (Normal Saline Flush) flush 10 mL, intravenous, Daily    tamsulosin (FLOMAX) 0.4 mg, Daily       Review of Systems  Review of Systems   Constitutional: Negative.   HENT: Negative.     Eyes: Negative.    Cardiovascular: Negative.    Respiratory: Negative.     Endocrine: Negative.    Hematologic/Lymphatic: Negative.    Skin: Negative.    Musculoskeletal: Negative.    Gastrointestinal: Negative.    Genitourinary: Negative.    Neurological: Negative.    Psychiatric/Behavioral: Negative.         Last Recorded Vitals  Vitals:    01/10/25 1549 01/10/25 1551   BP: 127/74 142/79   BP Location: Right arm Left arm   Patient Position: Sitting Sitting   Pulse: 69    SpO2: 100%    Weight: 86.2 kg (190 lb)    Height: 1.905 m (6' 3\")        Physical Examination  General: Well appearing, well-nourished, in no acute distress.  HEENT: Normocephalic atraumatic, pupils equal and reactive to light, extraocular muscles intact, no conjunctival injection, oropharynx clear without exudates.  Neck: Normal carotid arterial pulses, no arterial bruits, no thyromegaly.  Cardiac: Regular rhythm and normal heart rate.  Pulmonary: No increased work of breathing, no wheezes or crackles.  GI: Soft, ND, NT  Lower extremities: Well-healed right BKA without any residual dehiscence or wound.  Skin: Skin intact. No significant rashes or lesions present.  Neuro: Alert and oriented x 3, normal attention and cognition, no focal motor or sensory neurologic deficits.  Psych: " "Normal affect and mood.  Musculoskeletal: Normal gait normal muscle tone.    Laboratory Studies  Lab Results   Component Value Date    GLUCOSE 102 (H) 12/05/2024    CALCIUM 8.7 12/05/2024     12/05/2024    K 4.0 12/05/2024    CO2 25 12/05/2024     12/05/2024    BUN 15 12/05/2024    CREATININE 0.56 12/05/2024     Lab Results   Component Value Date    ALT 19 12/02/2024    AST 12 12/02/2024    ALKPHOS 54 12/02/2024    BILITOT 1.2 12/02/2024         Lab Results   Component Value Date    CHOL 62 11/26/2024     Lab Results   Component Value Date    HDL 13.9 11/26/2024     Lab Results   Component Value Date    LDLCALC 34 11/26/2024     Lab Results   Component Value Date    TRIG 69 11/26/2024     No components found for: \"CHOLHDL\"  Lab Results   Component Value Date    HGBA1C 4.9 01/03/2025     No components found for: \"UACR\"    Assessment and Plan  Problem List Items Addressed This Visit    None      -Patient is status post right BKA with TMR for Charcot deformity.  He is doing well.  His incision is completely healed.  Okay to start the process of prosthetic fitting in the next couple of weeks.  Will have patient return to clinic in 6 months for routine check.    Sis Gates MD      "

## 2025-01-14 ASSESSMENT — ENCOUNTER SYMPTOMS
EYES NEGATIVE: 1
CONSTITUTIONAL NEGATIVE: 1
CARDIOVASCULAR NEGATIVE: 1
HEMATOLOGIC/LYMPHATIC NEGATIVE: 1
ENDOCRINE NEGATIVE: 1
PSYCHIATRIC NEGATIVE: 1
NEUROLOGICAL NEGATIVE: 1
GASTROINTESTINAL NEGATIVE: 1
MUSCULOSKELETAL NEGATIVE: 1
RESPIRATORY NEGATIVE: 1

## 2025-04-22 ENCOUNTER — HOSPITAL ENCOUNTER (EMERGENCY)
Facility: HOSPITAL | Age: 75
Discharge: HOME | End: 2025-04-22
Payer: MEDICARE

## 2025-04-22 VITALS
WEIGHT: 190 LBS | TEMPERATURE: 97.5 F | RESPIRATION RATE: 15 BRPM | BODY MASS INDEX: 23.75 KG/M2 | SYSTOLIC BLOOD PRESSURE: 127 MMHG | HEART RATE: 71 BPM | OXYGEN SATURATION: 99 % | DIASTOLIC BLOOD PRESSURE: 70 MMHG

## 2025-04-22 DIAGNOSIS — M54.40 BACK PAIN OF LUMBAR REGION WITH SCIATICA: Primary | ICD-10-CM

## 2025-04-22 PROCEDURE — 99284 EMERGENCY DEPT VISIT MOD MDM: CPT

## 2025-04-22 PROCEDURE — 2500000004 HC RX 250 GENERAL PHARMACY W/ HCPCS (ALT 636 FOR OP/ED): Mod: JZ

## 2025-04-22 PROCEDURE — 2500000005 HC RX 250 GENERAL PHARMACY W/O HCPCS

## 2025-04-22 PROCEDURE — 96372 THER/PROPH/DIAG INJ SC/IM: CPT

## 2025-04-22 RX ORDER — PREDNISONE 50 MG/1
50 TABLET ORAL DAILY
Qty: 5 TABLET | Refills: 0 | Status: SHIPPED | OUTPATIENT
Start: 2025-04-22 | End: 2025-04-27

## 2025-04-22 RX ORDER — LIDOCAINE 560 MG/1
1 PATCH PERCUTANEOUS; TOPICAL; TRANSDERMAL ONCE
Status: DISCONTINUED | OUTPATIENT
Start: 2025-04-22 | End: 2025-04-22 | Stop reason: HOSPADM

## 2025-04-22 RX ORDER — TIZANIDINE HYDROCHLORIDE 2 MG/1
2 CAPSULE, GELATIN COATED ORAL DAILY PRN
Qty: 10 CAPSULE | Refills: 0 | Status: SHIPPED | OUTPATIENT
Start: 2025-04-22

## 2025-04-22 RX ORDER — KETOROLAC TROMETHAMINE 15 MG/ML
15 INJECTION, SOLUTION INTRAMUSCULAR; INTRAVENOUS ONCE
Status: COMPLETED | OUTPATIENT
Start: 2025-04-22 | End: 2025-04-22

## 2025-04-22 RX ADMIN — KETOROLAC TROMETHAMINE 15 MG: 15 INJECTION, SOLUTION INTRAMUSCULAR; INTRAVENOUS at 15:24

## 2025-04-22 RX ADMIN — LIDOCAINE PAIN RELIEF 1 PATCH: 560 PATCH TOPICAL at 15:25

## 2025-04-22 ASSESSMENT — PAIN SCALES - GENERAL: PAINLEVEL_OUTOF10: 7

## 2025-04-22 ASSESSMENT — LIFESTYLE VARIABLES
TOTAL SCORE: 0
EVER FELT BAD OR GUILTY ABOUT YOUR DRINKING: NO
EVER HAD A DRINK FIRST THING IN THE MORNING TO STEADY YOUR NERVES TO GET RID OF A HANGOVER: NO
HAVE YOU EVER FELT YOU SHOULD CUT DOWN ON YOUR DRINKING: NO
HAVE PEOPLE ANNOYED YOU BY CRITICIZING YOUR DRINKING: NO

## 2025-04-22 ASSESSMENT — PAIN DESCRIPTION - LOCATION: LOCATION: BACK

## 2025-04-22 ASSESSMENT — PAIN DESCRIPTION - DESCRIPTORS: DESCRIPTORS: DISCOMFORT

## 2025-04-22 ASSESSMENT — PAIN DESCRIPTION - PAIN TYPE: TYPE: ACUTE PAIN

## 2025-04-22 ASSESSMENT — PAIN - FUNCTIONAL ASSESSMENT: PAIN_FUNCTIONAL_ASSESSMENT: 0-10

## 2025-04-22 NOTE — DISCHARGE INSTRUCTIONS
Take prednisone daily until finished.  You can discontinue the Medrol Dosepak.  The prednisone will help with inflammation and irritation of the nerve in your back if you can, I recommend taking breaks from sitting in your wheelchair as it is probably irritating.  The muscles in your buttock.  Can take Tylenol as needed for pain as previously prescribed  Can take Zanaflex daily if needed for pain    If pain persist, follow-up with primary care  Return to the emergency department if you develop new symptoms or worsening symptoms

## 2025-04-22 NOTE — ED TRIAGE NOTES
This patient was seen in triage.     Vitals are noted.      HPI:  Patient is a 74-year-old male with history of right BKA presenting with right lower back pain that radiates down through the right butt, worse with movement and standing up straight, has been given a Medrol Dosepak, Tylenol, tizanidine with minimal relief of symptoms, denies any fevers, chills, traumatic injury, denies any wounds.    Focused PE:  Gen: Well-appearing, not in acute distress  Cardiovascular: Regular rate, normal rhythm, no murmur, no gallop  Respiratory: No adventitious lung sounds auscultated.  Abdomen: No reproducible abdominal tenderness upon palpation,  physical exam may be limited by patient positioning sitting up in a chair  Neuro:  Alert and Oriented, speech clear and coherent     Plan:     Analgesics     For the remainder of the patient's workup and ED course, please see the main ED provider note.  We discussed need for diagnostic testing including lab studies and imaging.  We also discussed that they may be asked to wait in the waiting room while these test are pending.  They understand that if they choose to leave without having the testing completed or resulted that we cannot rule out acute life-threatening illnesses and the risks involved to lead to worsening condition, permanent disability or even death.

## 2025-04-22 NOTE — ED PROVIDER NOTES
HPI   Chief Complaint   Patient presents with    Back Pain       Dustin Ace is a 74 year old male with PMHx of right chronic osteomyelitis s/p BKA 12/2/2024. He is presenting to the ED for lumbar back pain. Patient states that he got his prosthesis about 4 weeks and has been working on standing on it but is wheelchair bound for the majority of the day. Patient states that the pain started about 2 weeks ago and has progressively worsened.  Describes the pain in his right lumbar back with radiculopathy down his right glute into the proximal half of his posterior thigh.  It is a sharp pain that is aggravated by standing up or extending his left leg.  Also worsens with prolonged sitting in his wheelchair.  He has been unable to sleep the last two nights due to pain. He has tried putting pillows under his right bottom with minimal relief. He denies fevers, chills, saddle anesthesia, loss of bowel or bladder function, or trauma to the area, numbness or weakness in his lower extremities.    Went to a clinic a few days ago and they prescribed tizanidine, Tylenol and a Dosepak.  Only took approximately 3 pills of the Medrol Dosepak when he googled it and noticed that it said it was meant for asthma so he discontinued it as he was unsure if he truly needed it for his back pain.  States that the tizanidine does mildly help with his back pain.              Patient History   Medical History[1]  Surgical History[2]  Family History[3]  Social History[4]    Physical Exam   ED Triage Vitals [04/22/25 1425]   Temperature Heart Rate Respirations BP   36.4 °C (97.5 °F) 56 18 106/63      Pulse Ox Temp src Heart Rate Source Patient Position   98 % -- Monitor Sitting      BP Location FiO2 (%)     Left arm --       Physical Exam  Constitutional:       General: He is not in acute distress.     Appearance: Normal appearance. He is not ill-appearing or toxic-appearing.   HENT:      Head: Normocephalic and atraumatic.   Pulmonary:       Effort: Pulmonary effort is normal.   Musculoskeletal:         General: Normal range of motion.      Comments: Pain to palpation of right SI joint and right glute.  Positive right straight leg raise test.  Sensation and strength intact in lower extremities bilaterally 5/5   Skin:     General: Skin is warm and dry.   Neurological:      General: No focal deficit present.      Mental Status: He is alert and oriented to person, place, and time.   Psychiatric:         Mood and Affect: Mood normal.         Behavior: Behavior normal.           ED Course & MDM   Diagnoses as of 04/22/25 1514   Back pain of lumbar region with sciatica                 No data recorded                                 Medical Decision Making  74-year-old male presenting today for back pain.  Pain to palpation of right SI joint and right glute.  Positive right straight leg raise test.  Sensation and strength intact in lower extremities bilaterally 5/5.  No red flag symptoms, will defer imaging at this time.  Only took 3 doses of the Medrol Dosepak but googled it and thought it was for asthma so we discontinued it.  Discussed that steroids could be prescribed for back pain and discussed why this could be a good option considering he is on Eliquis and typically attempt to avoid NSAIDs for relief.  Discussed daily prednisone for 5 days and he is willing to trial it as it is easier than the Medrol Dosepak packet.  Discussed that he continue using the Tylenol and tizanidine as needed.  Will also send in lidocaine patches.  Discussed he should follow-up with PCP if symptoms persist and discussed return precautions.        Procedure  Procedures       [1]   Past Medical History:  Diagnosis Date    Arrhythmia     Cerebral vascular accident (Multi)     Hypertension     Irregular heart beat     Peripheral vascular disease (CMS-HCC)     Sleep apnea     Type 2 diabetes mellitus    [2] History reviewed. No pertinent surgical history.  [3] No family history on  file.  [4]   Social History  Tobacco Use    Smoking status: Never    Smokeless tobacco: Never   Vaping Use    Vaping status: Never Used   Substance Use Topics    Alcohol use: Never    Drug use: Never        Latisha Cardoza PA-C  04/22/25 1527

## (undated) DEVICE — BOLSTER, HIP

## (undated) DEVICE — APPLICATOR, CHLORAPREP, W/ORANGE TINT, 26ML

## (undated) DEVICE — STAPLER, SKIN PROXIMATE, 35 WIDE

## (undated) DEVICE — DRAPE, INCISE, ANTIMICROBIAL, IOBAN 2, LARGE, 17 X 23 IN, DISPOSABLE, STERILE

## (undated) DEVICE — DRESSING, ABDOMINAL, WET PRUF, TENDERSORB, 5 X 9 IN, STERILE

## (undated) DEVICE — SUTURE, ETHILON, 2-0, FSLX 30, BLACK

## (undated) DEVICE — DRAPE, SHEET, U, W/ADHESIVE STRIP, IMPERVIOUS, 60 X 70 IN, DISPOSABLE, LF, STERILE

## (undated) DEVICE — PROTECTOR, NERVE, ULNAR, PINK

## (undated) DEVICE — DRAPE, PAD, PREP, W/ 9 IN CUFF, 24 X 41, LF, NS

## (undated) DEVICE — SUTURE, SILK, 2-0, 18 IN, BLACK

## (undated) DEVICE — SUTURE, SILK, 3-0, 30 IN, BR SH, BLACK

## (undated) DEVICE — BLADE, SAW, SAGITTAL, 18.5 X 73 X 0.76 MM, STAINLESS STEEL, STERILE

## (undated) DEVICE — GLOVE, SURGICAL, PROTEXIS PI BLUE W/NEUTHERA, 6.0, PF, LF

## (undated) DEVICE — COVER, CART, 45 X 27 X 48 IN, CLEAR

## (undated) DEVICE — BANDAGE, ELASTIC, SELF-CLOSE, 4 IN, HONEYCOMB, STERILE

## (undated) DEVICE — SPEAR, EYE, SURGICAL, WECK-CEL, CELLULOSE

## (undated) DEVICE — SUTURE, PERMA HAND 2-0, TAPER POINT, SH BLACK 8-18 INCH

## (undated) DEVICE — SUTURE, VICRYL, 2-0, 18 IN, CT-1, UNDYED

## (undated) DEVICE — DRAPE, SHEET, EXTREMITY, W/ARM BOARD COVERS, 87 X 106 X 128 IN, DISPOSABLE, LF, STERILE

## (undated) DEVICE — DRAPE, SHEET, FAN FOLDED, HALF, 44 X 58 IN, DISPOSABLE, LF, STERILE

## (undated) DEVICE — Device

## (undated) DEVICE — COUNTER, NEEDLE, FOAM BLOCK, POP-N-COUNT, W/BLADEGUARD, W/ADHESIVE 40 COUNT, RED

## (undated) DEVICE — IMMOBILIZER, KNEE, POST OP, SUPER LITE, W/STRAIGHT STAYS, MEDIUM, 20 IN

## (undated) DEVICE — SPONGE, HEMOSTATIC, CELLULOSE, SURGICEL, FIBRILLAR, 2 X 4 IN

## (undated) DEVICE — DRESSING, ISLAND, ADHESIVE, TELFA, 4 X 8 IN

## (undated) DEVICE — MANIFOLD, 4 PORT NEPTUNE STANDARD

## (undated) DEVICE — DRESSING, ADHESIVE, ISLAND, TELFA, 4 X 10 IN

## (undated) DEVICE — GLOVE, SURGICAL, PROTEXIS PI MICRO, 6.0, PF, LF

## (undated) DEVICE — DRESSING, MEPILEX BORDER, POST-OP AG, 4 X 10 IN

## (undated) DEVICE — BANDAGE, GAUZE, CONFORMING, KERLIX, 6 PLY, 4.5 IN X 4.1 YD

## (undated) DEVICE — DEVICE, STIMULATOR, NERVE LOCATOR, ST

## (undated) DEVICE — PREP TRAY, SKIN, DRY, W/GLOVES

## (undated) DEVICE — BANDAGE, COFLEX, 4 X 5 YDS, TAN, STERILE, LF

## (undated) DEVICE — SUTURE, VICRYL PLUS 3-0, SH, 27IN

## (undated) DEVICE — SUTURE, SILK, 3-0, 18 IN SH/CR, BLACK